# Patient Record
Sex: FEMALE | Race: WHITE | NOT HISPANIC OR LATINO | Employment: OTHER | URBAN - METROPOLITAN AREA
[De-identification: names, ages, dates, MRNs, and addresses within clinical notes are randomized per-mention and may not be internally consistent; named-entity substitution may affect disease eponyms.]

---

## 2017-05-01 ENCOUNTER — HISTORICAL (OUTPATIENT)
Dept: LAB | Facility: HOSPITAL | Age: 42
End: 2017-05-01

## 2017-05-01 LAB
ABS NEUT (OLG): 3.5 X10(3)/MCL (ref 1.5–6.9)
ALBUMIN SERPL-MCNC: 3.9 GM/DL (ref 3.4–5)
ALBUMIN/GLOB SERPL: 1.1 RATIO
ALP SERPL-CCNC: 68 UNIT/L (ref 30–113)
ALT SERPL-CCNC: 47 UNIT/L (ref 10–45)
AST SERPL-CCNC: 21 UNIT/L (ref 15–37)
BASOPHILS # BLD AUTO: 0 X10(3)/MCL (ref 0–0.1)
BASOPHILS NFR BLD AUTO: 0 % (ref 0–1)
BILIRUB SERPL-MCNC: 0.4 MG/DL (ref 0.1–0.9)
BILIRUBIN DIRECT+TOT PNL SERPL-MCNC: 0.1 MG/DL (ref 0–0.3)
BILIRUBIN DIRECT+TOT PNL SERPL-MCNC: 0.3 MG/DL
BUN SERPL-MCNC: 18 MG/DL (ref 10–20)
CALCIUM SERPL-MCNC: 9 MG/DL (ref 8–10.5)
CHLORIDE SERPL-SCNC: 103 MMOL/L (ref 100–108)
CHOLEST SERPL-MCNC: 195 MG/DL (ref 140–200)
CHOLEST/HDLC SERPL: 4 MG/DL (ref 35–59)
CO2 SERPL-SCNC: 30 MMOL/L (ref 21–35)
CREAT SERPL-MCNC: 0.72 MG/DL (ref 0.7–1.3)
EOSINOPHIL # BLD AUTO: 0.2 X10(3)/MCL (ref 0–0.6)
EOSINOPHIL NFR BLD AUTO: 3 % (ref 0–5)
ERYTHROCYTE [DISTWIDTH] IN BLOOD BY AUTOMATED COUNT: 13 % (ref 11.5–17)
GLOBULIN SER-MCNC: 3.5 GM/DL
GLUCOSE SERPL-MCNC: 101 MG/DL (ref 75–116)
HCT VFR BLD AUTO: 37.3 % (ref 36–48)
HDLC SERPL-MCNC: 48 MG/DL (ref 35–59)
HGB BLD-MCNC: 12.1 GM/DL (ref 12–16)
LDLC SERPL CALC-MCNC: 132 MG/DL (ref 100–129)
LYMPHOCYTES # BLD AUTO: 2.4 X10(3)/MCL (ref 0.5–4.1)
LYMPHOCYTES NFR BLD AUTO: 36.1 % (ref 15–40)
MCH RBC QN AUTO: 30 PG (ref 27–34)
MCHC RBC AUTO-ENTMCNC: 32 GM/DL (ref 31–36)
MCV RBC AUTO: 93 FL (ref 80–99)
MONOCYTES # BLD AUTO: 0.4 X10(3)/MCL (ref 0–1.1)
MONOCYTES NFR BLD AUTO: 6 % (ref 4–12)
NEUTROPHILS # BLD AUTO: 3.5 X10(3)/MCL (ref 1.5–6.9)
NEUTROPHILS NFR BLD AUTO: 54 % (ref 43–75)
PLATELET # BLD AUTO: 218 X10(3)/MCL (ref 140–400)
PMV BLD AUTO: 10.7 FL (ref 6.8–10)
POTASSIUM SERPL-SCNC: 3.9 MMOL/L (ref 3.6–5.2)
PROT SERPL-MCNC: 7.4 GM/DL (ref 6.4–8.2)
RBC # BLD AUTO: 4.03 X10(6)/MCL (ref 4.2–5.4)
SODIUM SERPL-SCNC: 141 MMOL/L (ref 135–145)
TRIGL SERPL-MCNC: 129 MG/DL (ref 35–150)
TSH SERPL-ACNC: 3.65 MIU/ML (ref 0.36–3.74)
VLDLC SERPL CALC-MCNC: 26 MG/DL
WBC # SPEC AUTO: 6.5 X10(3)/MCL (ref 4.5–11.5)

## 2017-05-11 ENCOUNTER — HISTORICAL (OUTPATIENT)
Dept: RADIOLOGY | Facility: HOSPITAL | Age: 42
End: 2017-05-11

## 2017-06-01 ENCOUNTER — HISTORICAL (OUTPATIENT)
Dept: RADIOLOGY | Facility: HOSPITAL | Age: 42
End: 2017-06-01

## 2018-06-05 ENCOUNTER — HISTORICAL (OUTPATIENT)
Dept: LAB | Facility: HOSPITAL | Age: 43
End: 2018-06-05

## 2019-01-09 ENCOUNTER — HISTORICAL (OUTPATIENT)
Dept: LAB | Facility: HOSPITAL | Age: 44
End: 2019-01-09

## 2021-07-27 ENCOUNTER — HISTORICAL (OUTPATIENT)
Dept: ADMINISTRATIVE | Facility: HOSPITAL | Age: 46
End: 2021-07-27

## 2021-07-30 ENCOUNTER — HISTORICAL (OUTPATIENT)
Dept: RADIOLOGY | Facility: HOSPITAL | Age: 46
End: 2021-07-30

## 2021-11-17 ENCOUNTER — HISTORICAL (OUTPATIENT)
Dept: LAB | Facility: HOSPITAL | Age: 46
End: 2021-11-17

## 2021-11-19 LAB
BUN SERPL-MCNC: 13 MG/DL (ref 7–18.7)
CALCIUM SERPL-MCNC: 9.6 MG/DL (ref 8.7–10.5)
CHLORIDE SERPL-SCNC: 100 MMOL/L (ref 98–107)
CO2 SERPL-SCNC: 35 MMOL/L (ref 22–29)
CREAT SERPL-MCNC: 0.76 MG/DL (ref 0.55–1.02)
CREAT/UREA NIT SERPL: 17
GLUCOSE SERPL-MCNC: 100 MG/DL (ref 74–100)
POTASSIUM SERPL-SCNC: 4.1 MMOL/L (ref 3.5–5.1)
SODIUM SERPL-SCNC: 142 MMOL/L (ref 136–145)

## 2021-12-06 ENCOUNTER — HISTORICAL (OUTPATIENT)
Dept: RADIOLOGY | Facility: HOSPITAL | Age: 46
End: 2021-12-06

## 2022-01-11 ENCOUNTER — HISTORICAL (OUTPATIENT)
Dept: LAB | Facility: HOSPITAL | Age: 47
End: 2022-01-11

## 2022-03-30 ENCOUNTER — HISTORICAL (OUTPATIENT)
Dept: ADMINISTRATIVE | Facility: HOSPITAL | Age: 47
End: 2022-03-30

## 2022-03-30 ENCOUNTER — HISTORICAL (OUTPATIENT)
Dept: RADIOLOGY | Facility: HOSPITAL | Age: 47
End: 2022-03-30

## 2022-03-30 LAB
ALBUMIN SERPL-MCNC: 3.6 G/DL (ref 3.5–5)
ALBUMIN/GLOB SERPL: 1.1 {RATIO} (ref 1.1–2)
ALP SERPL-CCNC: 67 U/L (ref 40–150)
ALT SERPL-CCNC: 30 U/L (ref 0–55)
AST SERPL-CCNC: 22 U/L (ref 5–34)
BILIRUB SERPL-MCNC: 0.4 MG/DL
BILIRUBIN DIRECT+TOT PNL SERPL-MCNC: 0.2 (ref 0–0.5)
BILIRUBIN DIRECT+TOT PNL SERPL-MCNC: 0.2 (ref 0–0.8)
BUN SERPL-MCNC: 15 MG/DL (ref 7–18.7)
CALCIUM SERPL-MCNC: 9.7 MG/DL (ref 8.7–10.5)
CHLORIDE SERPL-SCNC: 102 MMOL/L (ref 98–107)
CHOLEST SERPL-MCNC: 184 MG/DL
CHOLEST/HDLC SERPL: 3 {RATIO} (ref 0–5)
CO2 SERPL-SCNC: 33 MMOL/L (ref 22–29)
CREAT SERPL-MCNC: 0.66 MG/DL (ref 0.55–1.02)
GLOBULIN SER-MCNC: 3.4 G/DL (ref 2.4–3.5)
GLUCOSE SERPL-MCNC: 112 MG/DL (ref 74–100)
HDLC SERPL-MCNC: 55 MG/DL (ref 35–60)
HEMOLYSIS INTERF INDEX SERPL-ACNC: 6
ICTERIC INTERF INDEX SERPL-ACNC: 0
LDLC SERPL CALC-MCNC: 103 MG/DL (ref 50–140)
LIPEMIC INTERF INDEX SERPL-ACNC: 3
POTASSIUM SERPL-SCNC: 4.1 MMOL/L (ref 3.5–5.1)
PROT SERPL-MCNC: 7 G/DL (ref 6.4–8.3)
SODIUM SERPL-SCNC: 139 MMOL/L (ref 136–145)
TRIGL SERPL-MCNC: 131 MG/DL (ref 37–140)
TSH SERPL-ACNC: 4.81 M[IU]/L (ref 0.35–4.94)
VLDLC SERPL CALC-MCNC: 26 MG/DL

## 2022-04-05 ENCOUNTER — HISTORICAL (OUTPATIENT)
Dept: RADIOLOGY | Facility: HOSPITAL | Age: 47
End: 2022-04-05

## 2022-04-05 ENCOUNTER — HISTORICAL (OUTPATIENT)
Dept: ADMINISTRATIVE | Facility: HOSPITAL | Age: 47
End: 2022-04-05

## 2022-04-06 ENCOUNTER — HISTORICAL (OUTPATIENT)
Dept: ADMINISTRATIVE | Facility: HOSPITAL | Age: 47
End: 2022-04-06

## 2022-04-13 ENCOUNTER — HISTORICAL (OUTPATIENT)
Dept: ADMINISTRATIVE | Facility: HOSPITAL | Age: 47
End: 2022-04-13

## 2022-04-13 ENCOUNTER — HISTORICAL (OUTPATIENT)
Dept: RADIOLOGY | Facility: HOSPITAL | Age: 47
End: 2022-04-13
Payer: MEDICAID

## 2022-04-14 ENCOUNTER — HISTORICAL (OUTPATIENT)
Dept: ADMINISTRATIVE | Facility: HOSPITAL | Age: 47
End: 2022-04-14

## 2022-04-27 ENCOUNTER — HISTORICAL (OUTPATIENT)
Dept: LAB | Facility: HOSPITAL | Age: 47
End: 2022-04-27
Payer: MEDICAID

## 2022-04-27 LAB — TSH SERPL-ACNC: 3.01 M[IU]/L (ref 0.35–4.94)

## 2022-05-19 ENCOUNTER — HOSPITAL ENCOUNTER (EMERGENCY)
Facility: HOSPITAL | Age: 47
Discharge: HOME | End: 2022-05-19
Attending: STUDENT IN AN ORGANIZED HEALTH CARE EDUCATION/TRAINING PROGRAM
Payer: MEDICAID

## 2022-05-19 VITALS
BODY MASS INDEX: 50.02 KG/M2 | WEIGHT: 293 LBS | RESPIRATION RATE: 20 BRPM | DIASTOLIC BLOOD PRESSURE: 84 MMHG | OXYGEN SATURATION: 94 % | HEIGHT: 64 IN | TEMPERATURE: 99 F | SYSTOLIC BLOOD PRESSURE: 152 MMHG | HEART RATE: 96 BPM

## 2022-05-19 DIAGNOSIS — K04.7 DENTAL ABSCESS: Primary | ICD-10-CM

## 2022-05-19 PROCEDURE — 96372 THER/PROPH/DIAG INJ SC/IM: CPT | Performed by: NURSE PRACTITIONER

## 2022-05-19 PROCEDURE — 63600175 PHARM REV CODE 636 W HCPCS: Performed by: NURSE PRACTITIONER

## 2022-05-19 PROCEDURE — 99284 EMERGENCY DEPT VISIT MOD MDM: CPT

## 2022-05-19 RX ORDER — KETOROLAC TROMETHAMINE 30 MG/ML
30 INJECTION, SOLUTION INTRAMUSCULAR; INTRAVENOUS ONCE
Status: COMPLETED | OUTPATIENT
Start: 2022-05-19 | End: 2022-05-19

## 2022-05-19 RX ORDER — PREDNISONE 20 MG/1
40 TABLET ORAL DAILY
Qty: 10 TABLET | Refills: 0 | Status: SHIPPED | OUTPATIENT
Start: 2022-05-19 | End: 2022-05-24

## 2022-05-19 RX ADMIN — KETOROLAC TROMETHAMINE 30 MG: 30 INJECTION, SOLUTION INTRAMUSCULAR; INTRAVENOUS at 09:05

## 2022-05-20 NOTE — ED PROVIDER NOTES
"     Source of History:  Patient     Chief complaint:  Dental Problem (Pt was seen by DR Alvarado for dental pain . Was given clinda and norco but its not helping. )      HPI:  Linda Lorenz is a 46 y.o. female presenting with right lower dental pain.  Patient states she saw her PCP yesterday who prescribed her antibiotics as well as pain medicine but is not helping.  Patient denies any fevers chills.  Patient states this started antibiotics swelling seen to the external skin surface over the jaw has increased.  Patient has no fevers or chills.  Patient has no respiratory distress.  She has no stridor.    This is the extent to the patients complaints today here in the emergency department.    ROS: As per HPI and below:  General: No fever.  No chills.  Eyes: No visual changes.  ENT: No sore throat. No ear pain.  Right lower dental pain.  Head: No headache.    Chest: No shortness of breath. No cough.  Cardiovascular: No chest pain.  Abdomen: No abdominal pain.  No nausea or vomiting.  Genito-Urinary: No abnormal urination.  Neurologic: No focal weakness.  No numbness.  MSK: No myalgias. No arthralgias.   Integument: No rashes or lesions.  Psych: No confusion      Review of patient's allergies indicates:   Allergen Reactions    Penicillins        PMH:  As per HPI and below:  History reviewed. No pertinent past medical history.  History reviewed. No pertinent surgical history.         Physical Exam:    /73 (BP Location: Left arm, Patient Position: Sitting)   Pulse 98   Temp 98.8 °F (37.1 °C) (Oral)   Resp 20   Ht 5' 4" (1.626 m)   Wt (!) 181.4 kg (400 lb)   SpO2 96%   BMI 68.66 kg/m²   Nursing note and vital signs reviewed.  Appearance: Afebrile. Not toxic appearing. No acute distress.  Head: Atraumatic  Eyes: No conjunctival injection. No scleral icterus  ENT: Normal phonation.  Tooth number 28 is painful with palpation with tongue depressor and a small 1/2 cm abscess is seen on the gums to the " right of tooth 29.  Fluctuance noted.  Poor dentition throughout.  Chest/ Respiratory: No respiratory distress. No accessory muscle use.  Cardiovascular: Regular rate   Abdomen:  Not distended.    Musculoskeletal: Good range of motion all joints.  No deformities.  Neck supple.  No meningismus.  Skin: No rashes seen.  Good turgor.  No ecchymoses.  Neurologic: GCS 15. Ambulates with a steady gait.   Mental Status:  Alert and oriented x 3.  Appropriate, conversant    Labs that have been ordered have been independently reviewed and interpreted by myself.        Initial Impression/ Differential Dx:  Dental abscess    MDM:    46 y.o. female with right lower dental pain presents emergency room for evaluation.  Patient was seen by PCP in order given appropriate medications for dental abscess and is apparently scheduled to see a dentist but is on a waiting list.  Will give anti-inflammatory injection of Toradol at this time and sent home with steroids.  I discussed with her draining dental abscess seen on exam however the patient refused.  She states that she would rather follow up with her dentist.  I did discuss attempting salt water gargles as well as hot compress which the patient understood.                   Diagnostic Impression:    1. Dental abscess         ED Disposition Condition    Discharge Stable          ED Prescriptions     Medication Sig Dispense Start Date End Date Auth. Provider    predniSONE (DELTASONE) 20 MG tablet Take 2 tablets (40 mg total) by mouth once daily. for 5 days 10 tablet 5/19/2022 5/24/2022 DERRELL Ragland        Follow-up Information     Follow up With Specialties Details Why Contact Info    Jenaro Alvarado MD Family Medicine Call in 1 week As needed [id number] [id number] [id number]  [phone]             DERRELL Ragland  05/19/22 2077

## 2022-06-22 ENCOUNTER — LAB VISIT (OUTPATIENT)
Dept: LAB | Facility: HOSPITAL | Age: 47
End: 2022-06-22
Attending: FAMILY MEDICINE
Payer: MEDICAID

## 2022-06-22 DIAGNOSIS — R60.0 LOCALIZED EDEMA: Primary | ICD-10-CM

## 2022-06-22 LAB
ANION GAP SERPL CALC-SCNC: 5 MEQ/L
BUN SERPL-MCNC: 16 MG/DL (ref 7–18.7)
CALCIUM SERPL-MCNC: 9.2 MG/DL (ref 8.4–10.2)
CHLORIDE SERPL-SCNC: 102 MMOL/L (ref 98–107)
CO2 SERPL-SCNC: 32 MMOL/L (ref 22–29)
CREAT SERPL-MCNC: 0.58 MG/DL (ref 0.55–1.02)
CREAT/UREA NIT SERPL: 28
GLUCOSE SERPL-MCNC: 109 MG/DL (ref 74–100)
POTASSIUM SERPL-SCNC: 4.2 MMOL/L (ref 3.5–5.1)
SODIUM SERPL-SCNC: 139 MMOL/L (ref 136–145)

## 2022-06-22 PROCEDURE — 36415 COLL VENOUS BLD VENIPUNCTURE: CPT

## 2022-06-22 PROCEDURE — 80048 BASIC METABOLIC PNL TOTAL CA: CPT

## 2022-08-18 ENCOUNTER — HOSPITAL ENCOUNTER (OUTPATIENT)
Dept: RADIOLOGY | Facility: HOSPITAL | Age: 47
Discharge: HOME | End: 2022-08-18
Attending: FAMILY MEDICINE
Payer: MEDICAID

## 2022-08-18 DIAGNOSIS — M54.6 PAIN IN THORACIC SPINE: ICD-10-CM

## 2022-08-18 PROCEDURE — 72070 X-RAY EXAM THORAC SPINE 2VWS: CPT | Mod: TC

## 2022-09-26 ENCOUNTER — HOSPITAL ENCOUNTER (INPATIENT)
Facility: HOSPITAL | Age: 47
LOS: 3 days | Discharge: HOME HEALTH | DRG: 188 | End: 2022-09-29
Attending: FAMILY MEDICINE | Admitting: INTERNAL MEDICINE
Payer: MEDICAID

## 2022-09-26 DIAGNOSIS — G47.33 OSA (OBSTRUCTIVE SLEEP APNEA): ICD-10-CM

## 2022-09-26 DIAGNOSIS — E87.6 HYPOKALEMIA: ICD-10-CM

## 2022-09-26 DIAGNOSIS — R09.02 HYPOXIA: Primary | ICD-10-CM

## 2022-09-26 DIAGNOSIS — R07.9 CHEST PAIN: ICD-10-CM

## 2022-09-26 DIAGNOSIS — R07.9 CHEST PAIN, UNSPECIFIED TYPE: ICD-10-CM

## 2022-09-26 LAB
ALBUMIN SERPL-MCNC: 3.7 GM/DL (ref 3.5–5)
ALBUMIN/GLOB SERPL: 1 RATIO (ref 1.1–2)
ALP SERPL-CCNC: 75 UNIT/L (ref 40–150)
ALT SERPL-CCNC: 44 UNIT/L (ref 0–55)
AST SERPL-CCNC: 29 UNIT/L (ref 5–34)
BASOPHILS # BLD AUTO: 0.03 X10(3)/MCL (ref 0–0.2)
BASOPHILS NFR BLD AUTO: 0.4 %
BILIRUBIN DIRECT+TOT PNL SERPL-MCNC: 0.4 MG/DL
BNP BLD-MCNC: <10 PG/ML
BUN SERPL-MCNC: 25.3 MG/DL (ref 7–18.7)
CALCIUM SERPL-MCNC: 10 MG/DL (ref 8.4–10.2)
CHLORIDE SERPL-SCNC: 95 MMOL/L (ref 98–107)
CO2 SERPL-SCNC: 37 MMOL/L (ref 22–29)
CREAT SERPL-MCNC: 0.92 MG/DL (ref 0.55–1.02)
EOSINOPHIL # BLD AUTO: 0.11 X10(3)/MCL (ref 0–0.9)
EOSINOPHIL NFR BLD AUTO: 1.6 %
ERYTHROCYTE [DISTWIDTH] IN BLOOD BY AUTOMATED COUNT: 15 % (ref 11.5–17)
EST. AVERAGE GLUCOSE BLD GHB EST-MCNC: 125.5 MG/DL
GFR SERPLBLD CREATININE-BSD FMLA CKD-EPI: >60 MLS/MIN/1.73/M2
GLOBULIN SER-MCNC: 3.7 GM/DL (ref 2.4–3.5)
GLUCOSE SERPL-MCNC: 105 MG/DL (ref 74–100)
HBA1C MFR BLD: 6 %
HCT VFR BLD AUTO: 39.1 % (ref 37–47)
HGB BLD-MCNC: 11.6 GM/DL (ref 12–16)
IMM GRANULOCYTES # BLD AUTO: 0.03 X10(3)/MCL (ref 0–0.04)
IMM GRANULOCYTES NFR BLD AUTO: 0.4 %
LYMPHOCYTES # BLD AUTO: 1.64 X10(3)/MCL (ref 0.6–4.6)
LYMPHOCYTES NFR BLD AUTO: 23.7 %
MAGNESIUM SERPL-MCNC: 1.7 MG/DL (ref 1.6–2.6)
MCH RBC QN AUTO: 28.4 PG (ref 27–31)
MCHC RBC AUTO-ENTMCNC: 29.7 MG/DL (ref 33–36)
MCV RBC AUTO: 95.8 FL (ref 80–94)
MONOCYTES # BLD AUTO: 0.42 X10(3)/MCL (ref 0.1–1.3)
MONOCYTES NFR BLD AUTO: 6.1 %
NEUTROPHILS # BLD AUTO: 4.7 X10(3)/MCL (ref 2.1–9.2)
NEUTROPHILS NFR BLD AUTO: 67.8 %
NRBC BLD AUTO-RTO: 0 %
PLATELET # BLD AUTO: 247 X10(3)/MCL (ref 130–400)
PMV BLD AUTO: 10.5 FL (ref 7.4–10.4)
POTASSIUM SERPL-SCNC: 3.8 MMOL/L (ref 3.5–5.1)
PROT SERPL-MCNC: 7.4 GM/DL (ref 6.4–8.3)
RBC # BLD AUTO: 4.08 X10(6)/MCL (ref 4.2–5.4)
SARS-COV-2 RDRP RESP QL NAA+PROBE: NEGATIVE
SODIUM SERPL-SCNC: 142 MMOL/L (ref 136–145)
T4 FREE SERPL-MCNC: 1.19 NG/DL (ref 0.7–1.48)
TROPONIN I SERPL-MCNC: <0.01 NG/ML (ref 0–0.04)
TSH SERPL-ACNC: 3.79 UIU/ML (ref 0.35–4.94)
WBC # SPEC AUTO: 6.9 X10(3)/MCL (ref 4.5–11.5)

## 2022-09-26 PROCEDURE — 36415 COLL VENOUS BLD VENIPUNCTURE: CPT | Performed by: FAMILY MEDICINE

## 2022-09-26 PROCEDURE — 83036 HEMOGLOBIN GLYCOSYLATED A1C: CPT | Performed by: NURSE PRACTITIONER

## 2022-09-26 PROCEDURE — 99291 CRITICAL CARE FIRST HOUR: CPT | Mod: 25

## 2022-09-26 PROCEDURE — 84439 ASSAY OF FREE THYROXINE: CPT | Performed by: NURSE PRACTITIONER

## 2022-09-26 PROCEDURE — 36415 COLL VENOUS BLD VENIPUNCTURE: CPT | Performed by: NURSE PRACTITIONER

## 2022-09-26 PROCEDURE — 85025 COMPLETE CBC W/AUTO DIFF WBC: CPT | Performed by: FAMILY MEDICINE

## 2022-09-26 PROCEDURE — 11000001 HC ACUTE MED/SURG PRIVATE ROOM

## 2022-09-26 PROCEDURE — 84484 ASSAY OF TROPONIN QUANT: CPT

## 2022-09-26 PROCEDURE — 80053 COMPREHEN METABOLIC PANEL: CPT | Performed by: FAMILY MEDICINE

## 2022-09-26 PROCEDURE — 94761 N-INVAS EAR/PLS OXIMETRY MLT: CPT

## 2022-09-26 PROCEDURE — 99900035 HC TECH TIME PER 15 MIN (STAT)

## 2022-09-26 PROCEDURE — 87635 SARS-COV-2 COVID-19 AMP PRB: CPT | Performed by: FAMILY MEDICINE

## 2022-09-26 PROCEDURE — 25500020 PHARM REV CODE 255

## 2022-09-26 PROCEDURE — 21400001 HC TELEMETRY ROOM

## 2022-09-26 PROCEDURE — 83880 ASSAY OF NATRIURETIC PEPTIDE: CPT | Performed by: FAMILY MEDICINE

## 2022-09-26 PROCEDURE — 27000221 HC OXYGEN, UP TO 24 HOURS

## 2022-09-26 PROCEDURE — 27000190 HC CPAP FULL FACE MASK W/VALVE

## 2022-09-26 PROCEDURE — 63600175 PHARM REV CODE 636 W HCPCS: Performed by: NURSE PRACTITIONER

## 2022-09-26 PROCEDURE — 25000003 PHARM REV CODE 250: Performed by: NURSE PRACTITIONER

## 2022-09-26 PROCEDURE — 84443 ASSAY THYROID STIM HORMONE: CPT | Performed by: NURSE PRACTITIONER

## 2022-09-26 PROCEDURE — 83735 ASSAY OF MAGNESIUM: CPT | Performed by: FAMILY MEDICINE

## 2022-09-26 PROCEDURE — 84484 ASSAY OF TROPONIN QUANT: CPT | Performed by: FAMILY MEDICINE

## 2022-09-26 PROCEDURE — 83735 ASSAY OF MAGNESIUM: CPT | Performed by: NURSE PRACTITIONER

## 2022-09-26 PROCEDURE — 93005 ELECTROCARDIOGRAM TRACING: CPT

## 2022-09-26 RX ORDER — SODIUM CHLORIDE 0.9 % (FLUSH) 0.9 %
10 SYRINGE (ML) INJECTION
Status: DISCONTINUED | OUTPATIENT
Start: 2022-09-26 | End: 2022-09-29 | Stop reason: HOSPADM

## 2022-09-26 RX ORDER — FUROSEMIDE 10 MG/ML
40 INJECTION INTRAMUSCULAR; INTRAVENOUS DAILY
Status: DISCONTINUED | OUTPATIENT
Start: 2022-09-27 | End: 2022-09-27

## 2022-09-26 RX ORDER — TALC
6 POWDER (GRAM) TOPICAL NIGHTLY PRN
Status: DISCONTINUED | OUTPATIENT
Start: 2022-09-26 | End: 2022-09-29 | Stop reason: HOSPADM

## 2022-09-26 RX ORDER — PANTOPRAZOLE SODIUM 40 MG/10ML
40 INJECTION, POWDER, LYOPHILIZED, FOR SOLUTION INTRAVENOUS EVERY 24 HOURS
Status: DISCONTINUED | OUTPATIENT
Start: 2022-09-27 | End: 2022-09-29 | Stop reason: HOSPADM

## 2022-09-26 RX ORDER — QUETIAPINE FUMARATE 100 MG/1
300 TABLET, FILM COATED ORAL NIGHTLY
Status: DISCONTINUED | OUTPATIENT
Start: 2022-09-26 | End: 2022-09-29 | Stop reason: HOSPADM

## 2022-09-26 RX ORDER — SODIUM CHLORIDE, SODIUM LACTATE, POTASSIUM CHLORIDE, CALCIUM CHLORIDE 600; 310; 30; 20 MG/100ML; MG/100ML; MG/100ML; MG/100ML
INJECTION, SOLUTION INTRAVENOUS CONTINUOUS
Status: DISCONTINUED | OUTPATIENT
Start: 2022-09-26 | End: 2022-09-28

## 2022-09-26 RX ORDER — ENOXAPARIN SODIUM 100 MG/ML
180 INJECTION SUBCUTANEOUS EVERY 24 HOURS
Status: DISCONTINUED | OUTPATIENT
Start: 2022-09-26 | End: 2022-09-26

## 2022-09-26 RX ORDER — ENOXAPARIN SODIUM 100 MG/ML
40 INJECTION SUBCUTANEOUS 2 TIMES DAILY
Status: DISCONTINUED | OUTPATIENT
Start: 2022-09-27 | End: 2022-09-29 | Stop reason: HOSPADM

## 2022-09-26 RX ORDER — IPRATROPIUM BROMIDE AND ALBUTEROL SULFATE 2.5; .5 MG/3ML; MG/3ML
3 SOLUTION RESPIRATORY (INHALATION) EVERY 4 HOURS PRN
Status: DISCONTINUED | OUTPATIENT
Start: 2022-09-26 | End: 2022-09-29 | Stop reason: HOSPADM

## 2022-09-26 RX ORDER — BUPROPION HYDROCHLORIDE 150 MG/1
150 TABLET ORAL DAILY
Status: DISCONTINUED | OUTPATIENT
Start: 2022-09-27 | End: 2022-09-29 | Stop reason: HOSPADM

## 2022-09-26 RX ORDER — FOLIC ACID 1 MG/1
1 TABLET ORAL DAILY
Status: DISCONTINUED | OUTPATIENT
Start: 2022-09-27 | End: 2022-09-29 | Stop reason: HOSPADM

## 2022-09-26 RX ORDER — ENOXAPARIN SODIUM 100 MG/ML
40 INJECTION SUBCUTANEOUS EVERY 24 HOURS
Status: DISCONTINUED | OUTPATIENT
Start: 2022-09-26 | End: 2022-09-26

## 2022-09-26 RX ORDER — FUROSEMIDE 10 MG/ML
40 INJECTION INTRAMUSCULAR; INTRAVENOUS ONCE
Status: COMPLETED | OUTPATIENT
Start: 2022-09-26 | End: 2022-09-26

## 2022-09-26 RX ORDER — LOSARTAN POTASSIUM 25 MG/1
100 TABLET ORAL DAILY
Status: DISCONTINUED | OUTPATIENT
Start: 2022-09-27 | End: 2022-09-29 | Stop reason: HOSPADM

## 2022-09-26 RX ADMIN — SODIUM CHLORIDE, POTASSIUM CHLORIDE, SODIUM LACTATE AND CALCIUM CHLORIDE: 600; 310; 30; 20 INJECTION, SOLUTION INTRAVENOUS at 06:09

## 2022-09-26 RX ADMIN — QUETIAPINE FUMARATE 300 MG: 100 TABLET ORAL at 10:09

## 2022-09-26 RX ADMIN — FUROSEMIDE 40 MG: 10 INJECTION INTRAMUSCULAR; INTRAVENOUS at 06:09

## 2022-09-26 RX ADMIN — IOPAMIDOL 100 ML: 755 INJECTION, SOLUTION INTRAVENOUS at 07:09

## 2022-09-26 NOTE — H&P
[facility] Family Medicine History and Physical     Resident Team: [facility] Family Medicine List  Attending Physician: Dr. Samayoa  Resident: Alesia Hernández MD     Date of Admit: 22  Chief Complaint:    Weakness,SOB and LE edema      Subjective:     History of Present Illness:  Linda Lorenz is a 46 y.o. female with Hx of shortness of breath hypertension, CVA (2021), EMMA, bipolar, anxiety who presented to [facility] ED on 2022 with complaints of worsening shortness of breath x1 week and lower extremity edema x2 days.  In addition she reports some presyncopal episode, weakness and lightheadedness.  She denies any syncope, recent URI, sick contacts, recent travel, nausea, vomiting, fever, palpitations or syncopal episodes.  She does endorse some generalized weakness, pleuritic chest pain,  spinning of the room (is chronic and dizziness.  She states that she has a sleep study that has been completed but she supposed to actually get the CPAP machine in about a month.  In the ED she was satting 93% on room air and desatted in the 60s in her sleep.  She states that she used to be on the CPAP but due to insurance reason she had to return the machine.  On physical exam patient demonstrates bilateral lower extremity edema, no JVD and otherwise unremarkable physical exam.  Internal Medicine was consulted on the case for hypoxia, shortness of breath secondary to bilateral pleural effusions in rule out possible PE    In the ED: BNP <10, troponin negative, sodium 142, potassium 3.8, BUN 25.3/creatinine 0.92 ,glucose 105, WBC 6.9, H/H 11.6/39.1, platelet 247    Chest x-ray: showed trace bilateral pleural effusions    Review of Systems:  As stated above      Past Medical History:   HTN, CVA (2021), anxiety, bipolar    Past Surgical History:  Past Surgical History:   Procedure Laterality Date     SECTION      CHOLECYSTECTOMY      TUBAL LIGATION         Allergies:  Review of patient's allergies indicates:    Allergen Reactions    Penicillins    (anaphylaxis, respiratory distress)    Home Medications:  Prior to Admission medications    Not on File       Family History:  None    Social History:   Denies tobacco use, alcohol use or illicit drug use       Objective:   Vitals  Vitals  BP: (!) 128/107  Temp: 98.3 °F (36.8 °C)  Pulse: 91  Resp: 17  SpO2: 97 %  Weight: (!) 181.4 kg (400 lb)    Physical Examination:  Constitutional: NAD  Head: Normocephalic, atraumatic  Eyes: PEERLA, EOMI, Anicteric conjunctiva  ENT: No lymphadenopathy, No thyromegaly  Lungs: CTAB, No crackles, No wheezes  Cardiovascular: Normal heart sounds, No MRG, No JVD  Abdomen: Soft, Nontender, No palpable hepatosplenomegaly, No abdominal masses, No ascites  Extremities: No cyanosis, No clubbing, +2 edema cielo  Neuro: Alert and oriented to person, place, and time, Reflexes and strength are normal  Skin: Warm, dry, no rashes      Laboratory:  Lab Results   Component Value Date    WBC 6.9 09/26/2022    HGB 11.6 (L) 09/26/2022    HCT 39.1 09/26/2022    MCV 95.8 (H) 09/26/2022     09/26/2022         BMP  Lab Results   Component Value Date     09/26/2022    K 3.8 09/26/2022    CO2 37 (H) 09/26/2022    BUN 25.3 (H) 09/26/2022    CREATININE 0.92 09/26/2022    CALCIUM 10.0 09/26/2022    EGFRNONAA >60 06/22/2022       ABG  No results for input(s): PH, PO2, PCO2, HCO3, BE in the last 168 hours.        Radiology:  X-Ray Chest PA And Lateral    Result Date: 9/26/2022  Trace bilateral pleural effusions. Electronically signed by: Alejandra Gauthier Date:    09/26/2022 Time:    13:04       Assessment & Plan:     Hypoxia  Oxygen desaturations  Rule out PE (wells criteria equal 3 moderate risk)  -Upon admit having oxygen desaturations, pleuritic chest pain  -order CTA PE rule out PE  -chest x-ray:  Showed bilateral pleural effusions  -order Lasix 40 mg x 1  -Lasix 40 mg in a.m. daily (BUN 25.3/creatinine 0.9 )  -CPAP at night (has history of CPAP  use)    Dizziness  Presyncopal episode  -physical exam with bilateral lower extremity edema +2 to the knees  -Echo with bubble study in a.m. to evaluate a cardiac function  -EKG normal sinus rhythm  -neuro checks q.2   -order TSH, free T4, A1c    HTN  -restarted home losartan 100 mg daily  -CMP, CBC, Mg, Phos in am . Keep K>4, Phos>3, Mg>2     Hx bipolar  History anxiety  -restarted home Quetiapine 300 mg q.h.s., bupropion 150 mg daily    History anemia  -restart home folic acid 1 mg daily    Code Status:  Full code  Consults:  None  Lines: PIV  Drains: none  Analgesia/Sedation:  None  Antibiotics:   Diet: NPO  Fluids:  LR at 100  Drips:  None  Oxygenation: Oxymask 2 L/min  DVT Prophylaxis: SCDs  GI Prophylaxis: Protonix 40 mg daily IV      Disposition:  Admit to telemetry floor.  Will maty.  Continue to monitor electrolytes      Alesia Hernández MD  [employer] [facility], PGY2

## 2022-09-26 NOTE — ED PROVIDER NOTES
Name: Linda Lorenz   Age: 46 y.o.  Sex: female    Chief complaint:   Chief Complaint   Patient presents with    Shortness of Breath     Right rib pain due to broken ribs after coughing for one year. Epigastric pain. Gen weakness, SOB. EKG notified.      Patient arrived with: Private  History obtained from: Patient    Subjective:   46-year-old female with a past medical history of CVA (residual vertigo in double vision), hypertension that presents to emergency department for shortness of breath.  Patient says she has chronic shortness of breath which feels like a got worse over the past week.  Has also noticed bilateral lower extremity edema that is been going on for couple days.  Denies chest pain.  Complains of a cough that is chronic in nature and unchanged.  Has felt weak and lightheaded, no syncopal episodes.  Patient said she was diagnosed with 6 rib fractures on 09/15 after she had some bad coughing fits.  Denies fever, chills, sweats, abdominal pain, nausea, vomiting, diarrhea, dysuria, hematuria.      Patient says she used to previously see use a CPAP machine and then had discontinued.  Since stopping the CPAP machine proximally 1-2 years ago she feels like her symptoms have gotten progressively worse.    Past Medical History:   Diagnosis Date    CVA (cerebral vascular accident)     Hypertension      Past Surgical History:   Procedure Laterality Date     SECTION      CHOLECYSTECTOMY      TUBAL LIGATION       Social History     Socioeconomic History    Marital status:      Review of patient's allergies indicates:   Allergen Reactions    Penicillins         Review of Systems   Constitutional:  Negative for diaphoresis and fever.   HENT:  Negative for congestion and sore throat.    Eyes:  Negative for pain and discharge.   Respiratory:  Positive for cough and shortness of breath.    Cardiovascular:  Negative for chest pain and palpitations.   Gastrointestinal:  Negative for diarrhea  and vomiting.   Genitourinary:  Negative for dysuria and hematuria.   Musculoskeletal:  Negative for back pain and myalgias.   Skin:  Negative for itching and rash.   Neurological:  Positive for weakness. Negative for headaches.        Objective:     Vitals:    09/26/22 1151   BP: 130/75   Pulse: 93   Resp: 12   Temp:         Physical Exam  Constitutional:       Appearance: She is obese. She is not toxic-appearing.   HENT:      Head: Normocephalic and atraumatic.   Cardiovascular:      Rate and Rhythm: Regular rhythm.      Pulses: Normal pulses.   Pulmonary:      Effort: Pulmonary effort is normal.      Breath sounds: Normal breath sounds.   Abdominal:      General: Abdomen is flat. Bowel sounds are normal.      Palpations: Abdomen is soft.      Tenderness: There is no right CVA tenderness or left CVA tenderness.   Musculoskeletal:         General: No deformity. Normal range of motion.      Cervical back: Normal range of motion and neck supple.      Right lower leg: Edema (1+) present.      Left lower leg: Edema (1+) present.   Skin:     General: Skin is warm and dry.   Neurological:      General: No focal deficit present.      Mental Status: She is alert and oriented to person, place, and time.   Psychiatric:         Mood and Affect: Mood normal.         Behavior: Behavior normal.        Records:  Nursing records and triage records reviewed  Prior records reviewed    Medical decision making:   Presents to the emergency department for worsening shortness of breath.  Patient is stable and nontoxic appearing.  Afebrile, non tachycardic, normotensive, was saturating in the low 90s on room air.  Patient then fell asleep and her oxygen saturation dropped to the 60s, had to be put on nasal cannula.  Patient has clear lung sounds, has 1+ lower extremity edema.  Will get cardiac workup for further evaluation.  Have a high suspicion that her shortness of breath is worse because of her obesity and she most likely has  obstructive sleep apnea.  Would benefit from the CPAP at home.    ED Course as of 09/26/22 1351   Mon Sep 26, 2022   1317 EKG is nonischemic.  Troponin negative.  BNP within normal limits.  No leukocytosis, anemia, thrombocytopenia.  No severe electrolyte abnormality.  No DANIEL hyperglycemia.  Liver enzymes are within normal limits.    Chest x-ray within normal limits. [RK]   1338 COVID negative.      Patient will be admitted to the hospital for further management secondary to hypoxia.  Discussed with internal medicine team. [RK]      ED Course User Index  [RK] Reuben Sewell MD          EKG:  ECG Results              EKG 12-lead (Preliminary result)  Result time 09/26/22 11:47:48      ED Interpretation by Reuben Sewell MD (09/26/22 11:47:48, [facility] Dept, Emergency Medicine)    Normal sinus rhythm at a rate of 88, no signs of ST elevation or depression, normal axis, normal intervals with a QTC of 440                      In process by Interface, Lab In [facility] (09/26/22 10:52:55)                   Narrative:    Test Reason : R07.9,    Vent. Rate : 088 BPM     Atrial Rate : 088 BPM     P-R Int : 166 ms          QRS Dur : 104 ms      QT Int : 364 ms       P-R-T Axes : 029 043 013 degrees     QTc Int : 440 ms    Normal sinus rhythm  Normal ECG  No previous ECGs available    Referred By: [email or web address]   SELF           Confirmed By:                                      Critical Care    Date/Time: 9/26/2022 1:50 PM  Performed by: Reuben Sewell MD  Authorized by: Reuben Sewell MD   Total critical care time (exclusive of procedural time) : 35 minutes  Comments: Upon my evaluation, this patient had a high probability of imminent or life-threatening deterioration due to hypoxia, which required my direct attention, intervention, and personal management.    I have personally provided 35 minutes of critical care time exclusive of time spent on separately billed procedures. Time  includes review of chart, history and physical exam of the patient, review of laboratory data, review of radiology results, discussion with consultants, and monitoring for potential decompensation. Interventions were performed as documented above.              Diagnosis:  Final diagnoses:  [R07.9] Chest pain  [R09.02] Hypoxia (Primary)          Reuben Sewell M.D.  Emergency Medicine Physician     (Please note that this chart was completed via voice to text dictation. There may be typographical errors or substitutions that are unintentional, or uncorrected. Every attempt was made to proofread the chart prior to completion. If there are any questions, please contact the physician for final clarification).         Reuben Sewell MD  09/26/22 9523

## 2022-09-27 LAB
ALBUMIN SERPL-MCNC: 3.3 GM/DL (ref 3.5–5)
ALBUMIN/GLOB SERPL: 1.1 RATIO (ref 1.1–2)
ALP SERPL-CCNC: 58 UNIT/L (ref 40–150)
ALT SERPL-CCNC: 41 UNIT/L (ref 0–55)
AST SERPL-CCNC: 31 UNIT/L (ref 5–34)
AV INDEX (PROSTH): 0.48
AV MEAN GRADIENT: 5 MMHG
AV PEAK GRADIENT: 11 MMHG
AV VALVE AREA: 1.81 CM2
AV VELOCITY RATIO: 0.45
BASOPHILS # BLD AUTO: 0.02 X10(3)/MCL (ref 0–0.2)
BASOPHILS NFR BLD AUTO: 0.3 %
BILIRUBIN DIRECT+TOT PNL SERPL-MCNC: 0.5 MG/DL
BSA FOR ECHO PROCEDURE: 2.86 M2
BUN SERPL-MCNC: 21.9 MG/DL (ref 7–18.7)
CALCIUM SERPL-MCNC: 9.3 MG/DL (ref 8.4–10.2)
CHLORIDE SERPL-SCNC: 96 MMOL/L (ref 98–107)
CO2 SERPL-SCNC: 36 MMOL/L (ref 22–29)
CREAT SERPL-MCNC: 0.77 MG/DL (ref 0.55–1.02)
CV ECHO LV RWT: 0.48 CM
DOP CALC AO PEAK VEL: 1.65 M/S
DOP CALC AO VTI: 27.5 CM
DOP CALC LVOT AREA: 3.8 CM2
DOP CALC LVOT DIAMETER: 2.19 CM
DOP CALC LVOT PEAK VEL: 0.74 M/S
DOP CALC LVOT STROKE VOLUME: 49.7 CM3
DOP CALC MV VTI: 20.8 CM
DOP CALCLVOT PEAK VEL VTI: 13.2 CM
E WAVE DECELERATION TIME: 158 MSEC
E/A RATIO: 0.79
E/E' RATIO: 7.33 M/S
ECHO LV POSTERIOR WALL: 1.18 CM (ref 0.6–1.1)
EJECTION FRACTION: 65 %
EOSINOPHIL # BLD AUTO: 0.09 X10(3)/MCL (ref 0–0.9)
EOSINOPHIL NFR BLD AUTO: 1.4 %
ERYTHROCYTE [DISTWIDTH] IN BLOOD BY AUTOMATED COUNT: 15.2 % (ref 11.5–17)
FRACTIONAL SHORTENING: 38 % (ref 28–44)
GFR SERPLBLD CREATININE-BSD FMLA CKD-EPI: >60 MLS/MIN/1.73/M2
GLOBULIN SER-MCNC: 3.1 GM/DL (ref 2.4–3.5)
GLUCOSE SERPL-MCNC: 116 MG/DL (ref 74–100)
HCT VFR BLD AUTO: 34.2 % (ref 37–47)
HGB BLD-MCNC: 10 GM/DL (ref 12–16)
IMM GRANULOCYTES # BLD AUTO: 0.01 X10(3)/MCL (ref 0–0.04)
IMM GRANULOCYTES NFR BLD AUTO: 0.2 %
INTERVENTRICULAR SEPTUM: 1.5 CM (ref 0.6–1.1)
LEFT ATRIUM SIZE: 4.14 CM
LEFT INTERNAL DIMENSION IN SYSTOLE: 3.08 CM (ref 2.1–4)
LEFT VENTRICLE DIASTOLIC VOLUME INDEX: 43.61 ML/M2
LEFT VENTRICLE DIASTOLIC VOLUME: 114.7 ML
LEFT VENTRICLE MASS INDEX: 102 G/M2
LEFT VENTRICLE SYSTOLIC VOLUME INDEX: 14.2 ML/M2
LEFT VENTRICLE SYSTOLIC VOLUME: 37.31 ML
LEFT VENTRICULAR INTERNAL DIMENSION IN DIASTOLE: 4.93 CM (ref 3.5–6)
LEFT VENTRICULAR MASS: 267.58 G
LV LATERAL E/E' RATIO: 7 M/S
LV SEPTAL E/E' RATIO: 7.7 M/S
LVOT MG: 0.88 MMHG
LVOT MV: 0.41 CM/S
LYMPHOCYTES # BLD AUTO: 1.92 X10(3)/MCL (ref 0.6–4.6)
LYMPHOCYTES NFR BLD AUTO: 30.6 %
MAGNESIUM SERPL-MCNC: 1.4 MG/DL (ref 1.6–2.6)
MCH RBC QN AUTO: 28.1 PG (ref 27–31)
MCHC RBC AUTO-ENTMCNC: 29.2 MG/DL (ref 33–36)
MCV RBC AUTO: 96.1 FL (ref 80–94)
MONOCYTES # BLD AUTO: 0.44 X10(3)/MCL (ref 0.1–1.3)
MONOCYTES NFR BLD AUTO: 7 %
MV MEAN GRADIENT: 4 MMHG
MV PEAK A VEL: 0.98 M/S
MV PEAK E VEL: 0.77 M/S
MV PEAK GRADIENT: 5 MMHG
MV STENOSIS PRESSURE HALF TIME: 45.82 MS
MV VALVE AREA BY CONTINUITY EQUATION: 2.39 CM2
MV VALVE AREA P 1/2 METHOD: 4.8 CM2
NEUTROPHILS # BLD AUTO: 3.8 X10(3)/MCL (ref 2.1–9.2)
NEUTROPHILS NFR BLD AUTO: 60.5 %
NRBC BLD AUTO-RTO: 0 %
PHOSPHATE SERPL-MCNC: 3.8 MG/DL (ref 2.3–4.7)
PLATELET # BLD AUTO: 236 X10(3)/MCL (ref 130–400)
PMV BLD AUTO: 11.2 FL (ref 7.4–10.4)
POTASSIUM SERPL-SCNC: 3.5 MMOL/L (ref 3.5–5.1)
PROT SERPL-MCNC: 6.4 GM/DL (ref 6.4–8.3)
RBC # BLD AUTO: 3.56 X10(6)/MCL (ref 4.2–5.4)
RIGHT VENTRICULAR END-DIASTOLIC DIMENSION: 2.82 CM
SODIUM SERPL-SCNC: 142 MMOL/L (ref 136–145)
TDI LATERAL: 0.11 M/S
TDI SEPTAL: 0.1 M/S
TDI: 0.11 M/S
WBC # SPEC AUTO: 6.3 X10(3)/MCL (ref 4.5–11.5)

## 2022-09-27 PROCEDURE — 36415 COLL VENOUS BLD VENIPUNCTURE: CPT | Performed by: NURSE PRACTITIONER

## 2022-09-27 PROCEDURE — C1751 CATH, INF, PER/CENT/MIDLINE: HCPCS

## 2022-09-27 PROCEDURE — 36410 VNPNXR 3YR/> PHY/QHP DX/THER: CPT

## 2022-09-27 PROCEDURE — 25000003 PHARM REV CODE 250: Performed by: INTERNAL MEDICINE

## 2022-09-27 PROCEDURE — 25500020 PHARM REV CODE 255: Performed by: INTERNAL MEDICINE

## 2022-09-27 PROCEDURE — 25000003 PHARM REV CODE 250: Performed by: STUDENT IN AN ORGANIZED HEALTH CARE EDUCATION/TRAINING PROGRAM

## 2022-09-27 PROCEDURE — 63600175 PHARM REV CODE 636 W HCPCS: Performed by: STUDENT IN AN ORGANIZED HEALTH CARE EDUCATION/TRAINING PROGRAM

## 2022-09-27 PROCEDURE — 63600175 PHARM REV CODE 636 W HCPCS: Performed by: INTERNAL MEDICINE

## 2022-09-27 PROCEDURE — 27000221 HC OXYGEN, UP TO 24 HOURS

## 2022-09-27 PROCEDURE — A4216 STERILE WATER/SALINE, 10 ML: HCPCS | Performed by: INTERNAL MEDICINE

## 2022-09-27 PROCEDURE — 25000003 PHARM REV CODE 250

## 2022-09-27 PROCEDURE — 80053 COMPREHEN METABOLIC PANEL: CPT | Performed by: NURSE PRACTITIONER

## 2022-09-27 PROCEDURE — 21400001 HC TELEMETRY ROOM

## 2022-09-27 PROCEDURE — 63600175 PHARM REV CODE 636 W HCPCS: Performed by: NURSE PRACTITIONER

## 2022-09-27 PROCEDURE — 84100 ASSAY OF PHOSPHORUS: CPT | Performed by: NURSE PRACTITIONER

## 2022-09-27 PROCEDURE — C9113 INJ PANTOPRAZOLE SODIUM, VIA: HCPCS | Performed by: NURSE PRACTITIONER

## 2022-09-27 PROCEDURE — 94761 N-INVAS EAR/PLS OXIMETRY MLT: CPT

## 2022-09-27 PROCEDURE — 99900035 HC TECH TIME PER 15 MIN (STAT)

## 2022-09-27 PROCEDURE — 25000003 PHARM REV CODE 250: Performed by: NURSE PRACTITIONER

## 2022-09-27 PROCEDURE — 85025 COMPLETE CBC W/AUTO DIFF WBC: CPT | Performed by: NURSE PRACTITIONER

## 2022-09-27 RX ORDER — SODIUM CHLORIDE 0.9 % (FLUSH) 0.9 %
10 SYRINGE (ML) INJECTION EVERY 6 HOURS
Status: DISCONTINUED | OUTPATIENT
Start: 2022-09-27 | End: 2022-09-29 | Stop reason: HOSPADM

## 2022-09-27 RX ORDER — HYDROCODONE BITARTRATE AND ACETAMINOPHEN 7.5; 325 MG/1; MG/1
1 TABLET ORAL EVERY 6 HOURS PRN
Status: DISCONTINUED | OUTPATIENT
Start: 2022-09-27 | End: 2022-09-29 | Stop reason: HOSPADM

## 2022-09-27 RX ORDER — SODIUM CHLORIDE 0.9 % (FLUSH) 0.9 %
10 SYRINGE (ML) INJECTION
Status: DISCONTINUED | OUTPATIENT
Start: 2022-09-27 | End: 2022-09-29 | Stop reason: HOSPADM

## 2022-09-27 RX ORDER — LIDOCAINE 50 MG/G
1 PATCH TOPICAL
Status: DISCONTINUED | OUTPATIENT
Start: 2022-09-27 | End: 2022-09-29 | Stop reason: HOSPADM

## 2022-09-27 RX ORDER — MORPHINE SULFATE 2 MG/ML
2 INJECTION, SOLUTION INTRAMUSCULAR; INTRAVENOUS ONCE
Status: COMPLETED | OUTPATIENT
Start: 2022-09-27 | End: 2022-09-27

## 2022-09-27 RX ORDER — MAGNESIUM SULFATE 1 G/100ML
1 INJECTION INTRAVENOUS ONCE
Status: COMPLETED | OUTPATIENT
Start: 2022-09-28 | End: 2022-09-28

## 2022-09-27 RX ORDER — BENZONATATE 100 MG/1
100 CAPSULE ORAL 3 TIMES DAILY PRN
Status: DISCONTINUED | OUTPATIENT
Start: 2022-09-27 | End: 2022-09-29 | Stop reason: HOSPADM

## 2022-09-27 RX ORDER — FUROSEMIDE 10 MG/ML
40 INJECTION INTRAMUSCULAR; INTRAVENOUS 2 TIMES DAILY
Status: DISCONTINUED | OUTPATIENT
Start: 2022-09-27 | End: 2022-09-29 | Stop reason: HOSPADM

## 2022-09-27 RX ORDER — BACLOFEN 10 MG/1
20 TABLET ORAL ONCE
Status: COMPLETED | OUTPATIENT
Start: 2022-09-27 | End: 2022-09-27

## 2022-09-27 RX ORDER — BUSPIRONE HYDROCHLORIDE 10 MG/1
10 TABLET ORAL 2 TIMES DAILY
Status: DISCONTINUED | OUTPATIENT
Start: 2022-09-27 | End: 2022-09-29 | Stop reason: HOSPADM

## 2022-09-27 RX ADMIN — ENOXAPARIN SODIUM 40 MG: 40 INJECTION SUBCUTANEOUS at 09:09

## 2022-09-27 RX ADMIN — BENZONATATE 100 MG: 100 CAPSULE ORAL at 03:09

## 2022-09-27 RX ADMIN — ENOXAPARIN SODIUM 40 MG: 40 INJECTION SUBCUTANEOUS at 08:09

## 2022-09-27 RX ADMIN — Medication 10 ML: at 06:09

## 2022-09-27 RX ADMIN — BACLOFEN 20 MG: 10 TABLET ORAL at 08:09

## 2022-09-27 RX ADMIN — BENZONATATE 100 MG: 100 CAPSULE ORAL at 06:09

## 2022-09-27 RX ADMIN — HYDROCODONE BITARTRATE AND ACETAMINOPHEN 1 TABLET: 7.5; 325 TABLET ORAL at 03:09

## 2022-09-27 RX ADMIN — HUMAN ALBUMIN MICROSPHERES AND PERFLUTREN 0.44 MG: 10; .22 INJECTION, SOLUTION INTRAVENOUS at 09:09

## 2022-09-27 RX ADMIN — HYDROCODONE BITARTRATE AND ACETAMINOPHEN 1 TABLET: 7.5; 325 TABLET ORAL at 08:09

## 2022-09-27 RX ADMIN — PANTOPRAZOLE SODIUM 40 MG: 40 INJECTION, POWDER, FOR SOLUTION INTRAVENOUS at 09:09

## 2022-09-27 RX ADMIN — QUETIAPINE FUMARATE 300 MG: 100 TABLET ORAL at 08:09

## 2022-09-27 RX ADMIN — LIDOCAINE 1 PATCH: 50 PATCH TOPICAL at 01:09

## 2022-09-27 RX ADMIN — FUROSEMIDE 40 MG: 10 INJECTION INTRAMUSCULAR; INTRAVENOUS at 10:09

## 2022-09-27 RX ADMIN — FOLIC ACID 1 MG: 1 TABLET ORAL at 09:09

## 2022-09-27 RX ADMIN — BUSPIRONE HYDROCHLORIDE 10 MG: 10 TABLET ORAL at 09:09

## 2022-09-27 RX ADMIN — MORPHINE SULFATE 2 MG: 2 INJECTION, SOLUTION INTRAMUSCULAR; INTRAVENOUS at 01:09

## 2022-09-27 RX ADMIN — ENOXAPARIN SODIUM 40 MG: 40 INJECTION SUBCUTANEOUS at 04:09

## 2022-09-27 RX ADMIN — LOSARTAN POTASSIUM 100 MG: 25 TABLET, FILM COATED ORAL at 09:09

## 2022-09-27 RX ADMIN — HYDROCODONE BITARTRATE AND ACETAMINOPHEN 1 TABLET: 7.5; 325 TABLET ORAL at 06:09

## 2022-09-27 RX ADMIN — FUROSEMIDE 40 MG: 10 INJECTION, SOLUTION INTRAMUSCULAR; INTRAVENOUS at 09:09

## 2022-09-27 RX ADMIN — BUPROPION HYDROCHLORIDE 150 MG: 150 TABLET, FILM COATED, EXTENDED RELEASE ORAL at 09:09

## 2022-09-27 RX ADMIN — BUSPIRONE HYDROCHLORIDE 10 MG: 10 TABLET ORAL at 08:09

## 2022-09-27 RX ADMIN — SODIUM CHLORIDE, POTASSIUM CHLORIDE, SODIUM LACTATE AND CALCIUM CHLORIDE: 600; 310; 30; 20 INJECTION, SOLUTION INTRAVENOUS at 06:09

## 2022-09-27 NOTE — PLAN OF CARE
09/27/22 1125   Discharge Assessment   Assessment Type Discharge Planning Assessment   Confirmed/corrected address, phone number and insurance Yes   Confirmed Demographics Correct on Facesheet   Source of Information patient   Reason For Admission SOB   Lives With significant other   Do you expect to return to your current living situation? Yes   Do you have help at home or someone to help you manage your care at home? Yes   Who are your caregiver(s) and their phone number(s)? Rubens Carranza, gilma other, [phone]   Prior to hospitilization cognitive status: No Deficits;Alert/Oriented   Current cognitive status: Alert/Oriented;No Deficits   Walking or Climbing Stairs Difficulty ambulation difficulty, requires equipment   Dressing/Bathing Difficulty none   Home Accessibility wheelchair accessible   Home Layout Able to live on 1st floor   Equipment Currently Used at Home wheelchair;walker, rolling;glucometer  (BP cuff)   Readmission within 30 days? No   Patient currently being followed by outpatient case management? No   Do you currently have service(s) that help you manage your care at home? No   Do you take prescription medications? Yes   Do you have prescription coverage? Yes   Coverage [facility]   Do you have any problems affording any of your prescribed medications? No   Is the patient taking medications as prescribed? yes   Who is going to help you get home at discharge? sig other   How do you get to doctors appointments? car, drives self;family or friend will provide   Are you on dialysis? No   Do you take coumadin? No   Discharge Plan A Home with family   DME Needed Upon Discharge  walker, rolling;oxygen   Discharge Plan discussed with: Patient;Spouse/sig other   Name(s) and Number(s) Rubens [phone]   Discharge Barriers Identified None   Relationship/Environment   Name(s) of Who Lives With Patient Rubens   Pt stated that RW is from her mother and she would like one of her own. Will request rollator from team. Pt  may need potential O2.

## 2022-09-27 NOTE — PROGRESS NOTES
[facility] Family Medicine Progress Note      Subjective:   Brief HPI:  Linda Lorenz is a 46 y.o. female with Hx of shortness of breath hypertension, CVA (07/2021), EMMA, bipolar, anxiety who presented to [facility] ED on 9/26/2022 with complaints of worsening shortness of breath x1 week and lower extremity edema x2 days.  In addition she reports some presyncopal episode, weakness and lightheadedness.  She denies any syncope, recent URI, sick contacts, recent travel, nausea, vomiting, fever, palpitations or syncopal episodes.  She does endorse some generalized weakness, pleuritic chest pain,  spinning of the room (is chronic and dizziness.  She states that she has a sleep study that has been completed but she supposed to actually get the CPAP machine in about a month.  In the ED she was satting 93% on room air and desatted in the 60s in her sleep.  She states that she used to be on the CPAP but due to insurance reason she had to return the machine.  On physical exam patient demonstrates bilateral lower extremity edema, no JVD and otherwise unremarkable physical exam.  Internal Medicine was consulted on the case for hypoxia, shortness of breath secondary to bilateral pleural effusions in rule out possible PE     In the ED: BNP <10, troponin negative, sodium 142, potassium 3.8, BUN 25.3/creatinine 0.92 ,glucose 105, WBC 6.9, H/H 11.6/39.1, platelet 247  Chest x-ray: showed trace bilateral pleural effusions     Interval History:  NAOE.  Patient reports that her ribs were broken by coughing.  She was informed that that is not likely from a cough.  The nurse reported that she was complaining of increased pain to her ribs.  Will start Norco score today she also did not do well with the CPAP last night stating that it made her very anxious and claustrophobic.  For her anxiety will try to start buspirone.  She reports that she has been having chronic shortness of breath, lower extremity edema and coughing.  She reported that  she normally takes 80 mg of Lasix a day.  No desaturations reported overnight.    ROS:  As per HPI    Objective:   Vitals:  Temp: 99.1 °F (37.3 °C) (09/27 0418)  Pulse: 120 (09/27 0418)  Resp: 20 (09/27 0144)  BP: 109/63 (09/27 0418)  SpO2: 94 % (09/27 0418)    Physical Examination:  Constitutional: NAD  Head: Normocephalic, atraumatic  Eyes: PEERLA, EOMI, Anicteric conjunctiva  ENT: No lymphadenopathy, No thyromegaly  Lungs: CTAB, No crackles, No wheezes  Cardiovascular: Normal heart sounds, No MRG, No JVD  Abdomen: Soft, Nontender, No palpable hepatosplenomegaly, No abdominal masses, No ascites  Extremities: No cyanosis, No clubbing, +2 edema cielo  Neuro: Alert and oriented to person, place, and time, Reflexes and strength are normal  Skin: Warm, dry, no rashes    Laboratory:    Lab Results   Component Value Date    WBC 6.9 09/26/2022    HGB 11.6 (L) 09/26/2022    HCT 39.1 09/26/2022    MCV 95.8 (H) 09/26/2022     09/26/2022         BMP  Lab Results   Component Value Date     09/26/2022    K 3.8 09/26/2022    CO2 37 (H) 09/26/2022    BUN 25.3 (H) 09/26/2022    CREATININE 0.92 09/26/2022    CALCIUM 10.0 09/26/2022    EGFRNONAA >60 06/22/2022       ABG  No results for input(s): PH, PO2, PCO2, HCO3, BE in the last 168 hours.    Radiology:  X-Ray Chest PA And Lateral    Result Date: 9/26/2022  Trace bilateral pleural effusions. Electronically signed by: Alejandra Gauthier Date:    09/26/2022 Time:    13:04    CTA Chest Non-Coronary (PE Studies)    Result Date: 9/26/2022  Limited evaluation secondary to phase of contrast. There is no large or central thrombus identified. Acute to subacute fractures of the right 7th 8th, 9th, and 10th visualize ribs. Electronically signed by: Rakesh Chavez Date:    09/26/2022 Time:    20:40       Assessment & Plan:   Hypoxia  Oxygen desaturations  Rule out PE (wells criteria equal 3 moderate risk)  -Upon admit having oxygen desaturations, pleuritic chest pain  -order CTA PE  rule out PE  -chest x-ray:  Showed bilateral pleural effusions  -increased Lasix to Lasix 40 mg BID (BUN 25.3/creatinine 0.9 )  -CPAP at night (has history of CPAP use) was unable to tolerate  -consult to Case Management to see we can get a nasal mask for her  -order Tessalon Perles for the cough prn  -ordered Norco 7.5 mg/325 mg 1 tab every 6 hours p.r.n. pain for rib pain with coughing       Dizziness  Presyncopal episode  -Bilateral pleural effusions on chest x-ray  -physical exam with bilateral lower extremity edema +2 to the knees  -Output 1500 cc with lasix 40 mg, ordered for this am   -Echo with bubble study this a.m. to evaluate a cardiac function  -EKG normal sinus rhythm  -neuro checks q.2   -WNL TSH, free T4, A1c     HTN  -Continue home losartan 100 mg daily  -CMP, CBC, Mg, Phos in am . Keep K>4, Phos>3, Mg>2      Hx bipolar  History anxiety  -Continue  home Quetiapine 300 mg q.h.s., bupropion 150 mg bela  -ordered buspirone 10 mg b.i.d. for her anxiety      History anemia  -Continue home folic acid 1 mg daily    Code Status:  Full code  Consults:  None  Lines: PIV  Drains: none  Analgesia/Sedation:  None  Antibiotics:   Diet: NPO  Fluids:  LR at 100  Drips:  None  Oxygenation: Oxymask 2 L/min  DVT Prophylaxis: SCDs  GI Prophylaxis: Protonix 40 mg daily IV      Disposition:    Will darcie.  Continue to monitor electrolytes, diisabellee        Alesia Hernández MD  [employer] [facility] PGY2

## 2022-09-27 NOTE — PROCEDURES
"Linda Lorenz is a 46 y.o. female patient.    Temp: 97.6 °F (36.4 °C) (09/27/22 1115)  Pulse: 97 (09/27/22 1115)  Resp: 18 (09/27/22 1507)  BP: 128/72 (09/27/22 1115)  SpO2: 96 % (09/27/22 1128)  Weight: (!) 181.4 kg (400 lb) (09/27/22 0745)  Height: 5' 4" (162.6 cm) (09/27/22 0745)    PICC  Date/Time: 9/27/2022 4:19 PM  Performed by: Jamil Parra RN  Consent Done: Yes  Time out: Immediately prior to procedure a time out was called to verify the correct patient, procedure, equipment, support staff and site/side marked as required  Indications: med administration  Anesthesia: local infiltration  Local anesthetic: bupivacaine 0.5% without epinephrine  Anesthetic Total (mL): 5  Preparation: skin prepped with ChloraPrep  Skin prep agent dried: skin prep agent completely dried prior to procedure  Sterile barriers: all five maximum sterile barriers used - cap, mask, sterile gown, sterile gloves, and large sterile sheet  Hand hygiene: hand hygiene performed prior to central venous catheter insertion  Location details: left cephalic  Catheter type: single lumen  Catheter size: 4 Fr  Catheter Length: 15cm    Ultrasound guidance: yes  Vessel Caliber: large and patent, compressibility normal  Needle advanced into vessel with real time Ultrasound guidance.  Guidewire confirmed in vessel.  Sterile sheath used.  Number of attempts: 1        Jamil Parra  9/27/2022    "

## 2022-09-28 LAB
ALBUMIN SERPL-MCNC: 3.2 GM/DL (ref 3.5–5)
ALBUMIN/GLOB SERPL: 1 RATIO (ref 1.1–2)
ALP SERPL-CCNC: 56 UNIT/L (ref 40–150)
ALT SERPL-CCNC: 43 UNIT/L (ref 0–55)
ANION GAP SERPL CALC-SCNC: 10 MEQ/L
AST SERPL-CCNC: 35 UNIT/L (ref 5–34)
BASOPHILS # BLD AUTO: 0.02 X10(3)/MCL (ref 0–0.2)
BASOPHILS NFR BLD AUTO: 0.4 %
BILIRUBIN DIRECT+TOT PNL SERPL-MCNC: 0.4 MG/DL
BUN SERPL-MCNC: 13.7 MG/DL (ref 7–18.7)
BUN SERPL-MCNC: 15.8 MG/DL (ref 7–18.7)
CALCIUM SERPL-MCNC: 8.7 MG/DL (ref 8.4–10.2)
CALCIUM SERPL-MCNC: 9.1 MG/DL (ref 8.4–10.2)
CHLORIDE SERPL-SCNC: 93 MMOL/L (ref 98–107)
CHLORIDE SERPL-SCNC: 96 MMOL/L (ref 98–107)
CO2 SERPL-SCNC: 35 MMOL/L (ref 22–29)
CO2 SERPL-SCNC: 37 MMOL/L (ref 22–29)
CREAT SERPL-MCNC: 0.68 MG/DL (ref 0.55–1.02)
CREAT SERPL-MCNC: 0.7 MG/DL (ref 0.55–1.02)
CREAT/UREA NIT SERPL: 20
EOSINOPHIL # BLD AUTO: 0.13 X10(3)/MCL (ref 0–0.9)
EOSINOPHIL NFR BLD AUTO: 2.5 %
ERYTHROCYTE [DISTWIDTH] IN BLOOD BY AUTOMATED COUNT: 15.5 % (ref 11.5–17)
GFR SERPLBLD CREATININE-BSD FMLA CKD-EPI: >60 MLS/MIN/1.73/M2
GFR SERPLBLD CREATININE-BSD FMLA CKD-EPI: >60 MLS/MIN/1.73/M2
GLOBULIN SER-MCNC: 3.2 GM/DL (ref 2.4–3.5)
GLUCOSE SERPL-MCNC: 129 MG/DL (ref 74–100)
GLUCOSE SERPL-MCNC: 148 MG/DL (ref 74–100)
HCT VFR BLD AUTO: 34.1 % (ref 37–47)
HGB BLD-MCNC: 9.9 GM/DL (ref 12–16)
IMM GRANULOCYTES # BLD AUTO: 0.01 X10(3)/MCL (ref 0–0.04)
IMM GRANULOCYTES NFR BLD AUTO: 0.2 %
LYMPHOCYTES # BLD AUTO: 1.81 X10(3)/MCL (ref 0.6–4.6)
LYMPHOCYTES NFR BLD AUTO: 34.9 %
MAGNESIUM SERPL-MCNC: 1.7 MG/DL (ref 1.6–2.6)
MCH RBC QN AUTO: 28 PG (ref 27–31)
MCHC RBC AUTO-ENTMCNC: 29 MG/DL (ref 33–36)
MCV RBC AUTO: 96.6 FL (ref 80–94)
MONOCYTES # BLD AUTO: 0.44 X10(3)/MCL (ref 0.1–1.3)
MONOCYTES NFR BLD AUTO: 8.5 %
NEUTROPHILS # BLD AUTO: 2.8 X10(3)/MCL (ref 2.1–9.2)
NEUTROPHILS NFR BLD AUTO: 53.5 %
NRBC BLD AUTO-RTO: 0 %
PLATELET # BLD AUTO: 214 X10(3)/MCL (ref 130–400)
PMV BLD AUTO: 11.4 FL (ref 7.4–10.4)
POTASSIUM SERPL-SCNC: 3.1 MMOL/L (ref 3.5–5.1)
POTASSIUM SERPL-SCNC: 3.5 MMOL/L (ref 3.5–5.1)
PROT SERPL-MCNC: 6.4 GM/DL (ref 6.4–8.3)
RBC # BLD AUTO: 3.53 X10(6)/MCL (ref 4.2–5.4)
SODIUM SERPL-SCNC: 140 MMOL/L (ref 136–145)
SODIUM SERPL-SCNC: 141 MMOL/L (ref 136–145)
WBC # SPEC AUTO: 5.2 X10(3)/MCL (ref 4.5–11.5)

## 2022-09-28 PROCEDURE — 25000003 PHARM REV CODE 250: Performed by: INTERNAL MEDICINE

## 2022-09-28 PROCEDURE — C9113 INJ PANTOPRAZOLE SODIUM, VIA: HCPCS | Performed by: NURSE PRACTITIONER

## 2022-09-28 PROCEDURE — 25000003 PHARM REV CODE 250

## 2022-09-28 PROCEDURE — 63600175 PHARM REV CODE 636 W HCPCS: Performed by: STUDENT IN AN ORGANIZED HEALTH CARE EDUCATION/TRAINING PROGRAM

## 2022-09-28 PROCEDURE — 25000003 PHARM REV CODE 250: Performed by: NURSE PRACTITIONER

## 2022-09-28 PROCEDURE — 97162 PT EVAL MOD COMPLEX 30 MIN: CPT

## 2022-09-28 PROCEDURE — 21400001 HC TELEMETRY ROOM

## 2022-09-28 PROCEDURE — 80053 COMPREHEN METABOLIC PANEL: CPT | Performed by: NURSE PRACTITIONER

## 2022-09-28 PROCEDURE — 25000003 PHARM REV CODE 250: Performed by: STUDENT IN AN ORGANIZED HEALTH CARE EDUCATION/TRAINING PROGRAM

## 2022-09-28 PROCEDURE — 36415 COLL VENOUS BLD VENIPUNCTURE: CPT | Performed by: NURSE PRACTITIONER

## 2022-09-28 PROCEDURE — 97166 OT EVAL MOD COMPLEX 45 MIN: CPT

## 2022-09-28 PROCEDURE — 63600175 PHARM REV CODE 636 W HCPCS: Performed by: INTERNAL MEDICINE

## 2022-09-28 PROCEDURE — 94761 N-INVAS EAR/PLS OXIMETRY MLT: CPT

## 2022-09-28 PROCEDURE — 83735 ASSAY OF MAGNESIUM: CPT | Performed by: STUDENT IN AN ORGANIZED HEALTH CARE EDUCATION/TRAINING PROGRAM

## 2022-09-28 PROCEDURE — 99900035 HC TECH TIME PER 15 MIN (STAT)

## 2022-09-28 PROCEDURE — 85025 COMPLETE CBC W/AUTO DIFF WBC: CPT | Performed by: NURSE PRACTITIONER

## 2022-09-28 PROCEDURE — 63600175 PHARM REV CODE 636 W HCPCS: Performed by: NURSE PRACTITIONER

## 2022-09-28 PROCEDURE — A4216 STERILE WATER/SALINE, 10 ML: HCPCS | Performed by: INTERNAL MEDICINE

## 2022-09-28 RX ORDER — POTASSIUM CHLORIDE 20 MEQ/1
40 TABLET, EXTENDED RELEASE ORAL ONCE
Status: COMPLETED | OUTPATIENT
Start: 2022-09-28 | End: 2022-09-28

## 2022-09-28 RX ORDER — POTASSIUM CHLORIDE 7.45 MG/ML
10 INJECTION INTRAVENOUS
Status: COMPLETED | OUTPATIENT
Start: 2022-09-28 | End: 2022-09-28

## 2022-09-28 RX ORDER — POTASSIUM CHLORIDE 750 MG/1
30 CAPSULE, EXTENDED RELEASE ORAL ONCE
Status: COMPLETED | OUTPATIENT
Start: 2022-09-28 | End: 2022-09-28

## 2022-09-28 RX ADMIN — FOLIC ACID 1 MG: 1 TABLET ORAL at 09:09

## 2022-09-28 RX ADMIN — BUPROPION HYDROCHLORIDE 150 MG: 150 TABLET, FILM COATED, EXTENDED RELEASE ORAL at 09:09

## 2022-09-28 RX ADMIN — BUSPIRONE HYDROCHLORIDE 10 MG: 10 TABLET ORAL at 09:09

## 2022-09-28 RX ADMIN — BENZONATATE 100 MG: 100 CAPSULE ORAL at 05:09

## 2022-09-28 RX ADMIN — POTASSIUM CHLORIDE 10 MEQ: 7.45 INJECTION INTRAVENOUS at 03:09

## 2022-09-28 RX ADMIN — PANTOPRAZOLE SODIUM 40 MG: 40 INJECTION, POWDER, FOR SOLUTION INTRAVENOUS at 09:09

## 2022-09-28 RX ADMIN — BUSPIRONE HYDROCHLORIDE 10 MG: 10 TABLET ORAL at 08:09

## 2022-09-28 RX ADMIN — Medication 10 ML: at 05:09

## 2022-09-28 RX ADMIN — BENZONATATE 100 MG: 100 CAPSULE ORAL at 11:09

## 2022-09-28 RX ADMIN — ENOXAPARIN SODIUM 40 MG: 40 INJECTION SUBCUTANEOUS at 08:09

## 2022-09-28 RX ADMIN — HYDROCODONE BITARTRATE AND ACETAMINOPHEN 1 TABLET: 7.5; 325 TABLET ORAL at 02:09

## 2022-09-28 RX ADMIN — POTASSIUM CHLORIDE 10 MEQ: 7.45 INJECTION INTRAVENOUS at 05:09

## 2022-09-28 RX ADMIN — POTASSIUM CHLORIDE 10 MEQ: 7.45 INJECTION INTRAVENOUS at 01:09

## 2022-09-28 RX ADMIN — HYDROCODONE BITARTRATE AND ACETAMINOPHEN 1 TABLET: 7.5; 325 TABLET ORAL at 05:09

## 2022-09-28 RX ADMIN — POTASSIUM CHLORIDE 40 MEQ: 1500 TABLET, EXTENDED RELEASE ORAL at 08:09

## 2022-09-28 RX ADMIN — POTASSIUM CHLORIDE 10 MEQ: 7.45 INJECTION INTRAVENOUS at 06:09

## 2022-09-28 RX ADMIN — POTASSIUM CHLORIDE 10 MEQ: 7.45 INJECTION INTRAVENOUS at 02:09

## 2022-09-28 RX ADMIN — FUROSEMIDE 40 MG: 10 INJECTION INTRAMUSCULAR; INTRAVENOUS at 08:09

## 2022-09-28 RX ADMIN — MAGNESIUM SULFATE IN DEXTROSE 1 G: 10 INJECTION, SOLUTION INTRAVENOUS at 01:09

## 2022-09-28 RX ADMIN — FUROSEMIDE 40 MG: 10 INJECTION INTRAMUSCULAR; INTRAVENOUS at 09:09

## 2022-09-28 RX ADMIN — POTASSIUM CHLORIDE 30 MEQ: 10 CAPSULE, COATED, EXTENDED RELEASE ORAL at 01:09

## 2022-09-28 RX ADMIN — BENZONATATE 100 MG: 100 CAPSULE ORAL at 02:09

## 2022-09-28 RX ADMIN — ENOXAPARIN SODIUM 40 MG: 40 INJECTION SUBCUTANEOUS at 09:09

## 2022-09-28 RX ADMIN — LIDOCAINE 1 PATCH: 50 PATCH TOPICAL at 10:09

## 2022-09-28 RX ADMIN — POTASSIUM CHLORIDE 10 MEQ: 7.45 INJECTION INTRAVENOUS at 04:09

## 2022-09-28 RX ADMIN — QUETIAPINE FUMARATE 300 MG: 100 TABLET ORAL at 08:09

## 2022-09-28 RX ADMIN — HYDROCODONE BITARTRATE AND ACETAMINOPHEN 1 TABLET: 7.5; 325 TABLET ORAL at 11:09

## 2022-09-28 RX ADMIN — Medication 10 ML: at 02:09

## 2022-09-28 RX ADMIN — LOSARTAN POTASSIUM 100 MG: 25 TABLET, FILM COATED ORAL at 09:09

## 2022-09-28 NOTE — PT/OT/SLP EVAL
Physical Therapy Evaluation    Patient Name:  Linda Lorenz   MRN:  [id number]    Recommendations:     Discharge Recommendations:  home health PT (Pt. will require 24 hour supervision/assistance 2* to being a high fall risk.)   Discharge Equipment Recommendations: walker, rolling   Barriers to discharge: Endurance    Assessment:     Linda Lorenz is a 46 y.o. female admitted with a medical diagnosis of   1. Hypoxia    2. Chest pain    3. Chest pain, unspecified type    4. EMMA (obstructive sleep apnea)       She presents with the following impairments/functional limitations:  weakness, impaired endurance, impaired self care skills, decreased upper extremity function, impaired functional mobility, decreased lower extremity function, gait instability, impaired balance, pain.    Rehab Prognosis: Fair; patient would benefit from acute skilled PT services to address these deficits and reach maximum level of function.    Recent Surgery: * No surgery found *      Plan:     During this hospitalization, patient to be seen  (3-5x per week) to address the identified rehab impairments via gait training, therapeutic activities, therapeutic exercises and progress toward the following goals:    Plan of Care Expires:  10/28/22    Subjective     Chief Complaint: R ribs pain  Patient/Family Comments/goals: Go home.  Pain/Comfort:  Pain Rating 1: 0/10  Location - Side 1: Right  Location 1: rib(s)  Pain Addressed 1: Reposition  Pain Rating Post-Intervention 1: 8/10     Pre session vitals Post session vitals   BP mmHG 134/81    HR bpm 111 136   Ox sats % 100% with 2 L O2 98% with 2 L O2     Ambulatory Stats:  REST:   With 2 L O2: 100%  No O2: 99%  DURING GAIT:  Decreased to 87% after walking 70 feet   Applied 2L O2, increased to 98%    Patients cultural, spiritual, Sabianist conflicts given the current situation: no    Living Environment:  Pt. Lives with  in a one story home with no steps to enter.  Pt. Has a  tub/shower combo.  Prior to admission, patients level of function was assistance with ADLs and ambulation with a very old RW.  Equipment used at home: bath bench, wheelchair (old RW, that belonged to pt.'s mother).  DME owned (not currently used): none.  Upon discharge, patient will have assistance from .    Objective:     Communicated with nurse (Liseth) prior to session.  Patient found supine with oxygen, telemetry, PICC line  upon PT entry to room.    General Precautions: Standard, fall   Orthopedic Precautions:N/A   Braces: N/A  Respiratory Status: Nasal cannula, flow 2 L/min    Exams:  Cognitive Exam:  Patient is oriented to Person, Place, Time, and Situation  Sensation:    -       Intact  Skin Integrity/Edema:      -       Edema: Moderate B LEs  RUE ROM: limited shoulder flexion 2* to rib pain   RUE Strength: see OT eval  LUE ROM: see OT eval  LUE Strength: see OT eval  RLE ROM: WFL  RLE Strength: 3+/5  LLE ROM: WFL  LLE Strength: 3+/5    Functional Mobility:  Bed Mobility:     Rolling Right: supervision  Supine to Sit: supervision  Transfers:     Sit to Stand:  stand by assistance with rolling walker  Bed to Chair: stand by assistance with  no AD  using  Stand Pivot  Gait: x70 feet with RW and CGA for safety; increase SOB, slow      Balance:   Static Sit: FAIR+: Able to take MINIMAL challenges from all directions  Dynamic Sit: FAIR+: Maintains balance through MINIMAL excursions of active trunk motion  Static Stand: FAIR: Maintains without assist but unable to take challenges  Dynamic stand: FAIR: Needs CONTACT GUARD during gait      Patient left up in chair with all lines intact, call button in reach, nurse notified, and  present.    GOALS:   Multidisciplinary Problems       Physical Therapy Goals          Problem: Physical Therapy    Goal Priority Disciplines Outcome Goal Variances Interventions   Physical Therapy Goal     PT, PT/OT      Description: Goals to be met by: Discharge    Pt. Will  increase functional mobility by performin. Bed mobility with MOD I.  2. Sit<>stand with SPV  3. Pt. Will ambulate x 100 feet with RW and SBA.                       History:     Past Medical History:   Diagnosis Date    CVA (cerebral vascular accident)     Hypertension        Past Surgical History:   Procedure Laterality Date     SECTION      CHOLECYSTECTOMY      TUBAL LIGATION         Time Tracking:     PT Received On: 22  PT Start Time: 900     PT Stop Time: 931  PT Total Time (min): 31 min     Billable Minutes: Evaluation 31 minutes      2022

## 2022-09-28 NOTE — PROGRESS NOTES
[facility] Family Medicine Progress Note      Subjective:   Brief HPI:  Linda Lorenz is a 46 y.o. female with Hx of shortness of breath hypertension, CVA (07/2021), EMMA, bipolar, anxiety who presented to [facility] ED on 9/26/2022 with complaints of worsening shortness of breath x1 week and lower extremity edema x2 days.  In addition she reports some presyncopal episode, weakness and lightheadedness.  She denies any syncope, recent URI, sick contacts, recent travel, nausea, vomiting, fever, palpitations or syncopal episodes.  She does endorse some generalized weakness, pleuritic chest pain,  spinning of the room (is chronic and dizziness.  She states that she has a sleep study that has been completed but she supposed to actually get the CPAP machine in about a month.  In the ED she was satting 93% on room air and desatted in the 60s in her sleep.  She states that she used to be on the CPAP but due to insurance reason she had to return the machine.  On physical exam patient demonstrates bilateral lower extremity edema, no JVD and otherwise unremarkable physical exam.  Internal Medicine was consulted on the case for hypoxia, shortness of breath secondary to bilateral pleural effusions in rule out possible PE     In the ED: BNP <10, troponin negative, sodium 142, potassium 3.8, BUN 25.3/creatinine 0.92 ,glucose 105, WBC 6.9, H/H 11.6/39.1, platelet 247  Chest x-ray: showed trace bilateral pleural effusions     Interval History:  NAOE.  Waiting for case management to get home health.  Patient to keep her appointment for her sleep study the end of October.  Patient to be discharge with home health services.  Patient to be discharged on Ozympic for assistance with weight loss.    ROS:  As per HPI    Objective:   Vitals:  Temp: 97.5 °F (36.4 °C) (09/28 1545)  Pulse: 96 (09/28 1545)  Resp: 20 (09/28 1632)  BP: 126/78 (09/28 1545)  SpO2: 99 % (09/28 1632)    Physical Examination:  Constitutional: NAD  Head: Normocephalic,  atraumatic  Eyes: PEERLA, EOMI, Anicteric conjunctiva  ENT: No lymphadenopathy, No thyromegaly  Lungs: CTAB, No crackles, No wheezes  Cardiovascular: Normal heart sounds, No MRG, No JVD  Abdomen: Soft, Nontender, No palpable hepatosplenomegaly, No abdominal masses, No ascites  Extremities: No cyanosis, No clubbing, +2 edema cielo  Neuro: Alert and oriented to person, place, and time, Reflexes and strength are normal  Skin: Warm, dry, no rashes    Laboratory:    Lab Results   Component Value Date    WBC 5.2 09/28/2022    HGB 9.9 (L) 09/28/2022    HCT 34.1 (L) 09/28/2022    MCV 96.6 (H) 09/28/2022     09/28/2022         BMP  Lab Results   Component Value Date     09/28/2022    K 3.5 09/28/2022    CO2 37 (H) 09/28/2022    BUN 13.7 09/28/2022    CREATININE 0.70 09/28/2022    CALCIUM 9.1 09/28/2022    EGFRNONAA >60 06/22/2022       ABG  No results for input(s): PH, PO2, PCO2, HCO3, BE in the last 168 hours.    Radiology:  X-Ray Chest PA And Lateral    Result Date: 9/26/2022  Trace bilateral pleural effusions. Electronically signed by: Alejandra Gauthier Date:    09/26/2022 Time:    13:04    CTA Chest Non-Coronary (PE Studies)    Result Date: 9/26/2022  Limited evaluation secondary to phase of contrast. There is no large or central thrombus identified. Acute to subacute fractures of the right 7th 8th, 9th, and 10th visualize ribs. Electronically signed by: Rakesh Chavez Date:    09/26/2022 Time:    20:40       Assessment & Plan:   Hypoxia  Oxygen desaturations  Rule out PE (wells criteria equal 3 moderate risk)  -Upon admit having oxygen desaturations, pleuritic chest pain  -order CTA PE rule out PE  -chest x-ray:  Showed bilateral pleural effusions  -increased Lasix to Lasix 40 mg BID (BUN 25.3/creatinine 0.9 )  -CPAP at night (has history of CPAP use) was unable to tolerate  -consult to Case Management to see we can get a nasal mask for her  -order Tessalon Perles for the cough prn  -ordered Norco 7.5 mg/325  mg 1 tab every 6 hours p.r.n. pain for rib pain with coughing  -ABG on room air with PaO2 at 47.  Consult to case management for home O2  -She has been accepted for home health services.  -Discharge on Ozympic for weight loss.  -Patient needs to complete her sleep study prior to be able to obtain her CPAP machine.  - approved  -will discharge today       Dizziness  Presyncopal episode  -Bilateral pleural effusions on chest x-ray  -physical exam with bilateral lower extremity edema +2 to the knees  -Output 1500 cc with lasix 40 mg, ordered for this am   -Echo with bubble study this a.m. to evaluate a cardiac function  -EKG normal sinus rhythm  -neuro checks q.2   -WNL TSH, free T4, A1c     HTN  -Continue home losartan 100 mg daily  -CMP, CBC, Mg, Phos in am . Keep K>4, Phos>3, Mg>2      Hx bipolar  History anxiety  -Continue  home Quetiapine 300 mg q.h.s., bupropion 150 mg beal  -ordered buspirone 10 mg b.i.d. for her anxiety      History anemia  -Continue home folic acid 1 mg daily    Code Status:  Full code  Consults:  None  Lines: PIV  Drains: none  Analgesia/Sedation:  None  Antibiotics:   Diet: NPO  Fluids:  LR at 100  Drips:  None  Oxygenation: Oxymask 2 L/min  DVT Prophylaxis: SCDs  GI Prophylaxis: Protonix 40 mg daily IV      Disposition:    Discharged today home with home health services and home O2.       Alesia Hernández MD  [employer] [facility] PGY2

## 2022-09-28 NOTE — CONSULTS
Consult for CPAP with nasal mask. Spoke with Dr Hernández and she said that pt has sleep study scheduled at the end of this month and that is when they will make the determination on her CPAP. I notified her that without those results, pt would have to pay for CPAP OOP. She stated understanding and said that pt will F/U after sleep study is done.

## 2022-09-28 NOTE — PLAN OF CARE
Problem: Occupational Therapy  Goal: Occupational Therapy Goal  Description: Goals to be met by: d/c     Patient will increase functional independence with ADLs by performing:    LE Dressing with Supervision for shorts only  Grooming while standing at sink with Supervision.  Toilet transfer to bedside commode with Supervision.  Pt education on HEP for R UE with [place] performing ROM ex as tolerated.       Outcome: Ongoing, Progressing

## 2022-09-28 NOTE — PROGRESS NOTES
[facility]ospitals Family Medicine Progress Note      Subjective:   Brief HPI:  Linda Lorenz is a 46 y.o. female with Hx of shortness of breath hypertension, CVA (07/2021), EMMA, bipolar, anxiety who presented to [facility] ED on 9/26/2022 with complaints of worsening shortness of breath x1 week and lower extremity edema x2 days.  In addition she reports some presyncopal episode, weakness and lightheadedness.  She denies any syncope, recent URI, sick contacts, recent travel, nausea, vomiting, fever, palpitations or syncopal episodes.  She does endorse some generalized weakness, pleuritic chest pain,  spinning of the room (is chronic and dizziness.  She states that she has a sleep study that has been completed but she supposed to actually get the CPAP machine in about a month.  In the ED she was satting 93% on room air and desatted in the 60s in her sleep.  She states that she used to be on the CPAP but due to insurance reason she had to return the machine.  On physical exam patient demonstrates bilateral lower extremity edema, no JVD and otherwise unremarkable physical exam.  Internal Medicine was consulted on the case for hypoxia, shortness of breath secondary to bilateral pleural effusions in rule out possible PE     In the ED: BNP <10, troponin negative, sodium 142, potassium 3.8, BUN 25.3/creatinine 0.92 ,glucose 105, WBC 6.9, H/H 11.6/39.1, platelet 247  Chest x-ray: showed trace bilateral pleural effusions     Interval History:  NAOE.  I was able to speak to the patient in private regarding her claim of coughing a break-in 6 ribs.  She denies any domestic violence and feels safe in her home.  Ambulatory sats were 99/100%.  Case management was consulted for home health.  Patient will require outpatient does Ozympic to assist with weight loss efforts.  Case management has set the [facility] and was accepted.    Patient will need to obtain her sleep study prior to be able to get a CPAP machine.  We are  unable to supply her with a CPAP machine until this test is performed.    ROS:  As per HPI    Objective:   Vitals:  Temp: 97.5 °F (36.4 °C) (09/28 1545)  Pulse: 96 (09/28 1545)  Resp: 20 (09/28 1632)  BP: 126/78 (09/28 1545)  SpO2: 99 % (09/28 1632)    Physical Examination:  Constitutional: NAD  Head: Normocephalic, atraumatic  Eyes: PEERLA, EOMI, Anicteric conjunctiva  ENT: No lymphadenopathy, No thyromegaly  Lungs: CTAB, No crackles, No wheezes  Cardiovascular: Normal heart sounds, No MRG, No JVD  Abdomen: Soft, Nontender, No palpable hepatosplenomegaly, No abdominal masses, No ascites  Extremities: No cyanosis, No clubbing, +2 edema cielo  Neuro: Alert and oriented to person, place, and time, Reflexes and strength are normal  Skin: Warm, dry, no rashes    Laboratory:    Lab Results   Component Value Date    WBC 5.2 09/28/2022    HGB 9.9 (L) 09/28/2022    HCT 34.1 (L) 09/28/2022    MCV 96.6 (H) 09/28/2022     09/28/2022         BMP  Lab Results   Component Value Date     09/28/2022    K 3.1 (L) 09/28/2022    CO2 35 (H) 09/28/2022    BUN 15.8 09/28/2022    CREATININE 0.68 09/28/2022    CALCIUM 8.7 09/28/2022    EGFRNONAA >60 06/22/2022       ABG  No results for input(s): PH, PO2, PCO2, HCO3, BE in the last 168 hours.    Radiology:  X-Ray Chest PA And Lateral    Result Date: 9/26/2022  Trace bilateral pleural effusions. Electronically signed by: Alejandra Gauthier Date:    09/26/2022 Time:    13:04    CTA Chest Non-Coronary (PE Studies)    Result Date: 9/26/2022  Limited evaluation secondary to phase of contrast. There is no large or central thrombus identified. Acute to subacute fractures of the right 7th 8th, 9th, and 10th visualize ribs. Electronically signed by: Rakesh Chavez Date:    09/26/2022 Time:    20:40       Assessment & Plan:   Hypoxia  Oxygen desaturations  Rule out PE (wells criteria equal 3 moderate risk)  -Upon admit having oxygen desaturations, pleuritic chest pain  -order CTA PE rule out  PE  -chest x-ray:  Showed bilateral pleural effusions  -increased Lasix to Lasix 40 mg BID (BUN 25.3/creatinine 0.9 )  -CPAP at night (has history of CPAP use) was unable to tolerate  -consult to Case Management to see we can get a nasal mask for her  -order Tessalon Perles for the cough prn  -ordered Norco 7.5 mg/325 mg 1 tab every 6 hours p.r.n. pain for rib pain with coughing  -ABG on room air with PaO2 at 47.  Consult to case management for home O2  -She has been accepted for home health services.  -Discharge on Ozympic for weight loss.  -Patient needs to complete her sleep study prior to be able to obtain her CPAP machine.  -Cnsult ordered case management for home O2.       Dizziness  Presyncopal episode  -Bilateral pleural effusions on chest x-ray  -physical exam with bilateral lower extremity edema +2 to the knees  -Output 1500 cc with lasix 40 mg, ordered for this am   -Echo with bubble study this a.m. to evaluate a cardiac function  -EKG normal sinus rhythm  -neuro checks q.2   -WNL TSH, free T4, A1c     HTN  -Continue home losartan 100 mg daily  -CMP, CBC, Mg, Phos in am . Keep K>4, Phos>3, Mg>2      Hx bipolar  History anxiety  -Continue  home Quetiapine 300 mg q.h.s., bupropion 150 mg bela  -ordered buspirone 10 mg b.i.d. for her anxiety      History anemia  -Continue home folic acid 1 mg daily    Code Status:  Full code  Consults:  None  Lines: PIV  Drains: none  Analgesia/Sedation:  None  Antibiotics:   Diet: NPO  Fluids:  LR at 100  Drips:  None  Oxygenation: Oxymask 2 L/min  DVT Prophylaxis: SCDs  GI Prophylaxis: Protonix 40 mg daily IV      Disposition:    Will maty.  Continue to monitor electrolytes, maty Hernández MD  [employer] [facility] PGY2

## 2022-09-28 NOTE — CONSULTS
HH consult. Spoke with pt and she was in agreement with no preference, just wanted agency covered by insurance. Sent referral to [facility]. Will follow.    [facility] has accepted pt.

## 2022-09-28 NOTE — PT/OT/SLP EVAL
"Occupational Therapy   Evaluation    Name: Linda Lorenz  MRN: [id number]  Admitting Diagnosis:    Hypoxia with O2 desats  Dizziness with presyncopal episode  HTN    Hx bipolar  Hx anxiety  Hx anemia    Recent Surgery: * No surgery found *      Recommendations:     Discharge Recommendations: home with home health, home health OT, other (see comments) (home with responsible caregiver)  Discharge Equipment Recommendations:  walker, rolling  Barriers to discharge:  Other (Comment) (skill level of assist at this time)    Assessment:     Linda Lorenz is a 46 y.o. female with a medical diagnosis of see above.  She presents with functional decline. Performance deficits affecting function: weakness, impaired endurance, impaired self care skills, impaired functional mobility, impaired balance, decreased upper extremity function, pain, impaired coordination, impaired cardiopulmonary response to activity.      Rehab Prognosis: Fair; patient would benefit from acute skilled OT services to address these deficits and reach maximum level of function.       Plan:     Patient to be seen  (Mon-Fri; 2-5 times per week) to address the above listed problems via self-care/home management, therapeutic activities, therapeutic exercises  Plan of Care Expires:    Plan of Care Reviewed with: patient, spouse    Subjective     Chief Complaint: rib pain and fatigue  Patient/Family Comments/goals: go home    Occupational Profile:  Living Environment: Pt lives with spouse in single story house  with no steps; Pt has tub/shower combo with bath tub bench  Previous level of function: Pt ambulated household level with old "bent" RW and supervision; Pt required assist with basic self care tasks;   Roles and Routines: Pt assists "occasionally" with cooking and general household chores  Equipment Used at Home:  wheelchair, glucometer, bath bench  Assistance upon Discharge: Pt will have assist from spouse    Pain/Comfort:  Pain Rating " 1: 8/10  Location - Side 1: Right  Location 1: rib(s) (with movement only ; 0/10 at rest)  Pain Addressed 1: Cessation of Activity, Nurse notified, Reposition    Patients cultural, spiritual, Temple conflicts given the current situation: no    Objective:     Communicated with: Nurse Childers prior to session.  Patient found supine with telemetry, PICC line upon OT entry to room.    General Precautions: Standard, fall   Orthopedic Precautions:N/A   Braces: N/A  Respiratory Status: Nasal cannula, flow 2 L/min    VITALS  Presession supine  Supine without O2 Ambulate without O2   BP  134/81  HR  111  O2 100%   99%   After 70 '  87% then donned O2 @ 2L        And O2 increased to 98%       Occupational Performance:    Bed Mobility:    Patient completed Supine to Sit with supervision    Functional Mobility/Transfers:  Patient completed Sit <> Stand Transfer with stand by assistance  with  no assistive device   Patient completed Bed <> Chair Transfer using Stand Pivot technique with stand by assistance with no assistive device  Functional Mobility: ambulated with PT/OT assisted  with CGA and bariatric RW 70 feet    Activities of Daily Living:  Feeding:  modified independence d/t limited R shoulder AROM  Grooming: stand by assistance sitting in bedside chair  Lower Body Dressing: total assistance socks with poor attempt and tolerance d/t R rib pain and obesity ; shorts -simulated with total assist threading shorts over feet; and per nursing staff min A up/down over hips as noted during toileting routine    Endurance:  Poor to fair for basic self care tasks    Cognitive/Visual Perceptual:  Alert and oriented x 4; followed basic commands;     Physical Exam:  Balance: -       sitting EOB WFL static and limited excursions  tolerated due to pain  right rib cage ; standing static SBA and dynamic CGA  during basic self care tasks    UE exam:  Sensation - intact  Dominant UE- Right  R UE A/PROM- max impaired AROM shoulder d/t R rib  cage pain/PROM grossly WFL ; WFL distally;   L UE A/PROM- WFL  R UE strength - NT R d/t pain ; WFL distally  L UE strength - WFL   FMC - intact finger to thumb opposition  GMC - intact L UE ; impaired R UE       Treatment & Education:  Pt educated on POC ; initiated education on R UE ROM ex as tolerated;     Patient left up in chair with all lines intact, call button in reach, and Nurse Liseth notified    GOALS:   Multidisciplinary Problems       Occupational Therapy Goals          Problem: Occupational Therapy    Goal Priority Disciplines Outcome Interventions   Occupational Therapy Goal     OT, PT/OT Ongoing, Progressing    Description: Goals to be met by: d/c     Patient will increase functional independence with ADLs by performing:    LE Dressing with Supervision for shorts only  Grooming while standing at sink with Supervision.  Toilet transfer to bedside commode with Supervision.  Pt education on HEP for R UE with [place] performing ROM ex as tolerated.                            History:     Past Medical History:   Diagnosis Date    CVA (cerebral vascular accident)     Hypertension          Past Surgical History:   Procedure Laterality Date     SECTION      CHOLECYSTECTOMY      TUBAL LIGATION         Time Tracking:     OT Date of Treatment: 22  OT Start Time: 859  OT Stop Time: 936  OT Total Time (min): 37 min    Billable Minutes:Evaluation 37 min co eval with PT    2022

## 2022-09-29 VITALS
RESPIRATION RATE: 16 BRPM | HEIGHT: 64 IN | HEART RATE: 105 BPM | TEMPERATURE: 98 F | OXYGEN SATURATION: 99 % | DIASTOLIC BLOOD PRESSURE: 72 MMHG | SYSTOLIC BLOOD PRESSURE: 111 MMHG | WEIGHT: 293 LBS | BODY MASS INDEX: 50.02 KG/M2

## 2022-09-29 PROBLEM — R09.02 HYPOXIA: Status: ACTIVE | Noted: 2022-09-29

## 2022-09-29 LAB
ALBUMIN SERPL-MCNC: 3.2 GM/DL (ref 3.5–5)
ALBUMIN/GLOB SERPL: 1 RATIO (ref 1.1–2)
ALP SERPL-CCNC: 58 UNIT/L (ref 40–150)
ALT SERPL-CCNC: 44 UNIT/L (ref 0–55)
AST SERPL-CCNC: 35 UNIT/L (ref 5–34)
BASOPHILS # BLD AUTO: 0.02 X10(3)/MCL (ref 0–0.2)
BASOPHILS NFR BLD AUTO: 0.4 %
BILIRUBIN DIRECT+TOT PNL SERPL-MCNC: 0.5 MG/DL
BUN SERPL-MCNC: 14.2 MG/DL (ref 7–18.7)
CALCIUM SERPL-MCNC: 8.9 MG/DL (ref 8.4–10.2)
CHLORIDE SERPL-SCNC: 97 MMOL/L (ref 98–107)
CO2 SERPL-SCNC: 34 MMOL/L (ref 22–29)
CREAT SERPL-MCNC: 0.67 MG/DL (ref 0.55–1.02)
EOSINOPHIL # BLD AUTO: 0.18 X10(3)/MCL (ref 0–0.9)
EOSINOPHIL NFR BLD AUTO: 3.4 %
ERYTHROCYTE [DISTWIDTH] IN BLOOD BY AUTOMATED COUNT: 15.3 % (ref 11.5–17)
GFR SERPLBLD CREATININE-BSD FMLA CKD-EPI: >60 MLS/MIN/1.73/M2
GLOBULIN SER-MCNC: 3.3 GM/DL (ref 2.4–3.5)
GLUCOSE SERPL-MCNC: 122 MG/DL (ref 74–100)
HCT VFR BLD AUTO: 34 % (ref 37–47)
HGB BLD-MCNC: 10.2 GM/DL (ref 12–16)
IMM GRANULOCYTES # BLD AUTO: 0.01 X10(3)/MCL (ref 0–0.04)
IMM GRANULOCYTES NFR BLD AUTO: 0.2 %
LYMPHOCYTES # BLD AUTO: 1.71 X10(3)/MCL (ref 0.6–4.6)
LYMPHOCYTES NFR BLD AUTO: 32.1 %
MCH RBC QN AUTO: 28.8 PG (ref 27–31)
MCHC RBC AUTO-ENTMCNC: 30 MG/DL (ref 33–36)
MCV RBC AUTO: 96 FL (ref 80–94)
MONOCYTES # BLD AUTO: 0.46 X10(3)/MCL (ref 0.1–1.3)
MONOCYTES NFR BLD AUTO: 8.6 %
NEUTROPHILS # BLD AUTO: 3 X10(3)/MCL (ref 2.1–9.2)
NEUTROPHILS NFR BLD AUTO: 55.3 %
NRBC BLD AUTO-RTO: 0 %
PLATELET # BLD AUTO: 207 X10(3)/MCL (ref 130–400)
PMV BLD AUTO: 10.9 FL (ref 7.4–10.4)
POTASSIUM SERPL-SCNC: 3.6 MMOL/L (ref 3.5–5.1)
PROT SERPL-MCNC: 6.5 GM/DL (ref 6.4–8.3)
RBC # BLD AUTO: 3.54 X10(6)/MCL (ref 4.2–5.4)
SODIUM SERPL-SCNC: 141 MMOL/L (ref 136–145)
WBC # SPEC AUTO: 5.3 X10(3)/MCL (ref 4.5–11.5)

## 2022-09-29 PROCEDURE — 63600175 PHARM REV CODE 636 W HCPCS: Performed by: NURSE PRACTITIONER

## 2022-09-29 PROCEDURE — 36415 COLL VENOUS BLD VENIPUNCTURE: CPT | Performed by: NURSE PRACTITIONER

## 2022-09-29 PROCEDURE — 99900035 HC TECH TIME PER 15 MIN (STAT)

## 2022-09-29 PROCEDURE — 25000003 PHARM REV CODE 250: Performed by: NURSE PRACTITIONER

## 2022-09-29 PROCEDURE — C9113 INJ PANTOPRAZOLE SODIUM, VIA: HCPCS | Performed by: NURSE PRACTITIONER

## 2022-09-29 PROCEDURE — 94761 N-INVAS EAR/PLS OXIMETRY MLT: CPT

## 2022-09-29 PROCEDURE — 80053 COMPREHEN METABOLIC PANEL: CPT | Performed by: NURSE PRACTITIONER

## 2022-09-29 PROCEDURE — 63600175 PHARM REV CODE 636 W HCPCS: Performed by: INTERNAL MEDICINE

## 2022-09-29 PROCEDURE — 94760 N-INVAS EAR/PLS OXIMETRY 1: CPT

## 2022-09-29 PROCEDURE — A4216 STERILE WATER/SALINE, 10 ML: HCPCS | Performed by: INTERNAL MEDICINE

## 2022-09-29 PROCEDURE — 27000221 HC OXYGEN, UP TO 24 HOURS

## 2022-09-29 PROCEDURE — 97535 SELF CARE MNGMENT TRAINING: CPT

## 2022-09-29 PROCEDURE — 85025 COMPLETE CBC W/AUTO DIFF WBC: CPT | Performed by: NURSE PRACTITIONER

## 2022-09-29 PROCEDURE — 25000003 PHARM REV CODE 250: Performed by: INTERNAL MEDICINE

## 2022-09-29 RX ORDER — BENZONATATE 100 MG/1
100 CAPSULE ORAL 3 TIMES DAILY PRN
Qty: 30 CAPSULE | Refills: 1 | Status: SHIPPED | OUTPATIENT
Start: 2022-09-29 | End: 2022-09-29 | Stop reason: SDUPTHER

## 2022-09-29 RX ORDER — LOSARTAN POTASSIUM 100 MG/1
100 TABLET ORAL DAILY
Qty: 90 TABLET | Refills: 3 | Status: SHIPPED | OUTPATIENT
Start: 2022-09-29 | End: 2023-09-29

## 2022-09-29 RX ORDER — LOSARTAN POTASSIUM 100 MG/1
100 TABLET ORAL DAILY
Qty: 90 TABLET | Refills: 3 | Status: SHIPPED | OUTPATIENT
Start: 2022-09-29 | End: 2022-09-29 | Stop reason: SDUPTHER

## 2022-09-29 RX ORDER — SEMAGLUTIDE 1.34 MG/ML
0.25 INJECTION, SOLUTION SUBCUTANEOUS
Qty: 1 PEN | Refills: 2 | Status: SHIPPED | OUTPATIENT
Start: 2022-09-29 | End: 2022-09-29 | Stop reason: SDUPTHER

## 2022-09-29 RX ORDER — BENZONATATE 100 MG/1
100 CAPSULE ORAL 3 TIMES DAILY PRN
Qty: 30 CAPSULE | Refills: 1 | Status: SHIPPED | OUTPATIENT
Start: 2022-09-29 | End: 2022-10-09

## 2022-09-29 RX ORDER — SEMAGLUTIDE 1.34 MG/ML
0.25 INJECTION, SOLUTION SUBCUTANEOUS
Qty: 1 PEN | Refills: 2 | Status: ON HOLD | OUTPATIENT
Start: 2022-09-29 | End: 2022-11-01

## 2022-09-29 RX ADMIN — FUROSEMIDE 40 MG: 10 INJECTION INTRAMUSCULAR; INTRAVENOUS at 08:09

## 2022-09-29 RX ADMIN — BUPROPION HYDROCHLORIDE 150 MG: 150 TABLET, FILM COATED, EXTENDED RELEASE ORAL at 08:09

## 2022-09-29 RX ADMIN — FOLIC ACID 1 MG: 1 TABLET ORAL at 08:09

## 2022-09-29 RX ADMIN — Medication 10 ML: at 12:09

## 2022-09-29 RX ADMIN — LOSARTAN POTASSIUM 100 MG: 25 TABLET, FILM COATED ORAL at 08:09

## 2022-09-29 RX ADMIN — BUSPIRONE HYDROCHLORIDE 10 MG: 10 TABLET ORAL at 08:09

## 2022-09-29 RX ADMIN — Medication 10 ML: at 05:09

## 2022-09-29 RX ADMIN — PANTOPRAZOLE SODIUM 40 MG: 40 INJECTION, POWDER, FOR SOLUTION INTRAVENOUS at 08:09

## 2022-09-29 RX ADMIN — ENOXAPARIN SODIUM 40 MG: 40 INJECTION SUBCUTANEOUS at 08:09

## 2022-09-29 NOTE — PT/OT/SLP DISCHARGE
2022  Physical Therapy Discharge Summary    Name: Linda Lorenz  MRN: [id number]   Principal Problem: Hypoxia     Patient Discharged from acute Physical Therapy on 2022  .  Please refer to prior PT note for functional status.  Post discharge documentation.   Documenting PT did not evaluate / treat patient but evaluating PT not available to complete discharge summary.        Assessment:     Patient was discharged unexpectedly.  Information required to complete an accurate discharge summary is unknown.  Refer to therapy initial evaluation and last progress note for initial and most recent functional status and goal achievement.  Recommendations made may be found in medical record.    Objective:     GOALS:   Multidisciplinary Problems       Physical Therapy Goals          Problem: Physical Therapy    Goal Priority Disciplines Outcome Goal Variances Interventions   Physical Therapy Goal     PT, PT/OT      Description: Goals to be met by: Discharge    Pt. Will increase functional mobility by performin. Bed mobility with MOD I.  2. Sit<>stand with SPV  3. Pt. Will ambulate x 100 feet with RW and SBA.  GOALS WERE PARTIALLY MET                       Reasons for Discontinuation of Therapy Services  DC from the hospital.       Plan:     Patient Discharged to:  unknown .      2022

## 2022-09-29 NOTE — NURSING
Pt able to stand and transfer into wc without difficulty or SOB. O2 remains at 2L/NC while being propelled to her vehicle via wc with her  at her side. Once O2 removed and pt transferred out of  and safely into vehicle, no SOB noted. Midline removed prior to discharge.

## 2022-09-29 NOTE — PLAN OF CARE
Problem: Adult Inpatient Plan of Care  Goal: Plan of Care Review  9/28/2022 1933 by Rosemary Killian RN  Outcome: Ongoing, Progressing  9/28/2022 1932 by Rosmeary Killian RN  Outcome: Ongoing, Progressing  Goal: Patient-Specific Goal (Individualized)  9/28/2022 1933 by Rosemary Killian RN  Outcome: Ongoing, Progressing  9/28/2022 1932 by Rosemary Killian RN  Outcome: Ongoing, Progressing  Goal: Absence of Hospital-Acquired Illness or Injury  9/28/2022 1933 by Rosemary Killian RN  Outcome: Ongoing, Progressing  9/28/2022 1932 by Rosemary Killian RN  Outcome: Ongoing, Progressing  Goal: Optimal Comfort and Wellbeing  9/28/2022 1933 by Rosemary Killian RN  Outcome: Ongoing, Progressing  9/28/2022 1932 by Rosemary Killian RN  Outcome: Ongoing, Progressing  Goal: Readiness for Transition of Care  9/28/2022 1933 by Rosemary Killian RN  Outcome: Ongoing, Progressing  9/28/2022 1932 by Rosemary Killian RN  Outcome: Ongoing, Progressing     Problem: Bariatric Environmental Safety  Goal: Safety Maintained with Care  9/28/2022 1933 by Rosemary Killian RN  Outcome: Ongoing, Progressing  9/28/2022 1932 by Rosemary Killian RN  Outcome: Ongoing, Progressing     Problem: Skin Injury Risk Increased  Goal: Skin Health and Integrity  9/28/2022 1933 by Rosemary Killian RN  Outcome: Ongoing, Progressing  9/28/2022 1932 by Rosemary Killian RN  Outcome: Ongoing, Progressing     Problem: Fall Injury Risk  Goal: Absence of Fall and Fall-Related Injury  9/28/2022 1933 by Rosemary Killian RN  Outcome: Ongoing, Progressing  9/28/2022 1932 by Rosemary Killian RN  Outcome: Ongoing, Progressing     Problem: Infection  Goal: Absence of Infection Signs and Symptoms  9/28/2022 1933 by Rosemary Killian RN  Outcome: Ongoing, Progressing  9/28/2022 1932 by Rosemary Killian RN  Outcome: Ongoing, Progressing

## 2022-09-29 NOTE — PLAN OF CARE
Received call from nurse Cardona' about rollator. Looked in orders and saw that Dr Hernández put order in computer. Sent to [facility] and requested delivery ASAP. Will follow.    Received call from Jose with [facility] stating that pt's insurance does not cover bariatric rollators and OOP cost is $270. Notified nurse Dulce' and she will speak with pt and get back with me.    Pt stated she cannot afford OOP cost. Notified Jose with [facility].

## 2022-09-29 NOTE — PT/OT/SLP PROGRESS
Occupational Therapy   Treatment/D/C note    Name: Linda Lorenz  MRN: [id number]  Admitting Diagnosis:  Hypoxia         Recommendations:     Discharge Recommendations: home with home health, home health OT, other (see comments) (HOME WITH RESPONSIBLE CAREGIVER)  Discharge Equipment Recommendations:  walker, rolling  Barriers to discharge:  Other (Comment) (skill level of assist at this time)    Assessment:     Linda Lorenz is a 46 y.o. female with a medical diagnosis of Hypoxia.  She presents with functional decline . Performance deficits affecting function are weakness, impaired endurance, impaired self care skills, impaired functional mobility, gait instability, impaired balance, decreased upper extremity function, pain. Pt being d/c'd from acute care after OT session as nurse giving d/c instruction. Recommend cont OT services via  OT     Rehab Prognosis:  Good; patient would benefit from acute skilled OT services to address these deficits and reach maximum level of function.       Plan:     Patient to be seen  (pt d/c'd after OT session) to address the above listed problems via  (pt d/c'd after OT session; recommend  OT)  Plan of Care Expires:    Plan of Care Reviewed with: patient    Subjective     Pain/Comfort:  Pain Rating 1: 0/10  Location - Side 1: Right  Location 1: rib(s)  Pain Addressed 1: Reposition, Cessation of Activity  Pain Rating Post-Intervention 1: 5/10    Objective:     Communicated with: nurse Cardona  prior to session.  Patient found up in chair with telemetry, PICC line upon OT entry to room.    General Precautions: Standard, fall   Orthopedic Precautions:N/A   Braces: N/A  Respiratory Status: Nasal cannula, flow 2 L/min     Occupational Performance:     Functional Mobility/Transfers:  Patient completed Sit <> Stand Transfer with supervision  with  no assistive device   Functional Mobility: Pt with increased R flank pain/ribs with mobility and SOB; no RW present in  room and pt required  supervision sit to stand then CGA while holding on to bedside chair to take 3 steps to lavatory and back to chair    Activities of Daily Living:  Grooming: supervision standing for oral care/brush teeth and wash hands then d/t decreased endurance and increased pain, pt had to sit for making ponytail and washing face with washcloth  Bathing: stand by assistance UB and mod A LB for wipe off from bedside chair  Lower Body Dressing: supervision don shorts and total A don socks and shoes ; increased SOB and rest required after donning shorts  UB dressing: s/u bra and pullover      Treatment & Education:  Pt seen for education on GE ROM exercises R UE as tolerated and pt able to demonstrate; Pt seen for ADL and mobiltiy training - see above; nurse notified RW not covered by insurance and 270$; pt reported that she cannot afford at this time; pt has very old SW at home from her mother; Pt reported that SW is bent on one side but she was utilizing at home; recommend HH to eval safety of RW and possibly order wheels for it which would be much cheaper for pt; pt with good understanding and will consult with  therapist ; Pt will need to use w/c to get to bathroom until walker is evaluated by HH     Patient left up in chair with all lines intact, call button in reach, and Nurse Dulce  present    GOALS:   Multidisciplinary Problems       Occupational Therapy Goals          Problem: Occupational Therapy    Goal Priority Disciplines Outcome Interventions   Occupational Therapy Goal     OT, PT/OT  Partially met goals     Description: Goals to be met by: d/c     Patient will increase functional independence with ADLs by performing:    LE Dressing with Supervision for shorts only-GOAL MET  Grooming while standing at sink with Supervision-PARTIALLY MET AS POOR STAND TOLERANCE.  Toilet transfer to bedside commode with Supervision-GOAL NOT MET.  Pt education on HEP for R UE with Lynch performing ROM ex  as tolerated. - GOAL MET                           Time Tracking:     OT Date of Treatment: 09/29/22  OT Start Time: 1126  OT Stop Time: 1151  OT Total Time (min): 25 min    Billable Minutes:Self Care/Home Management 25 min    OT/TONY: OT          9/29/2022

## 2022-09-29 NOTE — PROGRESS NOTES
[facility] Family Medicine Progress Note      Subjective:   Brief HPI:  Linda Lorenz is a 46 y.o. female with Hx of shortness of breath hypertension, CVA (07/2021), EMMA, bipolar, anxiety who presented to [facility] ED on 9/26/2022 with complaints of worsening shortness of breath x1 week and lower extremity edema x2 days.  In addition she reports some presyncopal episode, weakness and lightheadedness.  She denies any syncope, recent URI, sick contacts, recent travel, nausea, vomiting, fever, palpitations or syncopal episodes.  She does endorse some generalized weakness, pleuritic chest pain,  spinning of the room (is chronic and dizziness.  She states that she has a sleep study that has been completed but she supposed to actually get the CPAP machine in about a month.  In the ED she was satting 93% on room air and desatted in the 60s in her sleep.  She states that she used to be on the CPAP but due to insurance reason she had to return the machine.  On physical exam patient demonstrates bilateral lower extremity edema, no JVD and otherwise unremarkable physical exam.  Internal Medicine was consulted on the case for hypoxia, shortness of breath secondary to bilateral pleural effusions in rule out possible PE     In the ED: BNP <10, troponin negative, sodium 142, potassium 3.8, BUN 25.3/creatinine 0.92 ,glucose 105, WBC 6.9, H/H 11.6/39.1, platelet 247  Chest x-ray: showed trace bilateral pleural effusions     Interval History:  NAOE.  I was able to speak to the patient in private regarding her claim of coughing a break-in 6 ribs.  She denies any domestic violence and feels safe in her home.  Ambulatory sats yesterday were 99/100%.  Case management was consulted for home oxygen.  Patient will require outpatient does Ozympic to assist with weight loss efforts.  Case management has set the referral for stat home health and was accepted.  We are currently waiting for home O2 to be set up for this patient and then she can  be discharged today.  Patient will need to obtain her sleep study prior to be able to get a CPAP machine.  We are unable to supply her with a CPAP machine until this test is performed.    Was given 90 mEq K yesterday. Given 4 doses of PRN benzonate for cough and Norco 7.5/325mg for back pain.     ROS:  As per HPI    Objective:   Vitals:  Temp: 98.1 °F (36.7 °C) (09/29 0412)  Pulse: 101 (09/29 0528)  Resp: 18 (09/29 0412)  BP: 105/67 (09/29 0412)  SpO2: 91 % (09/29 0412)    Physical Examination:  Constitutional: NAD  Head: Normocephalic, atraumatic  Eyes: PEERLA, EOMI, Anicteric conjunctiva  ENT: No lymphadenopathy, No thyromegaly  Lungs: CTAB, No crackles, No wheezes  Cardiovascular: Normal heart sounds, No MRG, No JVD  Abdomen: Soft, Nontender, No palpable hepatosplenomegaly, No abdominal masses, No ascites  Extremities: No cyanosis, No clubbing, +2 edema cielo  Neuro: Alert and oriented to person, place, and time, Reflexes and strength are normal  Skin: Warm, dry, no rashes    Laboratory:    Lab Results   Component Value Date    WBC 5.3 09/29/2022    HGB 10.2 (L) 09/29/2022    HCT 34.0 (L) 09/29/2022    MCV 96.0 (H) 09/29/2022     09/29/2022         BMP  Lab Results   Component Value Date     09/29/2022    K 3.6 09/29/2022    CO2 34 (H) 09/29/2022    BUN 14.2 09/29/2022    CREATININE 0.67 09/29/2022    CALCIUM 8.9 09/29/2022    EGFRNONAA >60 06/22/2022       ABG  No results for input(s): PH, PO2, PCO2, HCO3, BE in the last 168 hours.    Radiology:  X-Ray Chest PA And Lateral    Result Date: 9/26/2022  Trace bilateral pleural effusions. Electronically signed by: Alejandra Gauthier Date:    09/26/2022 Time:    13:04    CTA Chest Non-Coronary (PE Studies)    Result Date: 9/26/2022  Limited evaluation secondary to phase of contrast. There is no large or central thrombus identified. Acute to subacute fractures of the right 7th 8th, 9th, and 10th visualize ribs. Electronically signed by: Rakesh Chavez  Date:    09/26/2022 Time:    20:40       Assessment & Plan:   Hypoxia  Oxygen desaturations  Rule out PE (wells criteria equal 3 moderate risk)  -Upon admit having oxygen desaturations, pleuritic chest pain  -order CTA PE rule out PE  -chest x-ray:  Showed bilateral pleural effusions  -increased Lasix to Lasix 40 mg BID (BUN 25.3/creatinine 0.9 )  -CPAP at night (has history of CPAP use) was unable to tolerate  -consult to Case Management to see we can get a nasal mask for her  -order Tessalon Perles for the cough prn  -ordered Norco 7.5 mg/325 mg 1 tab every 6 hours p.r.n. pain for rib pain with coughing  -ABG on room air with PaO2 at 47.  Consult to case management for home O2  -She has been accepted for home health services.  -Discharge on Ozympic for weight loss.  -Patient needs to complete her sleep study prior to be able to obtain her CPAP machine.  -Cnsult ordered case management for home O2.       Dizziness  Presyncopal episode  -Bilateral pleural effusions on chest x-ray  -physical exam with bilateral lower extremity edema +2 to the knees  -Output 1500 cc with lasix 40 mg, ordered for this am   -Echo with bubble study this a.m. to evaluate a cardiac function  -EKG normal sinus rhythm  -neuro checks q.2   -WNL TSH, free T4, A1c     HTN  -Continue home losartan 100 mg daily  -CMP, CBC, Mg, Phos in am . Keep K>4, Phos>3, Mg>2      Hx bipolar  History anxiety  -Continue  home Quetiapine 300 mg q.h.s., bupropion 150 mg bela  -ordered buspirone 10 mg b.i.d. for her anxiety      History anemia  -Continue home folic acid 1 mg daily    Code Status:  Full code  Consults:  None  Lines: PIV  Drains: none  Analgesia/Sedation:  None  Antibiotics:   Diet: NPO  Fluids:  LR at 100  Drips:  None  Oxygenation: Oxymask 2 L/min  DVT Prophylaxis: SCDs  GI Prophylaxis: Protonix 40 mg daily IV      Disposition:    Will diurese.  Continue to monitor electrolytes, maty Bonilla MD  [employer] [facility] PGY2

## 2022-10-01 NOTE — DISCHARGE SUMMARY
[facility] Internal [facility] Discharge Summary    Admitting Physician: Raul Samayoa MD  Attending Physician: No att. providers found  Date of Admit: 9/26/2022  Date of Discharge: 10/1/2022    Condition: Good  Outcome: Condition has improved and patient is now ready for discharge.  DISPOSITION: Home or Self Care          Discharge Diagnoses     Patient Active Problem List   Diagnosis    Hypoxia       Principal Problem:  Hypoxia    Consultants and Procedures     Consultants:  IP CONSULT TO SOCIAL WORK/CASE MANAGEMENT      Brief Admission History      Linda Lorenz is a 46 y.o. female with Hx of shortness of breath hypertension, CVA (07/2021), EMMA, bipolar, anxiety who presented to [facility] ED on 9/26/2022 with complaints of worsening shortness of breath x1 week and lower extremity edema x2 days.  In addition she reports some presyncopal episode, weakness and lightheadedness.  She denies any syncope, recent URI, sick contacts, recent travel, nausea, vomiting, fever, palpitations or syncopal episodes.  She does endorse some generalized weakness, pleuritic chest pain,  spinning of the room (is chronic and dizziness.  She states that she has a sleep study that has been completed but she supposed to actually get the CPAP machine in about a month.  In the ED she was satting 93% on room air and desatted in the 60s in her sleep.  She states that she used to be on the CPAP but due to insurance reason she had to return the machine.  On physical exam patient demonstrates bilateral lower extremity edema, no JVD and otherwise unremarkable physical exam.  Internal Medicine was consulted on the case for hypoxia, shortness of breath secondary to bilateral pleural effusions in rule out possible PE     In the ED: BNP <10, troponin negative, sodium 142, potassium 3.8, BUN 25.3/creatinine 0.92 ,glucose 105, WBC 6.9, H/H 11.6/39.1, platelet 247  EKG: normal sinus rhythm   Chest x-ray: showed trace bilateral pleural effusions.  "  Maybe hypoventilation.     Hospital Course with Pertinent Findings     Diuresed inpatient w/ Lasix 40mg AM daily which increased to BID over hospital course, monitor electrolytes and repleted as needed. Day after admission, pt found to have broken ribs, she claims were caused by coughing too hard. CTA PE was ordered to rule out PE and echo w/ bubble study to evaluate cardiac function. Was receiving  cc/hr. Was placed Ambulatory O2 saturations were 99%/100%. Did not tolerate CPAP, was also not continued due to lack of insurance overage. Ordered Tessalon PRN for cough, norco 7.5/325 for pain relief, and added Buspirone 10mg BID. Recommended Ozympic to assist with weight loss efforts. Referral for home health was approved.     Discharge physical exam:  Vitals  BP: 111/72  Temp: 97.6 °F (36.4 °C)  Temp src: Oral  Pulse: 105  Resp: 16  SpO2: 99 %  Height: 5' 4" (162.6 cm)  Weight: (!) 181.4 kg (400 lb)    IR for     TIME SPENT ON DISCHARGE: 60 minutes.     Discharge Medications        Medication List        START taking these medications      benzonatate 100 MG capsule  Commonly known as: TESSALON  Take 1 capsule (100 mg total) by mouth 3 (three) times daily as needed for Cough.     losartan 100 MG tablet  Commonly known as: COZAAR  Take 1 tablet (100 mg total) by mouth once daily.     OZEMPIC 0.25 mg or 0.5 mg(2 mg/1.5 mL) pen injector  Generic drug: semaglutide  Inject 0.25 mg into the skin every 7 days.               Where to Get Your Medications        [facility] [address] [place] [facility] [address]  [address] [address]      Phone: [phone]   benzonatate 100 MG capsule  losartan 100 MG tablet  OZEMPIC 0.25 mg or 0.5 mg(2 mg/1.5 mL) pen injector         Discharge Information:     Was approved for home health, no need for supplemental O2 at SNF or LTAC at this time. Counseled on weight loss and the role Ozempic has. Patient will need " to obtain sleep study in an outpatient setting before being evaluated for CPAP machine. Be vigilant on incoming patients while     Clinic scheduler will call you to schedule post-menjivar clinic time, 10/5/22.         Gaby Bonilla MD  [facility] Family Medicine HO-1

## 2022-10-20 ENCOUNTER — DOCUMENT SCAN (OUTPATIENT)
Dept: HOME HEALTH SERVICES | Facility: HOSPITAL | Age: 47
End: 2022-10-20
Payer: MEDICAID

## 2022-10-20 ENCOUNTER — EXTERNAL HOME HEALTH (OUTPATIENT)
Dept: HOME HEALTH SERVICES | Facility: HOSPITAL | Age: 47
End: 2022-10-20
Payer: MEDICAID

## 2022-10-27 ENCOUNTER — HOSPITAL ENCOUNTER (EMERGENCY)
Facility: HOSPITAL | Age: 47
Discharge: HOME | End: 2022-10-27
Attending: EMERGENCY MEDICINE
Payer: MEDICAID

## 2022-10-27 VITALS
HEART RATE: 86 BPM | TEMPERATURE: 98 F | BODY MASS INDEX: 50.02 KG/M2 | RESPIRATION RATE: 22 BRPM | DIASTOLIC BLOOD PRESSURE: 86 MMHG | HEIGHT: 64 IN | OXYGEN SATURATION: 96 % | SYSTOLIC BLOOD PRESSURE: 137 MMHG | WEIGHT: 293 LBS

## 2022-10-27 DIAGNOSIS — M54.9 BACK PAIN: ICD-10-CM

## 2022-10-27 DIAGNOSIS — R05.9 COUGH, UNSPECIFIED TYPE: ICD-10-CM

## 2022-10-27 DIAGNOSIS — M54.9 BACK PAIN, UNSPECIFIED BACK LOCATION, UNSPECIFIED BACK PAIN LATERALITY, UNSPECIFIED CHRONICITY: Primary | ICD-10-CM

## 2022-10-27 PROCEDURE — 96372 THER/PROPH/DIAG INJ SC/IM: CPT | Performed by: NURSE PRACTITIONER

## 2022-10-27 PROCEDURE — 99284 EMERGENCY DEPT VISIT MOD MDM: CPT | Mod: 25

## 2022-10-27 PROCEDURE — 63600175 PHARM REV CODE 636 W HCPCS: Performed by: NURSE PRACTITIONER

## 2022-10-27 RX ORDER — KETOROLAC TROMETHAMINE 30 MG/ML
60 INJECTION, SOLUTION INTRAMUSCULAR; INTRAVENOUS ONCE
Status: COMPLETED | OUTPATIENT
Start: 2022-10-27 | End: 2022-10-27

## 2022-10-27 RX ORDER — AZITHROMYCIN 250 MG/1
250 TABLET, FILM COATED ORAL DAILY
Qty: 6 TABLET | Refills: 0 | Status: ON HOLD | OUTPATIENT
Start: 2022-10-27 | End: 2022-11-01 | Stop reason: HOSPADM

## 2022-10-27 RX ADMIN — KETOROLAC TROMETHAMINE 60 MG: 30 INJECTION, SOLUTION INTRAMUSCULAR at 05:10

## 2022-10-27 NOTE — ED PROVIDER NOTES
Encounter Date: 10/27/2022       History     Chief Complaint   Patient presents with    Back Pain     C/o pain to upper back after trying to get into bed     Patient is a 46-year-old  female with high BMI who presents to the emergency department with complaints of mid to right back pain.  She states this pain started.  While standing up trying to get in the bed.  She states September she had some broken ribs due to a cough and these were improving but not the pain started worse back in the last few days.  She denies any specific injury or trauma.  She does report a cough for a week which she is taking test line Perles for.  She denies any fevers chills.  She states cough is mainly dry nonproductive.  She is currently in a wheelchair which is mainly her assistive device at home.  Patient has no other complaints at this ED visit.    Review of patient's allergies indicates:   Allergen Reactions    Penicillins      Past Medical History:   Diagnosis Date    Anxiety disorder, unspecified     Benign paroxysmal vertigo, bilateral     Bipolar disorder, unspecified     CVA (cerebral vascular accident)     Depression     GERD (gastroesophageal reflux disease)     Hypertension     Neuropathy      Past Surgical History:   Procedure Laterality Date     SECTION      CHOLECYSTECTOMY      TUBAL LIGATION       No family history on file.  Social History     Tobacco Use    Smoking status: Former     Types: Cigarettes    Smokeless tobacco: Never   Substance Use Topics    Alcohol use: Not Currently    Drug use: Never     Review of Systems   Constitutional:  Negative for activity change, appetite change, chills and fever.   HENT:  Negative for congestion, dental problem, sore throat and voice change.    Eyes:  Negative for pain, discharge and itching.   Respiratory:  Negative for shortness of breath.    Cardiovascular:  Negative for chest pain.   Gastrointestinal:  Negative for abdominal distention, abdominal pain and  nausea.   Endocrine: Negative for cold intolerance and heat intolerance.   Genitourinary:  Negative for difficulty urinating, dysuria, vaginal discharge and vaginal pain.   Musculoskeletal:  Positive for back pain and gait problem. Negative for arthralgias and myalgias.        Gait problem is chronic   Skin:  Negative for rash.   Neurological:  Negative for dizziness, facial asymmetry and weakness.   Hematological:  Does not bruise/bleed easily.   Psychiatric/Behavioral:  Negative for agitation and behavioral problems.    All other systems reviewed and are negative.    Physical Exam     Initial Vitals [10/27/22 1650]   BP Pulse Resp Temp SpO2   (!) 140/72 86 (!) 22 97.7 °F (36.5 °C) 96 %      MAP       --         Physical Exam    Nursing note and vitals reviewed.  Constitutional: Vital signs are normal. She appears well-developed and well-nourished.  Non-toxic appearance. She does not have a sickly appearance.   HENT:   Head: Normocephalic and atraumatic.   Eyes: Conjunctivae, EOM and lids are normal. Pupils are equal, round, and reactive to light. Lids are everted and swept, no foreign bodies found.   Neck: Trachea normal and phonation normal. Neck supple. No thyroid mass and no thyromegaly present.   Normal range of motion.   Full passive range of motion without pain.     Cardiovascular:  Normal rate, regular rhythm, S1 normal, S2 normal, normal heart sounds, intact distal pulses and normal pulses.           Pulmonary/Chest: Breath sounds normal.   Abdominal: Abdomen is soft.   Musculoskeletal:      Cervical back: Full passive range of motion without pain, normal range of motion and neck supple.     Lymphadenopathy:     She has no cervical adenopathy.   Neurological: She is alert and oriented to person, place, and time. She has normal strength and normal reflexes. GCS score is 15. GCS eye subscore is 4. GCS verbal subscore is 5. GCS motor subscore is 6.   Skin: Skin is warm, dry and intact. Capillary refill takes  less than 2 seconds.   Psychiatric: She has a normal mood and affect. Her speech is normal and behavior is normal. Judgment normal. Cognition and memory are normal.       ED Course   Procedures  Labs Reviewed - No data to display       Imaging Results              X-Ray Thoracic Spine AP And Lateral (Preliminary result)  Result time 10/27/22 18:03:14      ED Interpretation by DERRELL Ragland (10/27/22 18:03:14, [facility] - Emergency Dept, Emergency Medicine)    Wet read:  No major abnormality seen on plain film views of the thoracic spine or the rib views.  Patient has diagnosis rib fractures in the last 2 months.  No major changes here.  Pending radiologist's review.                                     XR Ribs Min 3 Views w/PA Chest Right (In process)  Result time 10/27/22 17:19:37                     Medications   ketorolac injection 60 mg (60 mg Intramuscular Given 10/27/22 1701)     Medical Decision Making:   Initial Assessment:   I was informed by nursing staff ever having done assessment on this patient that she is now reporting that she had already been seen for this pain at another facility and apparently had x-rays that and was given a prescription but she is here because she feels like something else is going on.  I will do x-rays again because we are not going to be or to get those records in a timely fashion here and developed further plan of care once we is obtained the plain film x-rays.                        Clinical Impression:   Final diagnoses:  [M54.9] Back pain  [M54.9] Back pain, unspecified back location, unspecified back pain laterality, unspecified chronicity (Primary)        ED Disposition Condition    Discharge Stable          ED Prescriptions    None       Follow-up Information       Follow up With Specialties Details Why Contact Info    Jenaro Alvarado MD Family Medicine Call in 1 week As needed, For ER Follow Up. [address] [place] [address]  [phone]                Demond Grimm, [facility]  10/27/22 1801

## 2022-10-30 ENCOUNTER — HOSPITAL ENCOUNTER (INPATIENT)
Facility: HOSPITAL | Age: 47
LOS: 2 days | Discharge: HOME | DRG: 189 | End: 2022-11-01
Attending: STUDENT IN AN ORGANIZED HEALTH CARE EDUCATION/TRAINING PROGRAM | Admitting: INTERNAL MEDICINE
Payer: MEDICAID

## 2022-10-30 DIAGNOSIS — R42 DIZZINESS: ICD-10-CM

## 2022-10-30 DIAGNOSIS — E66.2 OBESITY HYPOVENTILATION SYNDROME: Primary | ICD-10-CM

## 2022-10-30 DIAGNOSIS — R55 SYNCOPE, UNSPECIFIED SYNCOPE TYPE: ICD-10-CM

## 2022-10-30 DIAGNOSIS — R55 SYNCOPE AND COLLAPSE: ICD-10-CM

## 2022-10-30 DIAGNOSIS — R09.02 HYPOXIA: ICD-10-CM
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
## 2022-10-30 DIAGNOSIS — S70.01XA CONTUSION OF RIGHT HIP, INITIAL ENCOUNTER: ICD-10-CM

## 2022-10-30 DIAGNOSIS — R55 SYNCOPE: ICD-10-CM

## 2022-10-30 DIAGNOSIS — J96.90 RESPIRATORY FAILURE, UNSPECIFIED CHRONICITY, UNSPECIFIED WHETHER WITH HYPOXIA OR HYPERCAPNIA: ICD-10-CM

## 2022-10-30 DIAGNOSIS — G89.29 OTHER CHRONIC PAIN: ICD-10-CM

## 2022-10-30 LAB
ALBUMIN SERPL-MCNC: 3.6 GM/DL (ref 3.5–5)
ALBUMIN/GLOB SERPL: 1.2 RATIO (ref 1.1–2)
ALP SERPL-CCNC: 82 UNIT/L (ref 40–150)
ALT SERPL-CCNC: 40 UNIT/L (ref 0–55)
AST SERPL-CCNC: 19 UNIT/L (ref 5–34)
BASOPHILS # BLD AUTO: 0.03 X10(3)/MCL (ref 0–0.2)
BASOPHILS NFR BLD AUTO: 0.5 %
BILIRUBIN DIRECT+TOT PNL SERPL-MCNC: 0.3 MG/DL
BUN SERPL-MCNC: 16.5 MG/DL (ref 7–18.7)
CALCIUM SERPL-MCNC: 8.8 MG/DL (ref 8.4–10.2)
CHLORIDE SERPL-SCNC: 99 MMOL/L (ref 98–107)
CO2 SERPL-SCNC: 34 MMOL/L (ref 22–29)
CORRECTED TEMPERATURE (PCO2): 81 MMHG (ref 19–50)
CORRECTED TEMPERATURE (PH): 7.33 (ref 7.35–7.45)
CORRECTED TEMPERATURE (PO2): 115 MMHG (ref 80–100)
CREAT SERPL-MCNC: 0.87 MG/DL (ref 0.55–1.02)
EOSINOPHIL # BLD AUTO: 0.2 X10(3)/MCL (ref 0–0.9)
EOSINOPHIL NFR BLD AUTO: 3.2 %
ERYTHROCYTE [DISTWIDTH] IN BLOOD BY AUTOMATED COUNT: 14.9 % (ref 11.5–17)
GFR SERPLBLD CREATININE-BSD FMLA CKD-EPI: >60 MLS/MIN/1.73/M2
GLOBULIN SER-MCNC: 3.1 GM/DL (ref 2.4–3.5)
GLUCOSE SERPL-MCNC: 135 MG/DL (ref 74–100)
HCO3 UR-SCNC: 42.7 MMOL/L (ref 22–26)
HCT VFR BLD AUTO: 37.3 % (ref 37–47)
HGB BLD-MCNC: 11.2 GM/DL (ref 12–16)
HGB BLD-MCNC: 11.6 G/DL (ref 12–16)
IMM GRANULOCYTES # BLD AUTO: 0.02 X10(3)/MCL (ref 0–0.04)
IMM GRANULOCYTES NFR BLD AUTO: 0.3 %
LYMPHOCYTES # BLD AUTO: 1.64 X10(3)/MCL (ref 0.6–4.6)
LYMPHOCYTES NFR BLD AUTO: 26 %
MCH RBC QN AUTO: 28.4 PG (ref 27–31)
MCHC RBC AUTO-ENTMCNC: 30 MG/DL (ref 33–36)
MCV RBC AUTO: 94.7 FL (ref 80–94)
MONOCYTES # BLD AUTO: 0.55 X10(3)/MCL (ref 0.1–1.3)
MONOCYTES NFR BLD AUTO: 8.7 %
NEUTROPHILS # BLD AUTO: 3.9 X10(3)/MCL (ref 2.1–9.2)
NEUTROPHILS NFR BLD AUTO: 61.3 %
NRBC BLD AUTO-RTO: 0 %
PCO2 BLDA: 81 MMHG (ref 19–50)
PH SMN: 7.33 [PH] (ref 7.35–7.45)
PLATELET # BLD AUTO: 253 X10(3)/MCL (ref 130–400)
PMV BLD AUTO: 10.5 FL (ref 7.4–10.4)
PO2 BLDA: 115 MMHG (ref 80–100)
POC BASE DEFICIT: 13.6 MMOL/L (ref -2–2)
POC COHB: 1.4 %
POC IONIZED CALCIUM: 1.14 MMOL/L (ref 1.12–1.23)
POC METHB: 0.2 % (ref 0.4–1.5)
POC O2HB: 96.7 % (ref 94–97)
POC SATURATED O2: 98.2 %
POC TEMPERATURE: 37 °C
POCT GLUCOSE: 107 MG/DL (ref 70–110)
POCT GLUCOSE: 147 MG/DL (ref 70–110)
POCT GLUCOSE: 98 MG/DL (ref 70–110)
POTASSIUM BLD-SCNC: 3.8 MMOL/L (ref 3.5–5)
POTASSIUM SERPL-SCNC: 4 MMOL/L (ref 3.5–5.1)
PROT SERPL-MCNC: 6.7 GM/DL (ref 6.4–8.3)
RBC # BLD AUTO: 3.94 X10(6)/MCL (ref 4.2–5.4)
SARS-COV-2 RDRP RESP QL NAA+PROBE: NEGATIVE
SODIUM BLD-SCNC: 138 MMOL/L (ref 137–145)
SODIUM SERPL-SCNC: 142 MMOL/L (ref 136–145)
SPECIMEN SOURCE: ABNORMAL
TROPONIN I SERPL-MCNC: <0.01 NG/ML (ref 0–0.04)
WBC # SPEC AUTO: 6.3 X10(3)/MCL (ref 4.5–11.5)

## 2022-10-30 PROCEDURE — 87635 SARS-COV-2 COVID-19 AMP PRB: CPT | Performed by: STUDENT IN AN ORGANIZED HEALTH CARE EDUCATION/TRAINING PROGRAM

## 2022-10-30 PROCEDURE — 63600175 PHARM REV CODE 636 W HCPCS: Performed by: STUDENT IN AN ORGANIZED HEALTH CARE EDUCATION/TRAINING PROGRAM

## 2022-10-30 PROCEDURE — 93010 EKG 12-LEAD: ICD-10-PCS | Mod: ,,, | Performed by: INTERNAL MEDICINE

## 2022-10-30 PROCEDURE — 84484 ASSAY OF TROPONIN QUANT: CPT | Performed by: STUDENT IN AN ORGANIZED HEALTH CARE EDUCATION/TRAINING PROGRAM

## 2022-10-30 PROCEDURE — 93010 ELECTROCARDIOGRAM REPORT: CPT | Mod: ,,, | Performed by: INTERNAL MEDICINE

## 2022-10-30 PROCEDURE — 36600 WITHDRAWAL OF ARTERIAL BLOOD: CPT

## 2022-10-30 PROCEDURE — 25000003 PHARM REV CODE 250: Performed by: NURSE PRACTITIONER

## 2022-10-30 PROCEDURE — 99285 EMERGENCY DEPT VISIT HI MDM: CPT | Mod: 25

## 2022-10-30 PROCEDURE — 27000190 HC CPAP FULL FACE MASK W/VALVE

## 2022-10-30 PROCEDURE — 82962 GLUCOSE BLOOD TEST: CPT

## 2022-10-30 PROCEDURE — 25000003 PHARM REV CODE 250: Performed by: STUDENT IN AN ORGANIZED HEALTH CARE EDUCATION/TRAINING PROGRAM

## 2022-10-30 PROCEDURE — 27000221 HC OXYGEN, UP TO 24 HOURS

## 2022-10-30 PROCEDURE — 25000003 PHARM REV CODE 250: Performed by: INTERNAL MEDICINE

## 2022-10-30 PROCEDURE — 96372 THER/PROPH/DIAG INJ SC/IM: CPT | Mod: 59 | Performed by: STUDENT IN AN ORGANIZED HEALTH CARE EDUCATION/TRAINING PROGRAM

## 2022-10-30 PROCEDURE — 11000001 HC ACUTE MED/SURG PRIVATE ROOM

## 2022-10-30 PROCEDURE — 25500020 PHARM REV CODE 255: Performed by: INTERNAL MEDICINE

## 2022-10-30 PROCEDURE — 94660 CPAP INITIATION&MGMT: CPT

## 2022-10-30 PROCEDURE — 85025 COMPLETE CBC W/AUTO DIFF WBC: CPT | Performed by: EMERGENCY MEDICINE

## 2022-10-30 PROCEDURE — 36415 COLL VENOUS BLD VENIPUNCTURE: CPT | Performed by: EMERGENCY MEDICINE

## 2022-10-30 PROCEDURE — 93005 ELECTROCARDIOGRAM TRACING: CPT

## 2022-10-30 PROCEDURE — 82803 BLOOD GASES ANY COMBINATION: CPT

## 2022-10-30 PROCEDURE — 80053 COMPREHEN METABOLIC PANEL: CPT | Performed by: EMERGENCY MEDICINE

## 2022-10-30 PROCEDURE — 99900035 HC TECH TIME PER 15 MIN (STAT)

## 2022-10-30 RX ORDER — FOLIC ACID 1 MG/1
TABLET ORAL
COMMUNITY
Start: 2022-10-27

## 2022-10-30 RX ORDER — ACETAMINOPHEN 325 MG/1
650 TABLET ORAL EVERY 8 HOURS PRN
Status: DISCONTINUED | OUTPATIENT
Start: 2022-10-30 | End: 2022-11-01 | Stop reason: HOSPADM

## 2022-10-30 RX ORDER — QUETIAPINE FUMARATE 300 MG/1
TABLET, FILM COATED ORAL
Status: ON HOLD | COMMUNITY
Start: 2022-10-27 | End: 2022-11-01 | Stop reason: HOSPADM

## 2022-10-30 RX ORDER — BUPROPION HYDROCHLORIDE 150 MG/1
150 TABLET, EXTENDED RELEASE ORAL 2 TIMES DAILY
COMMUNITY
Start: 2022-10-11

## 2022-10-30 RX ORDER — LIDOCAINE 50 MG/G
1 PATCH TOPICAL DAILY
Qty: 7 PATCH | Refills: 0 | Status: SHIPPED | OUTPATIENT
Start: 2022-10-30

## 2022-10-30 RX ORDER — PANTOPRAZOLE SODIUM 40 MG/1
TABLET, DELAYED RELEASE ORAL
COMMUNITY
Start: 2022-10-27

## 2022-10-30 RX ORDER — HYDROCODONE BITARTRATE AND ACETAMINOPHEN 5; 325 MG/1; MG/1
1 TABLET ORAL
Status: DISCONTINUED | OUTPATIENT
Start: 2022-10-30 | End: 2022-10-30

## 2022-10-30 RX ORDER — PROPRANOLOL HYDROCHLORIDE 10 MG/1
10 TABLET ORAL 2 TIMES DAILY
Status: DISCONTINUED | OUTPATIENT
Start: 2022-10-30 | End: 2022-11-01 | Stop reason: HOSPADM

## 2022-10-30 RX ORDER — BUPROPION HYDROCHLORIDE 150 MG/1
150 TABLET, EXTENDED RELEASE ORAL 2 TIMES DAILY
Status: DISCONTINUED | OUTPATIENT
Start: 2022-10-30 | End: 2022-11-01 | Stop reason: HOSPADM

## 2022-10-30 RX ORDER — CYCLOBENZAPRINE HCL 10 MG
10 TABLET ORAL 3 TIMES DAILY PRN
Qty: 30 TABLET | Refills: 0 | Status: SHIPPED | OUTPATIENT
Start: 2022-10-30 | End: 2022-10-30 | Stop reason: SDUPTHER

## 2022-10-30 RX ORDER — FUROSEMIDE 40 MG/1
80 TABLET ORAL DAILY
Status: DISCONTINUED | OUTPATIENT
Start: 2022-10-30 | End: 2022-11-01 | Stop reason: HOSPADM

## 2022-10-30 RX ORDER — PANTOPRAZOLE SODIUM 40 MG/1
40 TABLET, DELAYED RELEASE ORAL DAILY
Status: DISCONTINUED | OUTPATIENT
Start: 2022-10-30 | End: 2022-11-01 | Stop reason: HOSPADM

## 2022-10-30 RX ORDER — ACETAMINOPHEN 500 MG
1000 TABLET ORAL EVERY 6 HOURS PRN
Status: DISCONTINUED | OUTPATIENT
Start: 2022-10-30 | End: 2022-11-01 | Stop reason: HOSPADM

## 2022-10-30 RX ORDER — FUROSEMIDE 80 MG/1
TABLET ORAL
COMMUNITY
Start: 2022-10-27

## 2022-10-30 RX ORDER — HYDROCODONE BITARTRATE AND ACETAMINOPHEN 5; 325 MG/1; MG/1
1 TABLET ORAL
Status: COMPLETED | OUTPATIENT
Start: 2022-10-30 | End: 2022-10-30

## 2022-10-30 RX ORDER — GLUCAGON 1 MG
1 KIT INJECTION
Status: DISCONTINUED | OUTPATIENT
Start: 2022-10-30 | End: 2022-11-01 | Stop reason: HOSPADM

## 2022-10-30 RX ORDER — GABAPENTIN 300 MG/1
CAPSULE ORAL
Status: ON HOLD | COMMUNITY
Start: 2022-10-28 | End: 2022-11-01 | Stop reason: HOSPADM

## 2022-10-30 RX ORDER — IBUPROFEN 200 MG
16 TABLET ORAL
Status: DISCONTINUED | OUTPATIENT
Start: 2022-10-30 | End: 2022-11-01 | Stop reason: HOSPADM

## 2022-10-30 RX ORDER — HYDROCODONE BITARTRATE AND ACETAMINOPHEN 5; 325 MG/1; MG/1
TABLET ORAL DAILY PRN
Status: ON HOLD | COMMUNITY
Start: 2022-10-27 | End: 2022-11-01 | Stop reason: HOSPADM

## 2022-10-30 RX ORDER — IBUPROFEN 200 MG
24 TABLET ORAL
Status: DISCONTINUED | OUTPATIENT
Start: 2022-10-30 | End: 2022-11-01 | Stop reason: HOSPADM

## 2022-10-30 RX ORDER — BENZONATATE 200 MG/1
200 CAPSULE ORAL 3 TIMES DAILY PRN
Status: ON HOLD | COMMUNITY
Start: 2022-07-25 | End: 2022-11-01 | Stop reason: HOSPADM

## 2022-10-30 RX ORDER — BUSPIRONE HYDROCHLORIDE 10 MG/1
10 TABLET ORAL DAILY
COMMUNITY
Start: 2022-10-11

## 2022-10-30 RX ORDER — LIDOCAINE 50 MG/G
1 PATCH TOPICAL DAILY
Qty: 7 PATCH | Refills: 0 | Status: SHIPPED | OUTPATIENT
Start: 2022-10-30 | End: 2022-10-30 | Stop reason: SDUPTHER

## 2022-10-30 RX ORDER — PROPRANOLOL HYDROCHLORIDE 10 MG/1
15 TABLET ORAL 2 TIMES DAILY
Status: ON HOLD | COMMUNITY
Start: 2022-10-27 | End: 2022-11-01 | Stop reason: HOSPADM

## 2022-10-30 RX ORDER — ORPHENADRINE CITRATE 30 MG/ML
60 INJECTION INTRAMUSCULAR; INTRAVENOUS
Status: COMPLETED | OUTPATIENT
Start: 2022-10-30 | End: 2022-10-30

## 2022-10-30 RX ORDER — ACETAMINOPHEN 325 MG/1
650 TABLET ORAL EVERY 4 HOURS PRN
Status: DISCONTINUED | OUTPATIENT
Start: 2022-10-30 | End: 2022-10-30

## 2022-10-30 RX ORDER — FLUTICASONE FUROATE AND VILANTEROL TRIFENATATE 100; 25 UG/1; UG/1
1 POWDER RESPIRATORY (INHALATION) DAILY
COMMUNITY
Start: 2022-10-28

## 2022-10-30 RX ORDER — MECLIZINE HYDROCHLORIDE 25 MG/1
TABLET ORAL
Status: ON HOLD | COMMUNITY
Start: 2022-10-27 | End: 2022-11-01 | Stop reason: HOSPADM

## 2022-10-30 RX ORDER — CYCLOBENZAPRINE HCL 10 MG
10 TABLET ORAL 3 TIMES DAILY PRN
Qty: 30 TABLET | Refills: 0 | Status: SHIPPED | OUTPATIENT
Start: 2022-10-30 | End: 2022-11-01 | Stop reason: HOSPADM

## 2022-10-30 RX ORDER — BUSPIRONE HYDROCHLORIDE 5 MG/1
10 TABLET ORAL DAILY
Status: DISCONTINUED | OUTPATIENT
Start: 2022-10-30 | End: 2022-11-01 | Stop reason: HOSPADM

## 2022-10-30 RX ORDER — BACLOFEN 20 MG/1
TABLET ORAL
Status: ON HOLD | COMMUNITY
Start: 2022-10-27 | End: 2022-11-01 | Stop reason: HOSPADM

## 2022-10-30 RX ORDER — POTASSIUM CHLORIDE 750 MG/1
CAPSULE, EXTENDED RELEASE ORAL
COMMUNITY
Start: 2022-10-27

## 2022-10-30 RX ORDER — FLUTICASONE FUROATE AND VILANTEROL 100; 25 UG/1; UG/1
1 POWDER RESPIRATORY (INHALATION) DAILY
Status: DISCONTINUED | OUTPATIENT
Start: 2022-10-30 | End: 2022-11-01 | Stop reason: HOSPADM

## 2022-10-30 RX ORDER — BUSPIRONE HYDROCHLORIDE 15 MG/1
1 TABLET ORAL NIGHTLY
COMMUNITY
Start: 2022-10-27

## 2022-10-30 RX ORDER — PROPRANOLOL HYDROCHLORIDE 10 MG/1
10 TABLET ORAL NIGHTLY
Status: ON HOLD | COMMUNITY
Start: 2022-10-11 | End: 2022-11-01 | Stop reason: HOSPADM

## 2022-10-30 RX ORDER — HYDROXYZINE PAMOATE 25 MG/1
25 CAPSULE ORAL DAILY PRN
Status: ON HOLD | COMMUNITY
Start: 2022-10-11 | End: 2022-11-01 | Stop reason: HOSPADM

## 2022-10-30 RX ORDER — BUSPIRONE HYDROCHLORIDE 5 MG/1
15 TABLET ORAL NIGHTLY
Status: DISCONTINUED | OUTPATIENT
Start: 2022-10-30 | End: 2022-11-01 | Stop reason: HOSPADM

## 2022-10-30 RX ORDER — KETOROLAC TROMETHAMINE 30 MG/ML
60 INJECTION, SOLUTION INTRAMUSCULAR; INTRAVENOUS
Status: DISCONTINUED | OUTPATIENT
Start: 2022-10-30 | End: 2022-10-30

## 2022-10-30 RX ORDER — ONDANSETRON 2 MG/ML
4 INJECTION INTRAMUSCULAR; INTRAVENOUS EVERY 8 HOURS PRN
Status: DISCONTINUED | OUTPATIENT
Start: 2022-10-30 | End: 2022-11-01 | Stop reason: HOSPADM

## 2022-10-30 RX ORDER — LOSARTAN POTASSIUM 50 MG/1
100 TABLET ORAL DAILY
Status: DISCONTINUED | OUTPATIENT
Start: 2022-10-30 | End: 2022-11-01 | Stop reason: HOSPADM

## 2022-10-30 RX ADMIN — PROPRANOLOL HYDROCHLORIDE 10 MG: 10 TABLET ORAL at 08:10

## 2022-10-30 RX ADMIN — BUSPIRONE HYDROCHLORIDE 15 MG: 5 TABLET ORAL at 08:10

## 2022-10-30 RX ADMIN — FUROSEMIDE 80 MG: 40 TABLET ORAL at 02:10

## 2022-10-30 RX ADMIN — HYDROCODONE BITARTRATE AND ACETAMINOPHEN 1 TABLET: 5; 325 TABLET ORAL at 04:10

## 2022-10-30 RX ADMIN — IOPAMIDOL 100 ML: 755 INJECTION, SOLUTION INTRAVENOUS at 12:10

## 2022-10-30 RX ADMIN — BUPROPION HYDROCHLORIDE 150 MG: 150 TABLET, EXTENDED RELEASE ORAL at 08:10

## 2022-10-30 RX ADMIN — ACETAMINOPHEN 1000 MG: 500 TABLET ORAL at 10:10

## 2022-10-30 RX ADMIN — LOSARTAN POTASSIUM 100 MG: 50 TABLET, FILM COATED ORAL at 02:10

## 2022-10-30 RX ADMIN — ORPHENADRINE CITRATE 60 MG: 30 INJECTION INTRAMUSCULAR; INTRAVENOUS at 05:10

## 2022-10-30 RX ADMIN — ACETAMINOPHEN 650 MG: 325 TABLET ORAL at 11:10

## 2022-10-30 RX ADMIN — PANTOPRAZOLE SODIUM 40 MG: 40 TABLET, DELAYED RELEASE ORAL at 02:10

## 2022-10-30 RX ADMIN — BUSPIRONE HYDROCHLORIDE 10 MG: 5 TABLET ORAL at 02:10

## 2022-10-30 NOTE — ED PROVIDER NOTES
"Encounter Date: 10/30/2022    SCRIBE #1 NOTE: I, Rachael Jerez, am scribing for, and in the presence of,  Martin Monsalve IV, MD. I have scribed the following portions of the note - the EKG reading. Other sections scribed: HPI, ROS, PE.     History     Chief Complaint   Patient presents with    Loss of Consciousness    Fall     Has had several episodes of syncopy today (calling them mini blackouts).  She did sustain a fall in the bathroom, complaint of ribs, and right hip pain.   States SP02 has been running low at home in the kala 80's.  SP02 in triage 95%     Mrs. Lorenz is a 46-year-old female with history of EMMA, CVA, GERD, HTN, and neuropathy presenting to ED c/o multiple syncopal episodes today. Pt reports she had "quite a few" episodes, states symptoms "due to hx of stroke." States she was checking her oxygen and saw her heart rate drop. Pt also reports of mid back pain and rib pain due cough induced rib fracture a couple of months ago. Pt was seen again for back pain and had X-rays done without abnormalities. States pain is still present and has been worsening over the past 3-4x days. Pt reports when she turns in bed she can hear "cracking."    Recent admission at [facility], supposed to be discharge with home health and possible home O2? - patient reports this fell through. Supposed to have CPAP but didn't tolerate previous per chart review.     PCP:  Jenaro Alvarado MD     The history is provided by the patient. No  was used.   Neurologic Problem  The primary symptoms include syncope. Primary symptoms do not include fever, nausea or vomiting. The symptoms began today. The symptoms are unchanged.   There was no weakness or nausea. The episode occurred at rest. The syncopal episode did not occur with shortness of breath. There is history of cerebral vascular accident.   Additional symptoms do not include weakness. Medical issues also include cerebral vascular accident and hypertension. "   Review of patient's allergies indicates:   Allergen Reactions    Penicillins      Past Medical History:   Diagnosis Date    Anxiety disorder, unspecified     Benign paroxysmal vertigo, bilateral     Bipolar disorder, unspecified     CVA (cerebral vascular accident)     Depression     GERD (gastroesophageal reflux disease)     Hypertension     Neuropathy      Past Surgical History:   Procedure Laterality Date     SECTION      CHOLECYSTECTOMY      TUBAL LIGATION       No family history on file.  Social History     Tobacco Use    Smoking status: Former     Types: Cigarettes    Smokeless tobacco: Never   Substance Use Topics    Alcohol use: Not Currently    Drug use: Never     Review of Systems   Constitutional:  Negative for chills and fever.   HENT:  Negative for congestion, rhinorrhea and sore throat.    Eyes:  Negative for visual disturbance.   Respiratory:  Negative for cough and shortness of breath.    Cardiovascular:  Positive for syncope. Negative for chest pain and leg swelling.   Gastrointestinal:  Negative for abdominal pain, nausea and vomiting.   Genitourinary:  Negative for dysuria, hematuria, vaginal bleeding and vaginal discharge.   Musculoskeletal:  Positive for back pain. Negative for joint swelling.        Rib pain   Skin:  Negative for rash.   Neurological:  Negative for weakness.   Psychiatric/Behavioral:  Negative for confusion.      Physical Exam     Initial Vitals [10/30/22 0055]   BP Pulse Resp Temp SpO2   139/73 80 (!) 22 97.3 °F (36.3 °C) 95 %      MAP       --         Physical Exam    Nursing note and vitals reviewed.  Constitutional: She is not diaphoretic. No distress.   Morbidly obese female  Snoring loudly when asleep  Easily arousable, alert and oriented when awake    HENT:   Head: Normocephalic and atraumatic.   Eyes: EOM are normal. Pupils are equal, round, and reactive to light.   Neck: Neck supple.   Normal range of motion.  Cardiovascular:  Normal rate and regular rhythm.            No murmur heard.  Pulmonary/Chest: Breath sounds normal. No respiratory distress. She has no wheezes. She has no rales.   Mild Chest wall tenderness   Satting mid 80s on RA with good waveform on pulse oximetry   Abdominal: Abdomen is soft. She exhibits no distension. There is no abdominal tenderness.   Musculoskeletal:         General: Normal range of motion.      Cervical back: Normal range of motion and neck supple.      Comments: Thoracic spine tenderness to palpation      Neurological: She is alert and oriented to person, place, and time. She has normal strength. No cranial nerve deficit.   Skin: Skin is warm. No rash noted.   Psychiatric: She has a normal mood and affect.       ED Course   Procedures  Labs Reviewed   COMPREHENSIVE METABOLIC PANEL - Abnormal; Notable for the following components:       Result Value    Carbon Dioxide 34 (*)     Glucose Level 135 (*)     All other components within normal limits   CBC WITH DIFFERENTIAL - Abnormal; Notable for the following components:    RBC 3.94 (*)     Hgb 11.2 (*)     MCV 94.7 (*)     MCHC 30.0 (*)     MPV 10.5 (*)     All other components within normal limits   POCT GLUCOSE - Abnormal; Notable for the following components:    POCT Glucose 147 (*)     All other components within normal limits   TROPONIN I - Normal   SARS-COV-2 RNA AMPLIFICATION, QUAL - Normal    Narrative:     The IDNOW COVID-19 assay is a rapid molecular in vitro diagnostic test utilizing an isothermal nucleic acid amplification technology intended for the qualitative detection of nucleic acid from the SARS-CoV-2 viral RNA in direct nasal, nasopharyngeal or throat swabs from individuals who are suspected of COVID-19 by their healthcare provider.   CBC W/ AUTO DIFFERENTIAL    Narrative:     The following orders were created for panel order CBC auto differential.  Procedure                               Abnormality         Status                     ---------                                -----------         ------                     CBC with Differential[[id number]]        Abnormal            Final result                 Please view results for these tests on the individual orders.   POCT GLUCOSE   POCT GLUCOSE   POCT GLUCOSE MONITORING CONTINUOUS     EKG Readings: (Independently Interpreted)   Initial Reading: No STEMI. Rhythm: Normal Sinus Rhythm. Heart Rate: 79. Ectopy: No Ectopy. Conduction: Normal. ST Segments: Normal ST Segments. T Waves: Normal.   EKG done at 0059   ECG Results              EKG and show to ED MD (Final result)  Result time 10/30/22 13:03:25      Final result by Interface, Lab In [facility] (10/30/22 13:03:25)                   Narrative:    Test Reason : R55,    Vent. Rate : 079 BPM     Atrial Rate : 079 BPM     P-R Int : 162 ms          QRS Dur : 100 ms      QT Int : 392 ms       P-R-T Axes : 002 069 048 degrees     QTc Int : 449 ms    Normal sinus rhythm  Normal ECG  When compared with ECG of 26-SEP-2022 10:49,  T wave inversion no longer evident in Inferior leads  Confirmed by Yousuf Alvarez MD ([id number]) on 10/30/2022 1:03:19 PM    Referred By: PROVIDER NOTINSYSTEM           Confirmed By:Yousuf Alvarez MD                                  Imaging Results              CT Abdomen With Without Contrast (Final result)  Result time 10/30/22 12:47:05   Procedure changed from CT Abdomen Pelvis W Wo Contrast     Final result by Drake Titus MD (10/30/22 12:47:05)                   Impression:      Right renal parapelvic heterogeneous enhancing solid mass is concerning and may represent transitional cell carcinoma.      Electronically signed by: Drake Titus  Date:    10/30/2022  Time:    12:47               Narrative:    EXAMINATION:  CT ABDOMEN W WO CONTRAST    CLINICAL HISTORY:  R Renal Mass;    TECHNIQUE:  Multidetector axial images were obtained of the abdomen before and following the administration of IV contrast. Oral contrast was not administered.    Dose length product of 3313  mGycm. Automated exposure control was utilized to minimize radiation dose.    FINDINGS:  There is right renal central parapelvic solid heterogeneous enhancing mass with demarcated margins which measures 3.2 x 3.8 x 4.0 cm on image 40 series 9 and image 61 series 12.  This causes slight adjacent stretching and splaying of the calyces.  There are no associated calcifications.  Right kidney is mildly dilated with otherwise unremarkable enhancement.  No perinephric strandings.  Left kidney is unremarkable.  There are no retroperitoneal periaortic enlarged lymph nodes.  Renal arteries appear to be patent.  There is no apparent dilatation of the renal veins.    There are multiple fractures of the right ribs some of which show callus formation.  There is right posterolateral pleural thickening which is most distinct on image 9 series 9 and there is also associated intercostal space thickening and adjacent subcutaneous soft tissue inflammation.  Attention to follow-up exam.  No significant fluid accumulation within the pleural spaces.    Liver is unremarkable in size and attenuation without focal space occupying lesion.  Gallbladder is surgically absent.  Pancreas is unremarkable.  No findings of spleen.  Adrenals are of normal size and configuration.    Stomach is mostly decompressed.  No abnormal dilatation of of the loops of small bowel or the colon.                                       CT Thoracic Spine Without Contrast (Final result)  Result time 10/30/22 07:32:16      Final result by Drake Titus MD (10/30/22 07:32:16)                   Impression:    Impression:    1. No acute thoracic spine fracture dislocation or subluxation is identified.    2. There are multiple mildly displaced right 6th through 11th rib fractures with callus formation at some of the levels.    3. Degenerative changes as above.    4. There is fullness and a possible 4 cm isoattenuation mass in the right renal sinus. Consider additional  evaluation with post contrast imaging.    The results were discussed with the emergency room physician (Dr Monsalve) following the dictation at 2022-10-30 05:13:20 CDT    No significant discrepancy with overnight report.      Electronically signed by: Drake Titus  Date:    10/30/2022  Time:    07:32               Narrative:      Technique:CT of the thoracic spine without contrast with direct axial as well as sagittal and coronal reconstruction images.    Comparison:Comparison is with prior study performed 2022-10-27 16:55:42.    Dosage Information:Automated Exposure Control was utilized.    Clinical History:Back pain.    Findings:    Mineralization of the bony structures:Within normal limits for age.    Bone marrow:    Vertebral Fusion:None.    Curvature:Normal thoracic kyphosis.    Spondylolisthesis:None.    Fractures:No acute thoracic spine fracture dislocation or subluxation is identified.    Degenerative changes:    Intervertebral disc spaces:Mildly decreased disc height is seen at T8-T9.    Osteophytes:Multilevel small anterior marginal osteophytes are seen.    Endplates:Subtle endplate sclerosis is noted at T8-T9 and T11-T12.    Facet degenerative changes:None.    Spinal canal:Unremarkable with no bony spinal canal stenosis identified.    Miscellaneous:There are multiple mildly displaced right 6th through 11th rib fractures with callus formation at some of the levels.                        Preliminary result by Interface, Rad Results In (10/30/22 04:09:38)                   Narrative:    START OF REPORT:  Technique: CT of the thoracic spine without contrast with direct axial as well as sagittal and coronal reconstruction images.    Comparison: Comparison is with prior study performed2022-10-27 16:55:42.    Dosage Information: Automated Exposure Control was utilized.    Clinical History: Back pain.    Findings:  Mineralization of the bony structures: Within normal limits for age.  Bone marrow:  Vertebral Fusion:  None.  Curvature: Normal thoracic kyphosis.  Spondylolisthesis: None.  Fractures: No acute thoracic spine fracture dislocation or subluxation is identified.  Degenerative changes:  Intervertebral disc spaces: Mildly decreased disc height is seen at T8-T9.  Osteophytes: Multilevel small anterior marginal osteophytes are seen.  Endplates: Subtle endplate sclerosis is noted at T8-T9 and T11-T12.  Facet degenerative changes: None.  Spinal canal: Unremarkable with no bony spinal canal stenosis identified.    Miscellaneous: There are multiple mildly displaced right 6th through 11th rib fractures with callus formation at some of the levels.      Impression:  1. No acute thoracic spine fracture dislocation or subluxation is identified.  2. There are multiple mildly displaced right 6th through 11th rib fractures with callus formation at some of the levels.  3. Degenerative changes as above.                          Preliminary result by Interface, Rad Results In (10/30/22 04:09:38)                   Narrative:    START OF REPORT:  Technique: CT of the thoracic spine without contrast with direct axial as well as sagittal and coronal reconstruction images.    Comparison: Comparison is with prior study performed2022-10-27 16:55:42.    Dosage Information: Automated Exposure Control was utilized.    Clinical History: Back pain.    Findings:  Mineralization of the bony structures: Within normal limits for age.  Bone marrow:  Vertebral Fusion: None.  Curvature: Normal thoracic kyphosis.  Spondylolisthesis: None.  Fractures: No acute thoracic spine fracture dislocation or subluxation is identified.  Degenerative changes:  Intervertebral disc spaces: Mildly decreased disc height is seen at T8-T9.  Osteophytes: Multilevel small anterior marginal osteophytes are seen.  Endplates: Subtle endplate sclerosis is noted at T8-T9 and T11-T12.  Facet degenerative changes: None.  Spinal canal: Unremarkable with no bony spinal canal stenosis  identified.    Miscellaneous: There are multiple mildly displaced right 6th through 11th rib fractures with callus formation at some of the levels.      Impression:  1. No acute thoracic spine fracture dislocation or subluxation is identified.  2. There are multiple mildly displaced right 6th through 11th rib fractures with callus formation at some of the levels.  3. Degenerative changes as above.                          Preliminary result by Drake Titus MD (10/30/22 04:09:38)                   Narrative:    START OF REPORT:  Technique: CT of the thoracic spine without contrast with direct axial as well as sagittal and coronal reconstruction images.    Comparison: Comparison is with prior study performed2022-10-27 16:55:42.    Dosage Information: Automated Exposure Control was utilized.    Clinical History: Back pain.    Findings:  Mineralization of the bony structures: Within normal limits for age.  Bone marrow:  Vertebral Fusion: None.  Curvature: Normal thoracic kyphosis.  Spondylolisthesis: None.  Fractures: No acute thoracic spine fracture dislocation or subluxation is identified.  Degenerative changes:  Intervertebral disc spaces: Mildly decreased disc height is seen at T8-T9.  Osteophytes: Multilevel small anterior marginal osteophytes are seen.  Endplates: Subtle endplate sclerosis is noted at T8-T9 and T11-T12.  Facet degenerative changes: None.  Spinal canal: Unremarkable with no bony spinal canal stenosis identified.    Miscellaneous: There are multiple mildly displaced right 6th through 11th rib fractures with callus formation at some of the levels.      Impression:  1. No acute thoracic spine fracture dislocation or subluxation is identified.  2. There are multiple mildly displaced right 6th through 11th rib fractures with callus formation at some of the levels.  3. Degenerative changes as above.                          Preliminary result by Tray Soto MD (10/30/22 04:09:38)                    Narrative:    START OF REPORT:  Technique: CT of the thoracic spine without contrast with direct axial as well as sagittal and coronal reconstruction images.    Comparison: Comparison is with prior study performed2022-10-27 16:55:42.    Dosage Information: Automated Exposure Control was utilized.    Clinical History: Back pain.    Findings:  Mineralization of the bony structures: Within normal limits for age.  Bone marrow:  Vertebral Fusion: None.  Curvature: Normal thoracic kyphosis.  Spondylolisthesis: None.  Fractures: No acute thoracic spine fracture dislocation or subluxation is identified.  Degenerative changes:  Intervertebral disc spaces: Mildly decreased disc height is seen at T8-T9.  Osteophytes: Multilevel small anterior marginal osteophytes are seen.  Endplates: Subtle endplate sclerosis is noted at T8-T9 and T11-T12.  Facet degenerative changes: None.  Spinal canal: Unremarkable with no bony spinal canal stenosis identified.    Miscellaneous: There are multiple mildly displaced right 6th through 11th rib fractures with callus formation at some of the levels.      Impression:  1. No acute thoracic spine fracture dislocation or subluxation is identified.  2. There are multiple mildly displaced right 6th through 11th rib fractures with callus formation at some of the levels.  3. Degenerative changes as above.                                         X-Ray Hip 2 or 3 views Right (with Pelvis when performed) (Final result)  Result time 10/30/22 09:40:34      Final result by Srikanth Steinberg MD (10/30/22 09:40:34)                   Impression:      Degenerative changes.      Electronically signed by: Srikanth Steinberg  Date:    10/30/2022  Time:    09:40               Narrative:    EXAMINATION:  XR HIP WITH PELVIS WHEN PERFORMED, 2 OR 3  VIEWS RIGHT    CLINICAL HISTORY:  Unspecified fall, initial encounter    COMPARISON:  None.    FINDINGS:  No acute displaced fractures or dislocations.    There is some  narrowing of the inferior medial aspect of both hip joints with some narrowing of the sacroiliac joints and lumbosacral spine articular spaces otherwise preserved with smooth articular surfaces    No blastic or lytic lesions.    Soft tissues within normal limits.                                       X-Ray Chest AP Portable (Final result)  Result time 10/30/22 09:10:05      Final result by Srikanth Steinberg MD (10/30/22 09:10:05)                   Impression:      No acute chest disease is identified.      Electronically signed by: Srikanth Steinberg  Date:    10/30/2022  Time:    09:10               Narrative:    EXAMINATION:  XR CHEST AP PORTABLE    CLINICAL HISTORY:  , Chest pain, unspecified.    COMPARISON:  October 27, 2022    FINDINGS:  No alveolar consolidation, effusion, or pneumothorax is seen.   The thoracic aorta is normal  cardiac silhouette, central pulmonary vessels and mediastinum are normal in size and are grossly unremarkable.   visualized osseous structures are grossly unremarkable.                                       CT Head Without Contrast (Final result)  Result time 10/30/22 07:14:44      Final result by Drake Titus MD (10/30/22 07:14:44)                   Impression:      No acute intracranial findings identified.    No significant discrepancy with overnight report.      Electronically signed by: Drake Titus  Date:    10/30/2022  Time:    07:14               Narrative:    EXAMINATION:  CT HEAD WITHOUT CONTRAST    CLINICAL HISTORY:  Syncope, recurrent;    TECHNIQUE:  Sequential axial images were performed of the brain without contrast.    Dose product length of 1075 mGycm. Automated exposure control was utilized to minimize radiation dose.    COMPARISON:  None available.    FINDINGS:  There is no intracranial mass effect, midline shift, hydrocephalus or hemorrhage. There is no sulcal effacement or low attenuation changes to suggest recent large vessel territory infarction.  There is no  acute extra axial fluid collection.  There is left maxillary sinus small polyp or retention cyst.  Otherwise, visualized paranasal sinuses are clear without mucosal thickening, polypoidal abnormality or air-fluid levels. Mastoid air cells aeration is optimal.                        Preliminary result by Drake Titus MD (10/30/22 03:08:21)                   Narrative:    START OF REPORT:  Technique: CT of the head was performed without intravenous contrast with axial as well as coronal and sagittal images.    Comparison: None.    Dosage Information: Automated exposure control was utilized.    Clinical history: Syncope.    Findings:  Hemorrhage: No acute intracranial hemorrhage is seen.  CSF spaces: The ventricles sulci and basal cisterns are within normal limits for age.  Brain parenchyma: Unremarkable with preservation of the grey white junction throughout.  Cerebellum: Unremarkable.  Sella and skull base: The sella appears to be within normal limits for age.  Herniation: None.  Intracranial calcifications: Incidental note is made of bilateral choroid plexus calcification. Incidental note is made of some pineal region calcification.  Calvarium: No acute linear or depressed skull fracture is seen.    Maxillofacial Structures:  Paranasal sinuses: A small retention cyst or polyp is noted in the left maxillary sinus. The rest of the paranasal sinuses appear clear.  Orbits: The orbits appear unremarkable.  Zygomatic arches: The zygomatic arches are intact and unremarkable.  Temporal bones and mastoids: The temporal bones and mastoids appear unremarkable.  TMJ: The mandibular condyles appear normally placed with respect to the mandibular fossa.      Impression:  1. No acute intracranial process identified. Details as above.                          Preliminary result by Tray Soot MD (10/30/22 03:08:21)                   Narrative:    START OF REPORT:  Technique: CT of the head was performed without intravenous  contrast with axial as well as coronal and sagittal images.    Comparison: None.    Dosage Information: Automated exposure control was utilized.    Clinical history: Syncope.    Findings:  Hemorrhage: No acute intracranial hemorrhage is seen.  CSF spaces: The ventricles sulci and basal cisterns are within normal limits for age.  Brain parenchyma: Unremarkable with preservation of the grey white junction throughout.  Cerebellum: Unremarkable.  Sella and skull base: The sella appears to be within normal limits for age.  Herniation: None.  Intracranial calcifications: Incidental note is made of bilateral choroid plexus calcification. Incidental note is made of some pineal region calcification.  Calvarium: No acute linear or depressed skull fracture is seen.    Maxillofacial Structures:  Paranasal sinuses: A small retention cyst or polyp is noted in the left maxillary sinus. The rest of the paranasal sinuses appear clear.  Orbits: The orbits appear unremarkable.  Zygomatic arches: The zygomatic arches are intact and unremarkable.  Temporal bones and mastoids: The temporal bones and mastoids appear unremarkable.  TMJ: The mandibular condyles appear normally placed with respect to the mandibular fossa.      Impression:  1. No acute intracranial process identified. Details as above.                                      X-Rays:   Independently Interpreted Readings:   Chest X-Ray: No acute abnormalities.   Head CT: No hemorrhage.   Medications   ondansetron injection 4 mg (has no administration in time range)   acetaminophen tablet 650 mg (650 mg Oral Given 10/30/22 1110)   glucose chewable tablet 16 g (has no administration in time range)   glucose chewable tablet 24 g (has no administration in time range)   glucagon (human recombinant) injection 1 mg (has no administration in time range)   acetaminophen tablet 1,000 mg (has no administration in time range)   fluticasone furoate-vilanteroL 100-25 mcg/dose diskus inhaler 1  puff (has no administration in time range)   buPROPion TBSR 12 hr tablet 150 mg (has no administration in time range)   busPIRone tablet 10 mg (10 mg Oral Given 10/30/22 1432)   busPIRone tablet 15 mg (has no administration in time range)   furosemide tablet 80 mg (80 mg Oral Given 10/30/22 1432)   losartan tablet 100 mg (100 mg Oral Given 10/30/22 1432)   pantoprazole EC tablet 40 mg (40 mg Oral Given 10/30/22 1432)   propranoloL tablet 10 mg (has no administration in time range)   HYDROcodone-acetaminophen 5-325 mg per tablet 1 tablet (1 tablet Oral Given 10/30/22 0423)   orphenadrine injection 60 mg (60 mg Intramuscular Given 10/30/22 0537)   iopamidoL (ISOVUE-370) injection 100 mL (100 mLs Intravenous Given 10/30/22 1230)     Medical Decision Making:   History:   Old Medical Records: I decided to obtain old medical records.  Initial Assessment:   45 yo with morbid obesity, EMMA, obesity hypoventilation, presenting for multiple symptoms - recent falls, dizziness, possible syncopal events, low O2 (80s) on home pulse ox. Not on home oxygen, vent or CPAP - despite recent admission to [facility] with reported home health being set up. Patient with negative trauma, initial syncopal workup but consistently satting 80s on RA while awake. Needs home O2 or vent - had nursing call home health and patient unable to get this outpatient shortterm. Will admit to hospitalist.   Differential Diagnosis:   Hypoxemic/hypercapneic resp failure, pneumonia, covid/flu,  acs, arrhythmia, deconditioning, fracture  Independently Interpreted Test(s):   I have ordered and independently interpreted X-rays - see prior notes.  I have ordered and independently interpreted EKG Reading(s) - see prior notes  Clinical Tests:   Lab Tests: Ordered and Reviewed  Radiological Study: Ordered and Reviewed  Medical Tests: Ordered and Reviewed  ED Management:  Muscle relaxer   Norco  Supplemental O2  Other:   I have discussed this case with another health care  provider.        Steveibe Attestation:   Scribe #1: I performed the above scribed service and the documentation accurately describes the services I performed. I attest to the accuracy of the note.      ED Course as of 10/30/22 2001   Sun Oct 30, 2022   0715 Nursing now reporting to me that patient is hypoxic to upper 80s, low 90s on RA. On my exam, satting upper 80s on RA while awake, good waveform. Per chart review it seems patient was discharged recently from [facility] with plans for home health and home oxygen however patient reports to me that the this was not set up.  Nursing will investigate to see if correct home health orders have been placed.  [AC]   0852 Nurse has called home health, unable to get patient home on O2 today. Patient satting 80s on RA, now on 4L NC. Will admit to hospitalist.  [AC]   0852 Paged Hospitalist  [DP]      ED Course User Index  [AC] Martin Monsalve IV, MD  [DP] Yasmin Jerez                   Clinical Impression:   Final diagnoses:  [R55] Syncope  [W19.XXXA] Fall (Primary)  [G89.29] Other chronic pain  [S70.01XA] Contusion of right hip, initial encounter  [R42] Dizziness  [R55] Syncope, unspecified syncope type  [J96.90] Respiratory failure, unspecified chronicity, unspecified whether with hypoxia or hypercapnia        ED Disposition Condition    Admit Stable        I, Martin Monsalve MD personally performed the history, PE, MDM, and procedures as documented above and agree with the scribe's documentation.           Martin Monsalve IV, MD  10/30/22 2001

## 2022-10-30 NOTE — H&P
"[facility] Hospital Medicine History & Physical Examination       Patient Name: Linda Lorenz  MRN: [id number]  Patient Class: Emergency   Admission Date: 10/30/2022   Admitting Service: Hospital Medicine   Length of Stay: 0  Attending Physician: Dr. Stevens  Primary Care Provider: Jenaro Alvarado MD  Face-to-Face encounter date: 10/30/2022  Code Status: Full  Chief Complaint: Loss of Consciousness and Fall (Has had several episodes of syncopy today (calling them mini blackouts).  She did sustain a fall in the bathroom, complaint of ribs, and right hip pain.   States SP02 has been running low at home in the kala 80's.  SP02 in triage 95%)    Source of Information: Patient. Medical Records    HISTORY OF PRESENT ILLNESS:   Linda Lorenz is a 46 y.o. female with a PMHx of EMMA, CVA, HTN, anxiety, bipolar disorder, GERD, vertigo who presented to [facility] on 10/30/2022 with c/o multiple syncopal episodes x1 day. She reportedly fell in the bathroom and has c/o right rib pain and right hip pain following the fall. She also endorses decreased O2 levels at night in the 80's. Of note, she was recently admitted at [facility] from 9/26-10/1/22 with Hypoxia and reports she was suppose to be discharged on home O2; however per the discharge summary "was approved for home health, no need for supplemental O2 at SNF or LTAC at this time." She is awaiting CPAP machine as well. ECHO on 9/26/22 with LVEF 65%, negative bubble study.     Initial ED VS include /73, HR 80, RR 22, SpO2 95% on room air, temperature 97.3° F. Labs notable for hemoglobin 11.2, CO2 34, glucose 135.  She became hypoxic with O2 saturation of 87% requiring supplemental O2 at 4L. CT head negative for acute intracranial findings.  CT T-spine negative for acute thoracic spine fracture dislocation or subluxation, multiple mildly displaced right 6th through 11th rib fractures with callus formation at some levels, " degenerative changes, fullness and possible 4 cm isoattenuating mass in the right renal sinus. CXR negative.     REVIEW OF SYSTEMS:   Except as documented, all other systems reviewed and negative     PAST MEDICAL HISTORY:   EMMA, CVA, HTN, anxiety, bipolar disorder, GERD, vertigo    PAST SURGICAL HISTORY:     Cholecystectomy  Tubal Ligation    FAMILY HISTORY:   Reviewed and negative    SOCIAL HISTORY:   Denied alcohol, tobacco or illicit drug use    ALLERGIES:   Penicillins    HOME MEDICATIONS:     Prior to Admission medications    Medication Sig Start Date End Date Taking? Authorizing Provider   azithromycin (Z-SHERLEY) 250 MG tablet Take 1 tablet (250 mg total) by mouth once daily. Take first 2 tablets together, then 1 every day until finished. 10/27/22   DERRELL Ragland   cyclobenzaprine (FLEXERIL) 10 MG tablet Take 1 tablet (10 mg total) by mouth 3 (three) times daily as needed for Muscle spasms. 10/30/22 11/4/22  Martin Monsalve IV, MD   LIDOcaine (LIDODERM) 5 % Place 1 patch onto the skin once daily. Remove & Discard patch within 12 hours or as directed by MD 10/30/22   Martin Monsalve IV, MD   losartan (COZAAR) 100 MG tablet Take 1 tablet (100 mg total) by mouth once daily. 22  Juan Ramon Vasquez MD   semaglutide (OZEMPIC) 0.25 mg or 0.5 mg(2 mg/1.5 mL) pen injector Inject 0.25 mg into the skin every 7 days. 9/29/22 10/29/22  Juan Ramon Vasquez MD   cyclobenzaprine (FLEXERIL) 10 MG tablet Take 1 tablet (10 mg total) by mouth 3 (three) times daily as needed for Muscle spasms. 10/30/22 10/30/22  Martin Monsalve IV, MD   LIDOcaine (LIDODERM) 5 % Place 1 patch onto the skin once daily. Remove & Discard patch within 12 hours or as directed by MD 10/30/22 10/30/22  Martin Monsalve IV, MD       __________________________________________________________________________  INPATIENT LIST OF MEDICATIONS     Current Facility-Administered Medications:     HYDROcodone-acetaminophen 5-325 mg per  tablet 1 tablet, 1 tablet, Oral, ED 1 Time, Martin Monsalve IV, MD    Current Outpatient Medications:     azithromycin (Z-SHERLEY) 250 MG tablet, Take 1 tablet (250 mg total) by mouth once daily. Take first 2 tablets together, then 1 every day until finished., Disp: 6 tablet, Rfl: 0    cyclobenzaprine (FLEXERIL) 10 MG tablet, Take 1 tablet (10 mg total) by mouth 3 (three) times daily as needed for Muscle spasms., Disp: 30 tablet, Rfl: 0    LIDOcaine (LIDODERM) 5 %, Place 1 patch onto the skin once daily. Remove & Discard patch within 12 hours or as directed by MD, Disp: 7 patch, Rfl: 0    losartan (COZAAR) 100 MG tablet, Take 1 tablet (100 mg total) by mouth once daily., Disp: 90 tablet, Rfl: 3    Scheduled Meds:   HYDROcodone-acetaminophen  1 tablet Oral ED 1 Time     Continuous Infusions:  PRN Meds:.    PHYSICAL EXAM:     VITAL SIGNS: 24 HRS MIN & MAX LAST   Temp  Min: 97.3 °F (36.3 °C)  Max: 97.3 °F (36.3 °C) 97.3 °F (36.3 °C)   BP  Min: 98/72  Max: 140/82 115/72   Pulse  Min: 76  Max: 91  91   Resp  Min: 16  Max: 22 16   SpO2  Min: 87 %  Max: 99 % 99 %       General appearance: Well-developed female in no apparent distress.  HENT: Atraumatic head. Moist mucous membranes of oral cavity.  Eyes: Normal extraocular movements.   Neck: Supple.   Lungs: Clear to auscultation bilaterally.   Heart: Regular rate and rhythm. S1 and S2 present. BLE pitting pedal edema.  Abdomen: Soft, non-distended, non-tender. Morbidly obese  Musculoskeletal: Midline thoracic/lumbar tenderness  Skin: No Rash.   Neuro: Motor and sensory exams grossly intact.   Psych/mental status: Appropriate mood and affect. Responds appropriately to questions.     LABS AND IMAGING:     Recent Labs   Lab 10/30/22  0125   WBC 6.3   RBC 3.94*   HGB 11.2*   HCT 37.3   MCV 94.7*   MCH 28.4   MCHC 30.0*   RDW 14.9      MPV 10.5*       Recent Labs   Lab 10/30/22  0125      K 4.0   CO2 34*   BUN 16.5   CREATININE 0.87   CALCIUM 8.8   ALBUMIN 3.6   ALKPHOS  82   ALT 40   AST 19   BILITOT 0.3       Microbiology Results (last 7 days)       ** No results found for the last 168 hours. **             CT Thoracic Spine Without Contrast  Narrative: Technique:CT of the thoracic spine without contrast with direct axial as well as sagittal and coronal reconstruction images.    Comparison:Comparison is with prior study performed 2022-10-27 16:55:42.    Dosage Information:Automated Exposure Control was utilized.    Clinical History:Back pain.    Findings:    Mineralization of the bony structures:Within normal limits for age.    Bone marrow:    Vertebral Fusion:None.    Curvature:Normal thoracic kyphosis.    Spondylolisthesis:None.    Fractures:No acute thoracic spine fracture dislocation or subluxation is identified.    Degenerative changes:    Intervertebral disc spaces:Mildly decreased disc height is seen at T8-T9.    Osteophytes:Multilevel small anterior marginal osteophytes are seen.    Endplates:Subtle endplate sclerosis is noted at T8-T9 and T11-T12.    Facet degenerative changes:None.    Spinal canal:Unremarkable with no bony spinal canal stenosis identified.    Miscellaneous:There are multiple mildly displaced right 6th through 11th rib fractures with callus formation at some of the levels.  Impression: Impression:    1. No acute thoracic spine fracture dislocation or subluxation is identified.    2. There are multiple mildly displaced right 6th through 11th rib fractures with callus formation at some of the levels.    3. Degenerative changes as above.    4. There is fullness and a possible 4 cm isoattenuation mass in the right renal sinus. Consider additional evaluation with post contrast imaging.    The results were discussed with the emergency room physician (Dr Monsalve) following the dictation at 2022-10-30 05:13:20 CDT    No significant discrepancy with overnight report.    Electronically signed by: Drake Titus  Date:    10/30/2022  Time:    07:32  CT Head Without  Contrast  Narrative: EXAMINATION:  CT HEAD WITHOUT CONTRAST    CLINICAL HISTORY:  Syncope, recurrent;    TECHNIQUE:  Sequential axial images were performed of the brain without contrast.    Dose product length of 1075 mGycm. Automated exposure control was utilized to minimize radiation dose.    COMPARISON:  None available.    FINDINGS:  There is no intracranial mass effect, midline shift, hydrocephalus or hemorrhage. There is no sulcal effacement or low attenuation changes to suggest recent large vessel territory infarction.  There is no acute extra axial fluid collection.  There is left maxillary sinus small polyp or retention cyst.  Otherwise, visualized paranasal sinuses are clear without mucosal thickening, polypoidal abnormality or air-fluid levels. Mastoid air cells aeration is optimal.  Impression: No acute intracranial findings identified.    No significant discrepancy with overnight report.    Electronically signed by: Drake Titus  Date:    10/30/2022  Time:    07:14        ASSESSMENT & PLAN:   Acute Hypoxemic Respiratory Failure  Syncope and Collapse  Mildly Displaced Right 6-11th Rib Fractures, Healing  EMMA, not on CPAP  Morbid Obesity  4 cm Isoattenuation in the Right Renal Sinus per CT  Hx of CVA, HTN, anxiety, bipolar disorder, GERD, vertigo    Plan:  ABG pending  Supplemental O2 as needed  B Carotid US ordered  Recent Echo with normal EF and LV function  PRN analgesics  Resume appropriate home medications  Labs in AM    VTE Prophylaxis: SCDs    Discharge Planning and Disposition: TBJACE OSEGUERA, Cheryle Xie, NP have reviewed and discussed the case with Dr. Stevens.  Please see the attending MD's addendum for further assessment and plan.    Cheryle Xie, AGACNP-BC  10/30/2022    _______________________________________________________________________________  MD Addendum:  Dr. OUMAR , assumed care of this patient today at --am/pm  For the patient encounter, I performed the substantive portion of the  visit, I reviewed the NP/PA documentation, treatment plan, and medical decision making.  I had face to face time with this patient     A. History:    B. Physical exam:    C. Medical decision making:      All diagnosis and differential diagnosis have been reviewed; assessment and plan has been documented; I have personally reviewed the labs and test results that are presently available; I have reviewed the patients medication list; I have reviewed the consulting providers response and recommendations. I have reviewed or attempted to review medical records based upon their availability.    All of the patient and family questions have been addressed and answered. Patient's is agreeable to the above stated plan. I will continue to monitor closely and make adjustments to medical management as needed.      10/30/2022

## 2022-10-30 NOTE — Clinical Note
Diagnosis: Fall [134750]   Admitting Provider:: BETHANY ELY [[id number]]   Future Attending Provider: BETHANY ELY [[id number]]   Reason for IP Medical Treatment  (Clinical interventions that can only be accomplished in the IP setting? ) :: home O2   Estimated Length of Stay:: 2 midnights   I certify that Inpatient services for greater than or equal to 2 midnights are medically necessary:: Yes   Plans for Post-Acute care--if anticipated (pick the single best option):: A. No post acute care anticipated at this time   Special Needs:: No Special Needs [1]

## 2022-10-30 NOTE — DISCHARGE INSTRUCTIONS
Discussed with the patient and all questioned fully answered. She will call me if any problems arise.

## 2022-10-31 PROCEDURE — 27000221 HC OXYGEN, UP TO 24 HOURS

## 2022-10-31 PROCEDURE — 11000001 HC ACUTE MED/SURG PRIVATE ROOM

## 2022-10-31 PROCEDURE — 25000003 PHARM REV CODE 250: Performed by: INTERNAL MEDICINE

## 2022-10-31 RX ADMIN — PROPRANOLOL HYDROCHLORIDE 10 MG: 10 TABLET ORAL at 08:10

## 2022-10-31 RX ADMIN — BUPROPION HYDROCHLORIDE 150 MG: 150 TABLET, EXTENDED RELEASE ORAL at 08:10

## 2022-10-31 RX ADMIN — FUROSEMIDE 80 MG: 40 TABLET ORAL at 09:10

## 2022-10-31 RX ADMIN — BUSPIRONE HYDROCHLORIDE 10 MG: 5 TABLET ORAL at 09:10

## 2022-10-31 RX ADMIN — LOSARTAN POTASSIUM 100 MG: 50 TABLET, FILM COATED ORAL at 09:10

## 2022-10-31 RX ADMIN — ACETAMINOPHEN 1000 MG: 500 TABLET ORAL at 10:10

## 2022-10-31 RX ADMIN — PANTOPRAZOLE SODIUM 40 MG: 40 TABLET, DELAYED RELEASE ORAL at 09:10

## 2022-10-31 RX ADMIN — BUPROPION HYDROCHLORIDE 150 MG: 150 TABLET, EXTENDED RELEASE ORAL at 09:10

## 2022-10-31 RX ADMIN — PROPRANOLOL HYDROCHLORIDE 10 MG: 10 TABLET ORAL at 09:10

## 2022-10-31 RX ADMIN — BUSPIRONE HYDROCHLORIDE 15 MG: 5 TABLET ORAL at 08:10

## 2022-10-31 RX ADMIN — ACETAMINOPHEN 1000 MG: 500 TABLET ORAL at 04:10

## 2022-10-31 RX ADMIN — ACETAMINOPHEN 1000 MG: 500 TABLET ORAL at 09:10

## 2022-10-31 NOTE — PLAN OF CARE
Dr Castillo asked for consult to be made to [facility]. Reason is right renal parapelvic heterogenous solid mass concerning for transitional cell carcinoma. Referral made via [facility]. Pt notified.

## 2022-10-31 NOTE — PLAN OF CARE
10/31/22 1535   Discharge Assessment   Assessment Type Discharge Planning Assessment   Source of Information patient   Reason For Admission syncope   Lives With spouse;child(nia), adult   Do you expect to return to your current living situation? Yes   Do you have help at home or someone to help you manage your care at home? Yes   Who are your caregiver(s) and their phone number(s)? Rubens , [phone]   Prior to hospitilization cognitive status: Alert/Oriented;No Deficits   Current cognitive status: Alert/Oriented;No Deficits   Walking or Climbing Stairs Difficulty ambulation difficulty, requires equipment   Mobility Management has a walker and a wheelchair   Dressing/Bathing Difficulty none   Equipment Currently Used at Home walker, standard;bath bench;wheelchair   Do you currently have service(s) that help you manage your care at home? Yes   Name and Contact number of agency [facility] provides PT/OT services   Is the pt/caregiver preference to resume services with current agency Yes   Who is going to help you get home at discharge? Rubens   How do you get to doctors appointments? family or friend will provide   Are you on dialysis? No   Do you take coumadin? No   Discharge Plan A Home Health;Home with family   Discharge Plan B Home Health;Home with family   Discharge Plan discussed with: Patient;Spouse/sig other   Name(s) and Number(s) Rubens , [phone]   Pt states she lives with  and 23 year old daughter. Per pt,  helps with ADL's. Pt does not drive. Her  drives her. Pt states [facility] provides PT/OT services. She has DME. [facility] consult has been made.

## 2022-10-31 NOTE — NURSING
Nurses Note -- 4 Eyes      10/30/2022   7:03 PM      Skin assessed during: Admit      [x] No Pressure Injuries Present    [x]Prevention Measures Documented  Generalized moisture & redness, friction irritation, no open wounds     [] Yes- Altered Skin Integrity Present or Discovered   [] LDA Added if Not in Epic (Describe Wound)   [] New Altered Skin Integrity was Present on Admit and Documented in LDA   [] Wound Image Taken    Wound Care Consulted? No    Attending Nurse:  Carmelita Church RN    Second RN/Staff Member:  Sharon Amador RN

## 2022-10-31 NOTE — CONSULTS
Linda Lorenz 1975  [id number]  10/31/2022    CONSULTING PHYSICIAN: Rodney    Reason for consult: Renal mass    HPI: Ms. Lorenz is a 45 yo female PMH listed below presented to the ED with multiple syncopal episodes. Reportedly fell in the bathroom and complaining of right hip and rib pain. Thoracic CT  shows no acute thoracic spine fractures, multiple mildly displaced right 6 th-11 th ribs. Abdominal/pelvic CT shows right renal parapelvic heterogeneous solid mass concerning for transitional call carcinoma. Was recently discharged due to respiratory failure with need for Cpap and home oxygen.      Past Medical History:   Diagnosis Date    Anxiety disorder, unspecified     Benign paroxysmal vertigo, bilateral     Bipolar disorder, unspecified     CVA (cerebral vascular accident)     Depression     GERD (gastroesophageal reflux disease)     Hypertension     Neuropathy      Past Surgical History:   Procedure Laterality Date     SECTION      CHOLECYSTECTOMY      TUBAL LIGATION       No family history on file.    Social History     Tobacco Use    Smoking status: Former     Types: Cigarettes    Smokeless tobacco: Never   Substance Use Topics    Alcohol use: Not Currently    Drug use: Never     Current Facility-Administered Medications   Medication Dose Route Frequency Provider Last Rate Last Admin    acetaminophen tablet 1,000 mg  1,000 mg Oral Q6H PRN Emre Stevens MD   1,000 mg at 10/31/22 0919    acetaminophen tablet 650 mg  650 mg Oral Q8H PRN Cheryle Xie AGACNP-BC   650 mg at 10/30/22 1110    buPROPion TBSR 12 hr tablet 150 mg  150 mg Oral BID Emre Stevens MD   150 mg at 10/31/22 0919    busPIRone tablet 10 mg  10 mg Oral Daily Emre Stevens MD   10 mg at 10/31/22 0920    busPIRone tablet 15 mg  15 mg Oral QHS Emre Stevens MD   15 mg at 10/30/22 204    fluticasone furoate-vilanteroL 100-25 mcg/dose diskus inhaler 1 puff  1 puff Inhalation Daily Emre Stevens MD        furosemide tablet  "80 mg  80 mg Oral Daily Emre Stevens MD   80 mg at 10/31/22 0919    glucagon (human recombinant) injection 1 mg  1 mg Intramuscular PRN STEVE Rice        glucose chewable tablet 16 g  16 g Oral PRN Cheryle Xie, LELA-BC        glucose chewable tablet 24 g  24 g Oral PRN Cheryle Xie, LELA-BC        losartan tablet 100 mg  100 mg Oral Daily Emre Stevens MD   100 mg at 10/31/22 0919    ondansetron injection 4 mg  4 mg Intravenous Q8H PRN Cheryle Xie, STEVE        pantoprazole EC tablet 40 mg  40 mg Oral Daily Emre Stevens MD   40 mg at 10/31/22 0920    propranoloL tablet 10 mg  10 mg Oral BID Emre Stevens MD   10 mg at 10/31/22 0919     Review of patient's allergies indicates:   Allergen Reactions    Penicillins      ROS: 12 point review of systems negative other than the HPI    PHYSICAL EXAM:  Vitals:    10/31/22 0000 10/31/22 0153 10/31/22 0523 10/31/22 0700   BP: (!) 119/53  (!) 120/59 (!) 142/78   BP Location:       Patient Position:       Pulse: 82  80 78   Resp: 18      Temp: 98.1 °F (36.7 °C)  98.2 °F (36.8 °C) 97.7 °F (36.5 °C)   TempSrc: Oral   Oral   SpO2: 98%  99% 100%   Weight:  (!) 195 kg (429 lb 14.4 oz)     Height:  5' 4" (1.626 m)       No intake or output data in the 24 hours ending 10/31/22 1056    GEN: WN/WD NAD morbidly obese  HEENT: NCAT, PERRLA, EOMI, OP clear, nares patent  CV: RRR  RESP: Even and unlabored  ABD: obese  : right CVA tenderness  EXT: no C/C/E  NEURO: no focal deficits, MAEW, AAOx4      LABS:  Recent Results (from the past 24 hour(s))   POCT glucose    Collection Time: 10/30/22 11:59 AM   Result Value Ref Range    POCT Glucose 107 70 - 110 mg/dL   CV Ultrasound Bilateral Doppler Carotid    Collection Time: 10/30/22 12:02 PM   Result Value Ref Range    Left ICA/CCA ratio 1.13     Right ICA/CCA ratio 1.13     Left Highest .00     Left Highest      Right Highest .00     Right Highest      Right Highest EDV 31.00     LT " Highest EDV 67.00     Right CCA prox sys 148 cm/s    Right CCA prox gates 32 cm/s    Right CCA dist sys 111 cm/s    Right CCA dist gates 19 cm/s    Right ICA prox sys 110 cm/s    Right ICA prox gates 29 cm/s    Right ICA mid sys 120 cm/s    Right ICA mid gates 31 cm/s    Right ICA dist sys 125 cm/s    Right ICA dist gates 0 cm/s    Right ECA sys 168 cm/s    Right vertebral sys 25 cm/s    Left CCA prox sys 152 cm/s    Left CCA prox gates 50 cm/s    Left CCA dist sys 150 cm/s    Left CCA dist gates 38 cm/s    Left ICA prox sys 100 cm/s    Left ICA prox gates 28 cm/s    Left ICA mid sys 102 cm/s    Left ICA mid gates 32 cm/s    Left ICA dist sys 170 cm/s    Left ICA dist gates 67 cm/s    Left ECA sys 146 cm/s    Left vertebral sys 32 cm/s    Right ECA gates 14 cm/s    Left ECA gates 21 cm/s   POCT glucose    Collection Time: 10/30/22  4:12 PM   Result Value Ref Range    POCT Glucose 98 70 - 110 mg/dL         IMAGING:  FINDINGS:  There is right renal central parapelvic solid heterogeneous enhancing mass with demarcated margins which measures 3.2 x 3.8 x 4.0 cm on image 40 series 9 and image 61 series 12.  This causes slight adjacent stretching and splaying of the calyces.  There are no associated calcifications.  Right kidney is mildly dilated with otherwise unremarkable enhancement.  No perinephric strandings.  Left kidney is unremarkable.  There are no retroperitoneal periaortic enlarged lymph nodes.  Renal arteries appear to be patent.  There is no apparent dilatation of the renal veins.     There are multiple fractures of the right ribs some of which show callus formation.  There is right posterolateral pleural thickening which is most distinct on image 9 series 9 and there is also associated intercostal space thickening and adjacent subcutaneous soft tissue inflammation.  Attention to follow-up exam.  No significant fluid accumulation within the pleural spaces.     Liver is unremarkable in size and attenuation without focal  space occupying lesion.  Gallbladder is surgically absent.  Pancreas is unremarkable.  No findings of spleen.  Adrenals are of normal size and configuration.     Stomach is mostly decompressed.  No abnormal dilatation of of the loops of small bowel or the colon.     Impression:  Right renal parapelvic heterogeneous enhancing solid mass is concerning and may represent transitional cell carcinoma.         ASSESSMENT:  Acute on chronic respiratory failure  Rib fractures  Right renal tumor; incidental finding   Morbid obesity      PLAN:  Will discuss surgical intervention  Dr. Castillo to round later    Charlotte Hess, DERRELL

## 2022-11-01 VITALS
TEMPERATURE: 98 F | HEIGHT: 64 IN | RESPIRATION RATE: 18 BRPM | OXYGEN SATURATION: 96 % | BODY MASS INDEX: 50.02 KG/M2 | DIASTOLIC BLOOD PRESSURE: 84 MMHG | WEIGHT: 293 LBS | SYSTOLIC BLOOD PRESSURE: 135 MMHG | HEART RATE: 81 BPM

## 2022-11-01 PROBLEM — R55 SYNCOPE AND COLLAPSE: Status: ACTIVE | Noted: 2022-11-01

## 2022-11-01 PROBLEM — R55 SYNCOPE AND COLLAPSE: Status: RESOLVED | Noted: 2022-11-01 | Resolved: 2022-11-01

## 2022-11-01 PROBLEM — E66.2 OBESITY HYPOVENTILATION SYNDROME: Status: ACTIVE | Noted: 2022-11-01

## 2022-11-01 LAB
ANION GAP SERPL CALC-SCNC: 9 MEQ/L
BUN SERPL-MCNC: 13.3 MG/DL (ref 7–18.7)
CALCIUM SERPL-MCNC: 9.5 MG/DL (ref 8.4–10.2)
CHLORIDE SERPL-SCNC: 93 MMOL/L (ref 98–107)
CO2 SERPL-SCNC: 35 MMOL/L (ref 22–29)
CREAT SERPL-MCNC: 0.66 MG/DL (ref 0.55–1.02)
CREAT/UREA NIT SERPL: 20
ERYTHROCYTE [DISTWIDTH] IN BLOOD BY AUTOMATED COUNT: 14.6 % (ref 11.5–17)
GFR SERPLBLD CREATININE-BSD FMLA CKD-EPI: >60 MLS/MIN/1.73/M2
GLUCOSE SERPL-MCNC: 106 MG/DL (ref 74–100)
HCT VFR BLD AUTO: 37.3 % (ref 37–47)
HGB BLD-MCNC: 11.3 GM/DL (ref 12–16)
MCH RBC QN AUTO: 28.3 PG (ref 27–31)
MCHC RBC AUTO-ENTMCNC: 30.3 MG/DL (ref 33–36)
MCV RBC AUTO: 93.3 FL (ref 80–94)
NRBC BLD AUTO-RTO: 0 %
PLATELET # BLD AUTO: 272 X10(3)/MCL (ref 130–400)
PMV BLD AUTO: 11 FL (ref 7.4–10.4)
POTASSIUM SERPL-SCNC: 3.6 MMOL/L (ref 3.5–5.1)
RBC # BLD AUTO: 4 X10(6)/MCL (ref 4.2–5.4)
SODIUM SERPL-SCNC: 137 MMOL/L (ref 136–145)
WBC # SPEC AUTO: 5.5 X10(3)/MCL (ref 4.5–11.5)

## 2022-11-01 PROCEDURE — 80048 BASIC METABOLIC PNL TOTAL CA: CPT | Performed by: INTERNAL MEDICINE

## 2022-11-01 PROCEDURE — 36415 COLL VENOUS BLD VENIPUNCTURE: CPT | Performed by: INTERNAL MEDICINE

## 2022-11-01 PROCEDURE — 85027 COMPLETE CBC AUTOMATED: CPT | Performed by: INTERNAL MEDICINE

## 2022-11-01 PROCEDURE — 25000003 PHARM REV CODE 250: Performed by: INTERNAL MEDICINE

## 2022-11-01 RX ORDER — IBUPROFEN 600 MG/1
600 TABLET ORAL 3 TIMES DAILY PRN
Qty: 30 TABLET | Refills: 0 | Status: SHIPPED | OUTPATIENT
Start: 2022-11-01 | End: 2022-11-11

## 2022-11-01 RX ORDER — ACETAMINOPHEN 500 MG
1000 TABLET ORAL EVERY 6 HOURS PRN
Qty: 160 TABLET | Refills: 0 | Status: SHIPPED | OUTPATIENT
Start: 2022-11-01 | End: 2022-11-21

## 2022-11-01 RX ORDER — SEMAGLUTIDE 1.34 MG/ML
0.25 INJECTION, SOLUTION SUBCUTANEOUS
Qty: 1 PEN | Refills: 2 | Status: SHIPPED | OUTPATIENT
Start: 2022-11-01 | End: 2022-12-01

## 2022-11-01 RX ORDER — PROPRANOLOL HYDROCHLORIDE 10 MG/1
10 TABLET ORAL 2 TIMES DAILY
Qty: 60 TABLET | Refills: 2 | Status: SHIPPED | OUTPATIENT
Start: 2022-11-01

## 2022-11-01 RX ADMIN — ACETAMINOPHEN 1000 MG: 500 TABLET ORAL at 04:11

## 2022-11-01 RX ADMIN — PANTOPRAZOLE SODIUM 40 MG: 40 TABLET, DELAYED RELEASE ORAL at 09:11

## 2022-11-01 RX ADMIN — LOSARTAN POTASSIUM 100 MG: 50 TABLET, FILM COATED ORAL at 09:11

## 2022-11-01 RX ADMIN — PROPRANOLOL HYDROCHLORIDE 10 MG: 10 TABLET ORAL at 09:11

## 2022-11-01 RX ADMIN — BUPROPION HYDROCHLORIDE 150 MG: 150 TABLET, EXTENDED RELEASE ORAL at 09:11

## 2022-11-01 RX ADMIN — FUROSEMIDE 80 MG: 40 TABLET ORAL at 09:11

## 2022-11-01 RX ADMIN — BUSPIRONE HYDROCHLORIDE 10 MG: 5 TABLET ORAL at 09:11

## 2022-11-01 NOTE — DISCHARGE SUMMARY
"[facility] Hospital Medicine Discharge Summary    Admit Date: 10/30/2022  Discharge Date and Time: 11/1/20229:14 AM  Admitting Physician:  Team  Discharging Physician: Hebert Kellogg MD.  Primary Care Physician: Jenaro Alvarado MD  Consults: Urology    Discharge Diagnoses:  Acute on chronic Hypoxemic Hypercapnic Respiratory Failure due to Obesity hypoventilation syndrome   Chronic metabolic alkalosis as a compensation for respiratory acidosis  Syncope and Collapse at home  Mildly Displaced Right 6-11th Rib Fractures, Healing  Morbid Obesity with BMI 73.7  4 cm Isoattenuation in the Right Renal Sinus per CT--> concern for Right renal tumor   Hx of CVA, HTN, anxiety, bipolar disorder, GERD, vertigo       Hospital Course:   Linda Lorenz is a 46 y.o. female with a PMHx of EMMA, CVA, HTN, anxiety, bipolar disorder, GERD, vertigo who presented to [facility] on 10/30/2022 with c/o multiple syncopal episodes x1 day. She reportedly fell in the bathroom and has c/o right rib pain and right hip pain following the fall. She also endorses decreased O2 levels at night in the 80's. Of note, she was recently admitted at [facility] from 9/26-10/1/22 with Hypoxia and reports she was suppose to be discharged on home O2; however per the discharge summary "was approved for home health, no need for supplemental O2 at SNF or LTAC at this time." She is awaiting CPAP machine as well. ECHO on 9/26/22 with LVEF 65%, negative bubble study.   Initial ED VS include /73, HR 80, RR 22, SpO2 95% on room air, temperature 97.3° F. Labs notable for hemoglobin 11.2, CO2 34, glucose 135.  She became hypoxic with O2 saturation of 87% requiring supplemental O2 at 4L. CT head negative for acute intracranial findings.  CT T-spine negative for acute thoracic spine fracture dislocation or subluxation, multiple mildly displaced right 6th through 11th rib fractures with callus formation at some levels, degenerative changes, " fullness and possible 4 cm isoattenuating mass in the right renal sinus. CXR negative.   Admitted as inpatient on10/30  ABG --> PH 7.33, pCO2 81, PO2 115, P CO HC03 42.7  Supplemental O2 as needed  For this patient, volume and dilation is indicated.  Review that are ordering home noninvasive volume went later for hours of sleep as well as during the daytime if the symptoms arise.  BiPAP is not appropriate for this patient is ventilatory requirement  B Carotid US prelim report states no hemodynamically significant stenosis in R ICA, though L ICA demonstrated 50-69% stenosis  Recent Echo with normal EF and LV function  Urology has been consulted for concern of right renal tumor.  Appreciate their recommendations  PRN analgesics- AVOID NARCOTICS OR SEDATING MEDICATIONS, I have cancelled her flexeril and baclofen and norco  Resume appropriate home medications  Urology Dr. Castillo evaluated the patient regarding concern for right renal cancer.  Case personally discussed with him, informed me that she is a very high risk patient with that BMI to undergo surgery hair and recommended we refer her to [facility] for surgical need.  Case management is on board for the referral and pt aware   I had a long conversation with the patient in the room regarding the need for bariatric surgery, patient reported she did not convert apart for because she cannot afford the procedure.  Informed patient that her primary care physician can refer her to bariatric surgery Clinic in [facility] if she is determined to lose weight.  She has to be in a program for 6 months and follow the recommendation and lose weight before they will proceed with the surgery and she will have to make that commitment to herself to improve her health.  Patient verbalized understanding, informed she has a PCP and will request a referral.  Nurse informed me that patient trilogy has been arranged and patient is now ready for discharge, requesting  discharge orders.  I have counseled the patient regarding regular use of trilogy during the day if she is taking a nap or feeling tired and definitely during the night.  Encourage patient to get adjusted and used to her trilogy to prevent complications down the road    Pt was seen and examined on the day of discharge  Vitals:  VITAL SIGNS: 24 HRS MIN & MAX LAST   Temp  Min: 97.3 °F (36.3 °C)  Max: 98.2 °F (36.8 °C) 97.3 °F (36.3 °C)   BP  Min: 128/80  Max: 152/84 (!) 152/84     Pulse  Min: 82  Max: 85  82   Resp  Min: 16  Max: 20 18   SpO2  Min: 95 %  Max: 100 % 95 %       Physical Exam:  General: In no acute distress, afebrile, morbidly obese  Chest:  Decreased air entry due to body habitus, supplemental oxygen via nasal cannula at 2 liters/minute  Heart:  Distant heart sounds due to body habitus   Abdomen:  Obese, large pannus, bowel sounds positive  MSK: Warm, positive for lower extremity edema  Neurologic: Alert and oriented x4, Cranial nerve II-XII intact    Procedures Performed: No admission procedures for hospital encounter.     Significant Diagnostic Studies: See Full reports for all details    Recent Labs   Lab 10/30/22  0125 11/01/22  0410   WBC 6.3 5.5   RBC 3.94* 4.00*   HGB 11.2* 11.3*   HCT 37.3 37.3   MCV 94.7* 93.3   MCH 28.4 28.3   MCHC 30.0* 30.3*   RDW 14.9 14.6    272   MPV 10.5* 11.0*       Recent Labs   Lab 10/30/22  0125 10/30/22  1033 11/01/22  0410     --  137   K 4.0  --  3.6   CO2 34*  --  35*   BUN 16.5  --  13.3   CREATININE 0.87  --  0.66   CALCIUM 8.8  --  9.5   PH  --  7.33*  --    ALBUMIN 3.6  --   --    ALKPHOS 82  --   --    ALT 40  --   --    AST 19  --   --    BILITOT 0.3  --   --         Microbiology Results (last 7 days)       ** No results found for the last 168 hours. **             CT Abdomen With Without Contrast  Narrative: EXAMINATION:  CT ABDOMEN W WO CONTRAST    CLINICAL HISTORY:  R Renal Mass;    TECHNIQUE:  Multidetector axial images were obtained of the  abdomen before and following the administration of IV contrast. Oral contrast was not administered.    Dose length product of 3313 mGycm. Automated exposure control was utilized to minimize radiation dose.    FINDINGS:  There is right renal central parapelvic solid heterogeneous enhancing mass with demarcated margins which measures 3.2 x 3.8 x 4.0 cm on image 40 series 9 and image 61 series 12.  This causes slight adjacent stretching and splaying of the calyces.  There are no associated calcifications.  Right kidney is mildly dilated with otherwise unremarkable enhancement.  No perinephric strandings.  Left kidney is unremarkable.  There are no retroperitoneal periaortic enlarged lymph nodes.  Renal arteries appear to be patent.  There is no apparent dilatation of the renal veins.    There are multiple fractures of the right ribs some of which show callus formation.  There is right posterolateral pleural thickening which is most distinct on image 9 series 9 and there is also associated intercostal space thickening and adjacent subcutaneous soft tissue inflammation.  Attention to follow-up exam.  No significant fluid accumulation within the pleural spaces.    Liver is unremarkable in size and attenuation without focal space occupying lesion.  Gallbladder is surgically absent.  Pancreas is unremarkable.  No findings of spleen.  Adrenals are of normal size and configuration.    Stomach is mostly decompressed.  No abnormal dilatation of of the loops of small bowel or the colon.  Impression: Right renal parapelvic heterogeneous enhancing solid mass is concerning and may represent transitional cell carcinoma.    Electronically signed by: Drake Titsu  Date:    10/30/2022  Time:    12:47  X-Ray Hip 2 or 3 views Right (with Pelvis when performed)  Narrative: EXAMINATION:  XR HIP WITH PELVIS WHEN PERFORMED, 2 OR 3  VIEWS RIGHT    CLINICAL HISTORY:  Unspecified fall, initial encounter    COMPARISON:  None.    FINDINGS:  No  acute displaced fractures or dislocations.    There is some narrowing of the inferior medial aspect of both hip joints with some narrowing of the sacroiliac joints and lumbosacral spine articular spaces otherwise preserved with smooth articular surfaces    No blastic or lytic lesions.    Soft tissues within normal limits.  Impression: Degenerative changes.    Electronically signed by: Srikanth Steinberg  Date:    10/30/2022  Time:    09:40  X-Ray Chest AP Portable  Narrative: EXAMINATION:  XR CHEST AP PORTABLE    CLINICAL HISTORY:  , Chest pain, unspecified.    COMPARISON:  October 27, 2022    FINDINGS:  No alveolar consolidation, effusion, or pneumothorax is seen.   The thoracic aorta is normal  cardiac silhouette, central pulmonary vessels and mediastinum are normal in size and are grossly unremarkable.   visualized osseous structures are grossly unremarkable.  Impression: No acute chest disease is identified.    Electronically signed by: Srikanth Steinberg  Date:    10/30/2022  Time:    09:10  CT Thoracic Spine Without Contrast  Narrative: Technique:CT of the thoracic spine without contrast with direct axial as well as sagittal and coronal reconstruction images.    Comparison:Comparison is with prior study performed 2022-10-27 16:55:42.    Dosage Information:Automated Exposure Control was utilized.    Clinical History:Back pain.    Findings:    Mineralization of the bony structures:Within normal limits for age.    Bone marrow:    Vertebral Fusion:None.    Curvature:Normal thoracic kyphosis.    Spondylolisthesis:None.    Fractures:No acute thoracic spine fracture dislocation or subluxation is identified.    Degenerative changes:    Intervertebral disc spaces:Mildly decreased disc height is seen at T8-T9.    Osteophytes:Multilevel small anterior marginal osteophytes are seen.    Endplates:Subtle endplate sclerosis is noted at T8-T9 and T11-T12.    Facet degenerative changes:None.    Spinal canal:Unremarkable with no  bony spinal canal stenosis identified.    Miscellaneous:There are multiple mildly displaced right 6th through 11th rib fractures with callus formation at some of the levels.  Impression: Impression:    1. No acute thoracic spine fracture dislocation or subluxation is identified.    2. There are multiple mildly displaced right 6th through 11th rib fractures with callus formation at some of the levels.    3. Degenerative changes as above.    4. There is fullness and a possible 4 cm isoattenuation mass in the right renal sinus. Consider additional evaluation with post contrast imaging.    The results were discussed with the emergency room physician (Dr Monsalve) following the dictation at 2022-10-30 05:13:20 CDT    No significant discrepancy with overnight report.    Electronically signed by: Drake Titus  Date:    10/30/2022  Time:    07:32  CT Head Without Contrast  Narrative: EXAMINATION:  CT HEAD WITHOUT CONTRAST    CLINICAL HISTORY:  Syncope, recurrent;    TECHNIQUE:  Sequential axial images were performed of the brain without contrast.    Dose product length of 1075 mGycm. Automated exposure control was utilized to minimize radiation dose.    COMPARISON:  None available.    FINDINGS:  There is no intracranial mass effect, midline shift, hydrocephalus or hemorrhage. There is no sulcal effacement or low attenuation changes to suggest recent large vessel territory infarction.  There is no acute extra axial fluid collection.  There is left maxillary sinus small polyp or retention cyst.  Otherwise, visualized paranasal sinuses are clear without mucosal thickening, polypoidal abnormality or air-fluid levels. Mastoid air cells aeration is optimal.  Impression: No acute intracranial findings identified.    No significant discrepancy with overnight report.    Electronically signed by: Drake Titus  Date:    10/30/2022  Time:    07:14         Medication List        START taking these medications      acetaminophen 500 MG  tablet  Commonly known as: TYLENOL  Take 2 tablets (1,000 mg total) by mouth every 6 (six) hours as needed for Pain.     ibuprofen 600 MG tablet  Commonly known as: ADVIL,MOTRIN  Take 1 tablet (600 mg total) by mouth 3 (three) times daily as needed for Pain.     LIDOcaine 5 %  Commonly known as: LIDODERM  Place 1 patch onto the skin once daily. Remove & Discard patch within 12 hours or as directed by MD            CHANGE how you take these medications      propranoloL 10 MG tablet  Commonly known as: INDERAL  Take 1 tablet (10 mg total) by mouth 2 (two) times daily.  What changed:   how much to take  when to take this  Another medication with the same name was removed. Continue taking this medication, and follow the directions you see here.            CONTINUE taking these medications      BREO ELLIPTA 100-25 mcg/dose diskus inhaler  Generic drug: fluticasone furoate-vilanteroL     buPROPion 150 MG TBSR 12 hr tablet  Commonly known as: WELLBUTRIN SR     * busPIRone 10 MG tablet  Commonly known as: BUSPAR     * busPIRone 15 MG tablet  Commonly known as: BUSPAR     folic acid 1 MG tablet  Commonly known as: FOLVITE     LASIX 80 MG tablet  Generic drug: furosemide     losartan 100 MG tablet  Commonly known as: COZAAR  Take 1 tablet (100 mg total) by mouth once daily.     OZEMPIC 0.25 mg or 0.5 mg(2 mg/1.5 mL) pen injector  Generic drug: semaglutide  Inject 0.25 mg into the skin every 7 days.     pantoprazole 40 MG tablet  Commonly known as: PROTONIX     potassium chloride 10 MEQ Cpsr  Commonly known as: MICRO-K           * This list has 2 medication(s) that are the same as other medications prescribed for you. Read the directions carefully, and ask your doctor or other care provider to review them with you.                STOP taking these medications      azithromycin 250 MG tablet  Commonly known as: Z-SHERLEY     baclofen 20 MG tablet  Commonly known as: LIORESAL     benzonatate 200 MG capsule  Commonly known as:  TESSALON     gabapentin 300 MG capsule  Commonly known as: NEURONTIN     HYDROcodone-acetaminophen 5-325 mg per tablet  Commonly known as: NORCO     hydrOXYzine pamoate 25 MG Cap  Commonly known as: VISTARIL     meclizine 25 mg tablet  Commonly known as: ANTIVERT     QUEtiapine 300 MG Tab  Commonly known as: SEROQUEL               Where to Get Your Medications        [facility] [address] [facility] [place] [facility] [address] [facility]  [address] [facility] [address]      Phone: [phone]   acetaminophen 500 MG tablet  ibuprofen 600 MG tablet  LIDOcaine 5 %  OZEMPIC 0.25 mg or 0.5 mg(2 mg/1.5 mL) pen injector  propranoloL 10 MG tablet          Explained in detail to the patient about the discharge plan, medications, and follow-up visits. Pt understands and agrees with the treatment plan  Discharge Disposition: Home or Self Care   Discharged Condition: stable  Diet-   Dietary Orders (From admission, onward)       Start     Ordered    10/30/22 1004  Diet heart healthy  Diet effective now         10/30/22 1003                   Medications Per DC med rec  Activities as tolerated   Follow-up Information       Jenaro Alvarado MD. Schedule an appointment as soon as possible for a visit .    Specialty: Family Medicine  Why: request referral to Bariatric surgeon in [place] / [facility]  Contact information:  [address] [place] [address] [phone]               [facility] Emergency Dept. Go to .    Specialty: Emergency Medicine  Why: If symptoms worsen  Contact information:  [address] [place] [id number] [phone]             F/U with Urology at [facility] to adress mass of your kidney Follow up.                           For further questions contact hospitalist office    Discharge time 35 minutes    For worsening symptoms, chest pain, shortness of breath, increased abdominal pain, high grade fever, stroke or stroke like symptoms,  immediately go to the nearest Emergency Room or call 911 as soon as possible.      Hebert Kellogg MD  Department of Hospital Medicine   [facility]   11/01/2022 9:14 AM

## 2022-11-01 NOTE — PLAN OF CARE
Previous referral made to [facility] is not correct. Faxed referral to [phone]  [facility] NO for right parapelvic heterogenous solid mass concerning for transitional cell carcinoma. Spoke to office [phone]. They will call patient to make an appointment.

## 2022-11-02 ENCOUNTER — PATIENT OUTREACH (OUTPATIENT)
Dept: ADMINISTRATIVE | Facility: CLINIC | Age: 47
End: 2022-11-02
Payer: MEDICAID

## 2022-11-02 NOTE — PROGRESS NOTES
C3 nurse attempted to contact Linda Lorenz  for a TCC post hospital discharge follow up call. No answer. Left voicemail with callback information. The patient has a scheduled HOSFU appointment with Jenaro Alvarado MD  on 11/08/2022 @ 1115 am.

## 2022-11-03 LAB
LEFT CCA DIST DIAS: 38 CM/S
LEFT CCA DIST SYS: 150 CM/S
LEFT CCA PROX DIAS: 50 CM/S
LEFT CCA PROX SYS: 152 CM/S
LEFT ECA DIAS: 21 CM/S
LEFT ECA SYS: 146 CM/S
LEFT ICA DIST DIAS: 67 CM/S
LEFT ICA DIST SYS: 170 CM/S
LEFT ICA MID DIAS: 32 CM/S
LEFT ICA MID SYS: 102 CM/S
LEFT ICA PROX DIAS: 28 CM/S
LEFT ICA PROX SYS: 100 CM/S
LEFT VERTEBRAL SYS: 32 CM/S
OHS CV CAROTID RIGHT ICA EDV HIGHEST: 31
OHS CV CAROTID ULTRASOUND LEFT ICA/CCA RATIO: 1.13
OHS CV CAROTID ULTRASOUND RIGHT ICA/CCA RATIO: 1.13
OHS CV PV CAROTID LEFT HIGHEST CCA: 152
OHS CV PV CAROTID LEFT HIGHEST ICA: 170
OHS CV PV CAROTID RIGHT HIGHEST CCA: 148
OHS CV PV CAROTID RIGHT HIGHEST ICA: 125
OHS CV US CAROTID LEFT HIGHEST EDV: 67
RIGHT CCA DIST DIAS: 19 CM/S
RIGHT CCA DIST SYS: 111 CM/S
RIGHT CCA PROX DIAS: 32 CM/S
RIGHT CCA PROX SYS: 148 CM/S
RIGHT ECA DIAS: 14 CM/S
RIGHT ECA SYS: 168 CM/S
RIGHT ICA DIST DIAS: 0 CM/S
RIGHT ICA DIST SYS: 125 CM/S
RIGHT ICA MID DIAS: 31 CM/S
RIGHT ICA MID SYS: 120 CM/S
RIGHT ICA PROX DIAS: 29 CM/S
RIGHT ICA PROX SYS: 110 CM/S
RIGHT VERTEBRAL SYS: 25 CM/S

## 2022-11-03 NOTE — PROGRESS NOTES
C3 nurse spoke with Linda Lorenz  for a TCC post hospital discharge follow up call. The patient has a scheduled HOSFU appointment with Jenaro Alvarado MD  on 11/08/2022 @ 1115 am.

## 2022-11-15 NOTE — PLAN OF CARE
Pt phoned and states she did not receive a phone call for appointment in [place]. Called [facility] [phone]. They have not received the referral. Refaxed referral to [phone]. Office states it takes 24-48 hours for processing. Notified patient at .

## 2022-12-13 ENCOUNTER — LAB VISIT (OUTPATIENT)
Dept: LAB | Facility: HOSPITAL | Age: 47
End: 2022-12-13
Attending: FAMILY MEDICINE
Payer: MEDICAID

## 2022-12-13 DIAGNOSIS — Z12.11 SPECIAL SCREENING FOR MALIGNANT NEOPLASMS, COLON: Primary | ICD-10-CM

## 2022-12-13 DIAGNOSIS — I10 ESSENTIAL HYPERTENSION, MALIGNANT: ICD-10-CM

## 2022-12-13 DIAGNOSIS — Z11.59 SCREENING EXAMINATION FOR POLIOMYELITIS: ICD-10-CM

## 2022-12-13 LAB
ALBUMIN SERPL-MCNC: 3.4 GM/DL (ref 3.5–5)
ALBUMIN/GLOB SERPL: 1 RATIO (ref 1.1–2)
ALP SERPL-CCNC: 82 UNIT/L (ref 40–150)
ALT SERPL-CCNC: 27 UNIT/L (ref 0–55)
AST SERPL-CCNC: 20 UNIT/L (ref 5–34)
BASOPHILS # BLD AUTO: 0.01 X10(3)/MCL (ref 0–0.2)
BASOPHILS NFR BLD AUTO: 0.2 %
BILIRUBIN DIRECT+TOT PNL SERPL-MCNC: 0.2 MG/DL
BUN SERPL-MCNC: 14 MG/DL (ref 7–18.7)
CALCIUM SERPL-MCNC: 9.1 MG/DL (ref 8.4–10.2)
CHLORIDE SERPL-SCNC: 101 MMOL/L (ref 98–107)
CHOLEST SERPL-MCNC: 148 MG/DL
CHOLEST/HDLC SERPL: 4 {RATIO} (ref 0–5)
CO2 SERPL-SCNC: 30 MMOL/L (ref 22–29)
CREAT SERPL-MCNC: 0.69 MG/DL (ref 0.55–1.02)
EOSINOPHIL # BLD AUTO: 0.15 X10(3)/MCL (ref 0–0.9)
EOSINOPHIL NFR BLD AUTO: 3.1 %
ERYTHROCYTE [DISTWIDTH] IN BLOOD BY AUTOMATED COUNT: 14.8 % (ref 11.5–17)
GFR SERPLBLD CREATININE-BSD FMLA CKD-EPI: >60 MLS/MIN/1.73/M2
GLOBULIN SER-MCNC: 3.3 GM/DL (ref 2.4–3.5)
GLUCOSE SERPL-MCNC: 142 MG/DL (ref 74–100)
HCT VFR BLD AUTO: 36.1 % (ref 37–47)
HCV AB SERPL QL IA: NONREACTIVE
HDLC SERPL-MCNC: 39 MG/DL (ref 35–60)
HGB BLD-MCNC: 10.9 GM/DL (ref 12–16)
HIV 1+2 AB+HIV1 P24 AG SERPL QL IA: NONREACTIVE
IMM GRANULOCYTES # BLD AUTO: 0.02 X10(3)/MCL (ref 0–0.04)
IMM GRANULOCYTES NFR BLD AUTO: 0.4 %
LDLC SERPL CALC-MCNC: 78 MG/DL (ref 50–140)
LYMPHOCYTES # BLD AUTO: 1.64 X10(3)/MCL (ref 0.6–4.6)
LYMPHOCYTES NFR BLD AUTO: 33.5 %
MCH RBC QN AUTO: 27.9 PG (ref 27–31)
MCHC RBC AUTO-ENTMCNC: 30.2 MG/DL (ref 33–36)
MCV RBC AUTO: 92.3 FL (ref 80–94)
MONOCYTES # BLD AUTO: 0.38 X10(3)/MCL (ref 0.1–1.3)
MONOCYTES NFR BLD AUTO: 7.8 %
NEUTROPHILS # BLD AUTO: 2.7 X10(3)/MCL (ref 2.1–9.2)
NEUTROPHILS NFR BLD AUTO: 55 %
PLATELET # BLD AUTO: 277 X10(3)/MCL (ref 130–400)
PMV BLD AUTO: 10.7 FL (ref 7.4–10.4)
POTASSIUM SERPL-SCNC: 3.7 MMOL/L (ref 3.5–5.1)
PROT SERPL-MCNC: 6.7 GM/DL (ref 6.4–8.3)
RBC # BLD AUTO: 3.91 X10(6)/MCL (ref 4.2–5.4)
SODIUM SERPL-SCNC: 139 MMOL/L (ref 136–145)
TRIGL SERPL-MCNC: 157 MG/DL (ref 37–140)
TSH SERPL-ACNC: 2.48 UIU/ML (ref 0.35–4.94)
VLDLC SERPL CALC-MCNC: 31 MG/DL
WBC # SPEC AUTO: 4.9 X10(3)/MCL (ref 4.5–11.5)

## 2022-12-13 PROCEDURE — 80061 LIPID PANEL: CPT

## 2022-12-13 PROCEDURE — 36415 COLL VENOUS BLD VENIPUNCTURE: CPT

## 2022-12-13 PROCEDURE — 84443 ASSAY THYROID STIM HORMONE: CPT

## 2022-12-13 PROCEDURE — 80053 COMPREHEN METABOLIC PANEL: CPT

## 2022-12-13 PROCEDURE — 86803 HEPATITIS C AB TEST: CPT

## 2022-12-13 PROCEDURE — 87389 HIV-1 AG W/HIV-1&-2 AB AG IA: CPT

## 2022-12-13 PROCEDURE — 85025 COMPLETE CBC W/AUTO DIFF WBC: CPT

## 2023-01-05 ENCOUNTER — LAB VISIT (OUTPATIENT)
Dept: LAB | Facility: HOSPITAL | Age: 48
End: 2023-01-05
Attending: FAMILY MEDICINE
Payer: MEDICAID

## 2023-01-05 DIAGNOSIS — R73.9 CHRONIC HYPERGLYCEMIA: ICD-10-CM

## 2023-01-05 DIAGNOSIS — D64.9 ANEMIA, UNSPECIFIED TYPE: Primary | ICD-10-CM

## 2023-01-05 LAB
BASOPHILS # BLD AUTO: 0.02 X10(3)/MCL (ref 0–0.2)
BASOPHILS NFR BLD AUTO: 0.3 %
EOSINOPHIL # BLD AUTO: 0.16 X10(3)/MCL (ref 0–0.9)
EOSINOPHIL NFR BLD AUTO: 2.8 %
ERYTHROCYTE [DISTWIDTH] IN BLOOD BY AUTOMATED COUNT: 15.7 % (ref 11–14.5)
EST. AVERAGE GLUCOSE BLD GHB EST-MCNC: 108.3 MG/DL
FERRITIN SERPL-MCNC: 47.68 NG/ML (ref 4.63–204)
FOLATE SERPL-MCNC: 18.5 NG/ML (ref 7–31.4)
GLUCOSE SERPL-MCNC: 92 MG/DL (ref 74–100)
HBA1C MFR BLD: 5.4 %
HCT VFR BLD AUTO: 37 % (ref 37–47)
HGB BLD-MCNC: 11.3 GM/DL (ref 12–16)
IMM GRANULOCYTES # BLD AUTO: 0.01 X10(3)/MCL (ref 0–0.04)
IMM GRANULOCYTES NFR BLD AUTO: 0.2 %
IRON SATN MFR SERPL: 15 % (ref 20–50)
IRON SERPL-MCNC: 52 UG/DL (ref 50–170)
LYMPHOCYTES # BLD AUTO: 1.84 X10(3)/MCL (ref 0.6–4.6)
LYMPHOCYTES NFR BLD AUTO: 32.2 %
MCH RBC QN AUTO: 27.9 PG
MCHC RBC AUTO-ENTMCNC: 30.5 MG/DL (ref 33–36)
MCV RBC AUTO: 91.4 FL (ref 80–94)
MONOCYTES # BLD AUTO: 0.42 X10(3)/MCL (ref 0.1–1.3)
MONOCYTES NFR BLD AUTO: 7.3 %
NEUTROPHILS # BLD AUTO: 3.27 X10(3)/MCL (ref 2.1–9.2)
NEUTROPHILS NFR BLD AUTO: 57.2 %
PLATELET # BLD AUTO: 248 X10(3)/MCL (ref 140–371)
PMV BLD AUTO: 11 FL (ref 9.4–12.4)
RBC # BLD AUTO: 4.05 X10(6)/MCL (ref 4.2–5.4)
RET# (OHS): 0.09 (ref 0.02–0.08)
RETICULOCYTE COUNT AUTOMATED (OLG): 2.19 % (ref 1.1–2.1)
TIBC SERPL-MCNC: 290 UG/DL (ref 70–310)
TIBC SERPL-MCNC: 342 UG/DL (ref 250–450)
VIT B12 SERPL-MCNC: 240 PG/ML (ref 213–816)
WBC # SPEC AUTO: 5.7 X10(3)/MCL (ref 4.5–11.5)

## 2023-01-05 PROCEDURE — 36415 COLL VENOUS BLD VENIPUNCTURE: CPT

## 2023-01-05 PROCEDURE — 85045 AUTOMATED RETICULOCYTE COUNT: CPT

## 2023-01-05 PROCEDURE — 82947 ASSAY GLUCOSE BLOOD QUANT: CPT

## 2023-01-05 PROCEDURE — 85025 COMPLETE CBC W/AUTO DIFF WBC: CPT

## 2023-01-05 PROCEDURE — 83036 HEMOGLOBIN GLYCOSYLATED A1C: CPT

## 2023-01-05 PROCEDURE — 82728 ASSAY OF FERRITIN: CPT

## 2023-01-05 PROCEDURE — 83550 IRON BINDING TEST: CPT

## 2023-01-05 PROCEDURE — 82607 VITAMIN B-12: CPT

## 2023-01-05 PROCEDURE — 82746 ASSAY OF FOLIC ACID SERUM: CPT

## 2023-02-20 DIAGNOSIS — N28.89 RENAL MASS: Primary | ICD-10-CM

## 2023-04-28 ENCOUNTER — HOSPITAL ENCOUNTER (OUTPATIENT)
Dept: RADIOLOGY | Facility: HOSPITAL | Age: 48
Discharge: HOME | End: 2023-04-28
Attending: UROLOGY
Payer: MEDICAID

## 2023-04-28 DIAGNOSIS — N28.89 RENAL MASS: ICD-10-CM

## 2023-04-28 LAB
CREAT SERPL-MCNC: 0.7 MG/DL (ref 0.5–1.4)
SAMPLE: NORMAL

## 2023-04-28 PROCEDURE — 25500020 PHARM REV CODE 255

## 2023-04-28 PROCEDURE — 74178 CT ABD&PLV WO CNTR FLWD CNTR: CPT | Mod: TC

## 2023-04-28 RX ADMIN — IOPAMIDOL 100 ML: 755 INJECTION, SOLUTION INTRAVENOUS at 08:04

## 2023-07-19 DIAGNOSIS — C64.1 RENAL CELL CARCINOMA, RIGHT: Primary | ICD-10-CM

## 2023-08-09 ENCOUNTER — HOSPITAL ENCOUNTER (OUTPATIENT)
Dept: RADIOLOGY | Facility: HOSPITAL | Age: 48
Discharge: HOME | End: 2023-08-09
Attending: UROLOGY
Payer: MEDICAID

## 2023-08-09 DIAGNOSIS — C64.1 RENAL CELL CARCINOMA, RIGHT: ICD-10-CM

## 2023-08-09 LAB
CREAT SERPL-MCNC: 1.2 MG/DL (ref 0.5–1.4)
SAMPLE: NORMAL

## 2023-08-09 PROCEDURE — 25500020 PHARM REV CODE 255

## 2023-08-09 PROCEDURE — 74178 CT ABD&PLV WO CNTR FLWD CNTR: CPT | Mod: TC

## 2023-08-09 RX ADMIN — IOPAMIDOL 100 ML: 755 INJECTION, SOLUTION INTRAVENOUS at 09:08

## 2023-08-18 ENCOUNTER — HOSPITAL ENCOUNTER (OUTPATIENT)
Dept: RADIOLOGY | Facility: HOSPITAL | Age: 48
Discharge: HOME | End: 2023-08-18
Attending: FAMILY MEDICINE
Payer: MEDICAID

## 2023-08-18 DIAGNOSIS — R06.00 DYSPNEA: ICD-10-CM

## 2023-08-18 PROCEDURE — 71046 X-RAY EXAM CHEST 2 VIEWS: CPT | Mod: TC

## 2023-08-24 ENCOUNTER — LAB VISIT (OUTPATIENT)
Dept: LAB | Facility: HOSPITAL | Age: 48
End: 2023-08-24
Attending: FAMILY MEDICINE
Payer: MEDICAID

## 2023-08-24 DIAGNOSIS — I10 HYPERTENSION, UNSPECIFIED TYPE: ICD-10-CM

## 2023-08-24 DIAGNOSIS — Z79.899 ENCOUNTER FOR LONG-TERM (CURRENT) USE OF OTHER MEDICATIONS: Primary | ICD-10-CM

## 2023-08-24 LAB
ALBUMIN SERPL-MCNC: 3.5 G/DL (ref 3.5–5)
ALBUMIN/GLOB SERPL: 0.9 RATIO (ref 1.1–2)
ALP SERPL-CCNC: 80 UNIT/L (ref 40–150)
ALT SERPL-CCNC: 48 UNIT/L (ref 0–55)
AST SERPL-CCNC: 31 UNIT/L (ref 5–34)
BASOPHILS # BLD AUTO: 0.03 X10(3)/MCL
BASOPHILS NFR BLD AUTO: 0.7 %
BILIRUB SERPL-MCNC: 0.3 MG/DL
BUN SERPL-MCNC: 25 MG/DL (ref 7–18.7)
CALCIUM SERPL-MCNC: 9.6 MG/DL (ref 8.4–10.2)
CHLORIDE SERPL-SCNC: 102 MMOL/L (ref 98–107)
CHOLEST SERPL-MCNC: 196 MG/DL
CHOLEST/HDLC SERPL: 4 {RATIO} (ref 0–5)
CO2 SERPL-SCNC: 31 MMOL/L (ref 22–29)
CREAT SERPL-MCNC: 0.97 MG/DL (ref 0.55–1.02)
EOSINOPHIL # BLD AUTO: 0.16 X10(3)/MCL (ref 0–0.9)
EOSINOPHIL NFR BLD AUTO: 3.5 %
ERYTHROCYTE [DISTWIDTH] IN BLOOD BY AUTOMATED COUNT: 13.8 % (ref 11.5–17)
GFR SERPLBLD CREATININE-BSD FMLA CKD-EPI: >60 MLS/MIN/1.73/M2
GLOBULIN SER-MCNC: 3.7 GM/DL (ref 2.4–3.5)
GLUCOSE SERPL-MCNC: 89 MG/DL (ref 74–100)
HCT VFR BLD AUTO: 36.4 % (ref 37–47)
HDLC SERPL-MCNC: 46 MG/DL (ref 35–60)
HGB BLD-MCNC: 11.2 G/DL (ref 12–16)
IMM GRANULOCYTES # BLD AUTO: 0.01 X10(3)/MCL (ref 0–0.04)
IMM GRANULOCYTES NFR BLD AUTO: 0.2 %
LDLC SERPL CALC-MCNC: 117 MG/DL (ref 50–140)
LYMPHOCYTES # BLD AUTO: 1.78 X10(3)/MCL (ref 0.6–4.6)
LYMPHOCYTES NFR BLD AUTO: 38.8 %
MCH RBC QN AUTO: 29.6 PG (ref 27–31)
MCHC RBC AUTO-ENTMCNC: 30.8 G/DL (ref 33–36)
MCV RBC AUTO: 96 FL (ref 80–94)
MONOCYTES # BLD AUTO: 0.42 X10(3)/MCL (ref 0.1–1.3)
MONOCYTES NFR BLD AUTO: 9.2 %
NEUTROPHILS # BLD AUTO: 2.19 X10(3)/MCL (ref 2.1–9.2)
NEUTROPHILS NFR BLD AUTO: 47.6 %
PLATELET # BLD AUTO: 210 X10(3)/MCL (ref 130–400)
PMV BLD AUTO: 11.2 FL (ref 7.4–10.4)
POTASSIUM SERPL-SCNC: 4 MMOL/L (ref 3.5–5.1)
PROT SERPL-MCNC: 7.2 GM/DL (ref 6.4–8.3)
RBC # BLD AUTO: 3.79 X10(6)/MCL (ref 4.2–5.4)
SODIUM SERPL-SCNC: 141 MMOL/L (ref 136–145)
TRIGL SERPL-MCNC: 163 MG/DL (ref 37–140)
TSH SERPL-ACNC: 4.76 UIU/ML (ref 0.35–4.94)
VLDLC SERPL CALC-MCNC: 33 MG/DL
WBC # SPEC AUTO: 4.59 X10(3)/MCL (ref 4.5–11.5)

## 2023-08-24 PROCEDURE — 80061 LIPID PANEL: CPT

## 2023-08-24 PROCEDURE — 85025 COMPLETE CBC W/AUTO DIFF WBC: CPT

## 2023-08-24 PROCEDURE — 84443 ASSAY THYROID STIM HORMONE: CPT

## 2023-08-24 PROCEDURE — 36415 COLL VENOUS BLD VENIPUNCTURE: CPT

## 2023-08-24 PROCEDURE — 80053 COMPREHEN METABOLIC PANEL: CPT

## 2023-08-25 DIAGNOSIS — R06.00 DYSPNEA: Primary | ICD-10-CM

## 2023-09-12 ENCOUNTER — HOSPITAL ENCOUNTER (OUTPATIENT)
Dept: RADIOLOGY | Facility: HOSPITAL | Age: 48
Discharge: HOME | End: 2023-09-12
Attending: FAMILY MEDICINE
Payer: MEDICAID

## 2023-09-12 DIAGNOSIS — R06.00 DYSPNEA: ICD-10-CM

## 2023-09-12 LAB
AV PEAK GRADIENT: 10 MMHG
AV VALVE AREA BY VELOCITY RATIO: 2.42 CM²
AV VELOCITY RATIO: 0.78
CV ECHO LV RWT: 0.71 CM
DOP CALC AO PEAK VEL: 1.57 M/S
DOP CALC LVOT AREA: 3.1 CM2
DOP CALC LVOT DIAMETER: 1.99 CM
DOP CALC LVOT PEAK VEL: 1.22 M/S
DOP CALC MV VTI: 34.2 CM
E WAVE DECELERATION TIME: 294.67 MSEC
E/A RATIO: 1.29
ECHO LV POSTERIOR WALL: 1.42 CM (ref 0.6–1.1)
FRACTIONAL SHORTENING: 36 % (ref 28–44)
INTERVENTRICULAR SEPTUM: 1.41 CM (ref 0.6–1.1)
LEFT ATRIUM SIZE: 4.48 CM
LEFT INTERNAL DIMENSION IN SYSTOLE: 2.57 CM (ref 2.1–4)
LEFT VENTRICLE DIASTOLIC VOLUME: 70.53 ML
LEFT VENTRICLE SYSTOLIC VOLUME: 23.83 ML
LEFT VENTRICULAR INTERNAL DIMENSION IN DIASTOLE: 4.01 CM (ref 3.5–6)
LEFT VENTRICULAR MASS: 213.2 G
MV MEAN GRADIENT: 2 MMHG
MV PEAK A VEL: 0.68 M/S
MV PEAK E VEL: 0.88 M/S
MV PEAK GRADIENT: 5 MMHG
MV STENOSIS PRESSURE HALF TIME: 85.45 MS
MV VALVE AREA P 1/2 METHOD: 2.57 CM2
PV PEAK GRADIENT: 4 MMHG
PV PEAK VELOCITY: 1.04 M/S
RIGHT VENTRICULAR END-DIASTOLIC DIMENSION: 2.84 CM

## 2023-09-12 PROCEDURE — 93306 TTE W/DOPPLER COMPLETE: CPT

## 2023-11-10 RX ORDER — LOSARTAN POTASSIUM 100 MG/1
100 TABLET ORAL DAILY
Qty: 90 TABLET | Refills: 3 | Status: CANCELLED | OUTPATIENT
Start: 2023-11-10 | End: 2024-11-09

## 2023-12-04 DIAGNOSIS — C64.1 RENAL CARCINOMA, RIGHT: Primary | ICD-10-CM

## 2023-12-28 RX ORDER — LOSARTAN POTASSIUM 100 MG/1
100 TABLET ORAL DAILY
Qty: 90 TABLET | Refills: 3 | OUTPATIENT
Start: 2023-12-28 | End: 2024-12-27

## 2023-12-29 ENCOUNTER — HOSPITAL ENCOUNTER (OUTPATIENT)
Dept: RADIOLOGY | Facility: HOSPITAL | Age: 48
Discharge: HOME | End: 2023-12-29
Attending: UROLOGY
Payer: MEDICAID

## 2023-12-29 DIAGNOSIS — C64.1 RENAL CARCINOMA, RIGHT: ICD-10-CM

## 2023-12-29 LAB
CREAT SERPL-MCNC: 1 MG/DL (ref 0.5–1.4)
SAMPLE: NORMAL

## 2023-12-29 PROCEDURE — 74178 CT ABD&PLV WO CNTR FLWD CNTR: CPT | Mod: TC

## 2023-12-29 PROCEDURE — 25500020 PHARM REV CODE 255

## 2023-12-29 PROCEDURE — 71045 X-RAY EXAM CHEST 1 VIEW: CPT | Mod: TC

## 2023-12-29 RX ADMIN — IOPAMIDOL 100 ML: 755 INJECTION, SOLUTION INTRAVENOUS at 10:12

## 2024-02-26 ENCOUNTER — LAB VISIT (OUTPATIENT)
Dept: LAB | Facility: HOSPITAL | Age: 49
End: 2024-02-26
Attending: FAMILY MEDICINE
Payer: MEDICARE

## 2024-02-26 DIAGNOSIS — Z79.899 ENCOUNTER FOR LONG-TERM (CURRENT) USE OF OTHER MEDICATIONS: Primary | ICD-10-CM

## 2024-02-26 LAB
ALBUMIN SERPL-MCNC: 3.7 G/DL (ref 3.5–5)
ALBUMIN/GLOB SERPL: 1.1 RATIO (ref 1.1–2)
ALP SERPL-CCNC: 63 UNIT/L (ref 40–150)
ALT SERPL-CCNC: 36 UNIT/L (ref 0–55)
AST SERPL-CCNC: 24 UNIT/L (ref 5–34)
BASOPHILS # BLD AUTO: 0.01 X10(3)/MCL
BASOPHILS NFR BLD AUTO: 0.2 %
BILIRUB SERPL-MCNC: 0.4 MG/DL
BUN SERPL-MCNC: 24 MG/DL (ref 7–18.7)
CALCIUM SERPL-MCNC: 9.5 MG/DL (ref 8.4–10.2)
CHLORIDE SERPL-SCNC: 103 MMOL/L (ref 98–107)
CHOLEST SERPL-MCNC: 143 MG/DL
CHOLEST/HDLC SERPL: 4 {RATIO} (ref 0–5)
CO2 SERPL-SCNC: 29 MMOL/L (ref 22–29)
CREAT SERPL-MCNC: 1.08 MG/DL (ref 0.55–1.02)
EOSINOPHIL # BLD AUTO: 0.1 X10(3)/MCL (ref 0–0.9)
EOSINOPHIL NFR BLD AUTO: 2.1 %
ERYTHROCYTE [DISTWIDTH] IN BLOOD BY AUTOMATED COUNT: 13.8 % (ref 11.5–17)
GFR SERPLBLD CREATININE-BSD FMLA CKD-EPI: >60 MLS/MIN/1.73/M2
GLOBULIN SER-MCNC: 3.5 GM/DL (ref 2.4–3.5)
GLUCOSE SERPL-MCNC: 104 MG/DL (ref 74–100)
HCT VFR BLD AUTO: 37.6 % (ref 37–47)
HDLC SERPL-MCNC: 39 MG/DL (ref 35–60)
HGB BLD-MCNC: 12.2 G/DL (ref 12–16)
IMM GRANULOCYTES # BLD AUTO: 0.02 X10(3)/MCL (ref 0–0.04)
IMM GRANULOCYTES NFR BLD AUTO: 0.4 %
LDLC SERPL CALC-MCNC: 76 MG/DL (ref 50–140)
LYMPHOCYTES # BLD AUTO: 1.58 X10(3)/MCL (ref 0.6–4.6)
LYMPHOCYTES NFR BLD AUTO: 32.8 %
MCH RBC QN AUTO: 28.6 PG (ref 27–31)
MCHC RBC AUTO-ENTMCNC: 32.4 G/DL (ref 33–36)
MCV RBC AUTO: 88.1 FL (ref 80–94)
MONOCYTES # BLD AUTO: 0.34 X10(3)/MCL (ref 0.1–1.3)
MONOCYTES NFR BLD AUTO: 7.1 %
NEUTROPHILS # BLD AUTO: 2.76 X10(3)/MCL (ref 2.1–9.2)
NEUTROPHILS NFR BLD AUTO: 57.4 %
PLATELET # BLD AUTO: 222 X10(3)/MCL (ref 130–400)
PMV BLD AUTO: 11 FL (ref 7.4–10.4)
POTASSIUM SERPL-SCNC: 3.9 MMOL/L (ref 3.5–5.1)
PROLACTIN LEVEL (OLG): 10 NG/ML (ref 5.18–26.53)
PROT SERPL-MCNC: 7.2 GM/DL (ref 6.4–8.3)
RBC # BLD AUTO: 4.27 X10(6)/MCL (ref 4.2–5.4)
SODIUM SERPL-SCNC: 140 MMOL/L (ref 136–145)
TRIGL SERPL-MCNC: 142 MG/DL (ref 37–140)
TSH SERPL-ACNC: 4.4 UIU/ML (ref 0.35–4.94)
VLDLC SERPL CALC-MCNC: 28 MG/DL
WBC # SPEC AUTO: 4.81 X10(3)/MCL (ref 4.5–11.5)

## 2024-02-26 PROCEDURE — 85025 COMPLETE CBC W/AUTO DIFF WBC: CPT

## 2024-02-26 PROCEDURE — 84443 ASSAY THYROID STIM HORMONE: CPT

## 2024-02-26 PROCEDURE — 36415 COLL VENOUS BLD VENIPUNCTURE: CPT

## 2024-02-26 PROCEDURE — 80053 COMPREHEN METABOLIC PANEL: CPT

## 2024-02-26 PROCEDURE — 84146 ASSAY OF PROLACTIN: CPT

## 2024-02-26 PROCEDURE — 80061 LIPID PANEL: CPT

## 2024-05-18 ENCOUNTER — HOSPITAL ENCOUNTER (EMERGENCY)
Facility: HOSPITAL | Age: 49
Discharge: HOME | End: 2024-05-19
Attending: EMERGENCY MEDICINE
Payer: MEDICARE

## 2024-05-18 DIAGNOSIS — R10.9 ABDOMINAL PAIN, UNSPECIFIED ABDOMINAL LOCATION: Primary | ICD-10-CM

## 2024-05-18 LAB
ALBUMIN SERPL-MCNC: 3.9 G/DL (ref 3.5–5)
ALBUMIN/GLOB SERPL: 1 RATIO (ref 1.1–2)
ALP SERPL-CCNC: 77 UNIT/L (ref 40–150)
ALT SERPL-CCNC: 25 UNIT/L (ref 0–55)
ANION GAP SERPL CALC-SCNC: 9 MEQ/L
AST SERPL-CCNC: 16 UNIT/L (ref 5–34)
B-HCG SERPL QL: NEGATIVE
BASOPHILS # BLD AUTO: 0.04 X10(3)/MCL
BASOPHILS NFR BLD AUTO: 0.5 %
BILIRUB SERPL-MCNC: 0.4 MG/DL
BILIRUB UR QL STRIP.AUTO: NEGATIVE
BUN SERPL-MCNC: 16 MG/DL (ref 7–18.7)
CALCIUM SERPL-MCNC: 9.5 MG/DL (ref 8.4–10.2)
CHLORIDE SERPL-SCNC: 106 MMOL/L (ref 98–107)
CLARITY UR: CLEAR
CO2 SERPL-SCNC: 25 MMOL/L (ref 22–29)
COLOR UR AUTO: YELLOW
CREAT SERPL-MCNC: 1.11 MG/DL (ref 0.55–1.02)
CREAT/UREA NIT SERPL: 14
EOSINOPHIL # BLD AUTO: 0.14 X10(3)/MCL (ref 0–0.9)
EOSINOPHIL NFR BLD AUTO: 1.7 %
ERYTHROCYTE [DISTWIDTH] IN BLOOD BY AUTOMATED COUNT: 13.4 % (ref 11.5–17)
GFR SERPLBLD CREATININE-BSD FMLA CKD-EPI: >60 ML/MIN/1.73/M2
GLOBULIN SER-MCNC: 3.8 GM/DL (ref 2.4–3.5)
GLUCOSE SERPL-MCNC: 101 MG/DL (ref 74–100)
GLUCOSE UR QL STRIP: NEGATIVE
HCT VFR BLD AUTO: 39.6 % (ref 37–47)
HGB BLD-MCNC: 12.6 G/DL (ref 12–16)
HGB UR QL STRIP: NEGATIVE
IMM GRANULOCYTES # BLD AUTO: 0.02 X10(3)/MCL (ref 0–0.04)
IMM GRANULOCYTES NFR BLD AUTO: 0.2 %
KETONES UR QL STRIP: NEGATIVE
LEUKOCYTE ESTERASE UR QL STRIP: NEGATIVE
LIPASE SERPL-CCNC: 17 U/L
LYMPHOCYTES # BLD AUTO: 3.22 X10(3)/MCL (ref 0.6–4.6)
LYMPHOCYTES NFR BLD AUTO: 39.9 %
MCH RBC QN AUTO: 29 PG (ref 27–31)
MCHC RBC AUTO-ENTMCNC: 31.8 G/DL (ref 33–36)
MCV RBC AUTO: 91.2 FL (ref 80–94)
MONOCYTES # BLD AUTO: 0.54 X10(3)/MCL (ref 0.1–1.3)
MONOCYTES NFR BLD AUTO: 6.7 %
NEUTROPHILS # BLD AUTO: 4.12 X10(3)/MCL (ref 2.1–9.2)
NEUTROPHILS NFR BLD AUTO: 51 %
NITRITE UR QL STRIP: NEGATIVE
PH UR STRIP: 7 [PH]
PLATELET # BLD AUTO: 262 X10(3)/MCL (ref 130–400)
PMV BLD AUTO: 10.8 FL (ref 7.4–10.4)
POTASSIUM SERPL-SCNC: 4 MMOL/L (ref 3.5–5.1)
PROT SERPL-MCNC: 7.7 GM/DL (ref 6.4–8.3)
PROT UR QL STRIP: NEGATIVE
RBC # BLD AUTO: 4.34 X10(6)/MCL (ref 4.2–5.4)
SODIUM SERPL-SCNC: 140 MMOL/L (ref 136–145)
SP GR UR STRIP.AUTO: 1.01 (ref 1–1.03)
UROBILINOGEN UR STRIP-ACNC: 0.2
WBC # SPEC AUTO: 8.08 X10(3)/MCL (ref 4.5–11.5)

## 2024-05-18 PROCEDURE — 96361 HYDRATE IV INFUSION ADD-ON: CPT

## 2024-05-18 PROCEDURE — 80053 COMPREHEN METABOLIC PANEL: CPT | Performed by: EMERGENCY MEDICINE

## 2024-05-18 PROCEDURE — 83690 ASSAY OF LIPASE: CPT | Performed by: EMERGENCY MEDICINE

## 2024-05-18 PROCEDURE — 84703 CHORIONIC GONADOTROPIN ASSAY: CPT | Performed by: EMERGENCY MEDICINE

## 2024-05-18 PROCEDURE — 96375 TX/PRO/DX INJ NEW DRUG ADDON: CPT

## 2024-05-18 PROCEDURE — 85025 COMPLETE CBC W/AUTO DIFF WBC: CPT | Performed by: EMERGENCY MEDICINE

## 2024-05-18 PROCEDURE — 81003 URINALYSIS AUTO W/O SCOPE: CPT | Performed by: EMERGENCY MEDICINE

## 2024-05-18 PROCEDURE — 63600175 PHARM REV CODE 636 W HCPCS: Performed by: EMERGENCY MEDICINE

## 2024-05-18 PROCEDURE — 99285 EMERGENCY DEPT VISIT HI MDM: CPT | Mod: 25

## 2024-05-18 PROCEDURE — 25000003 PHARM REV CODE 250: Performed by: EMERGENCY MEDICINE

## 2024-05-18 PROCEDURE — 96374 THER/PROPH/DIAG INJ IV PUSH: CPT

## 2024-05-18 RX ORDER — ONDANSETRON HYDROCHLORIDE 2 MG/ML
4 INJECTION, SOLUTION INTRAVENOUS
Status: COMPLETED | OUTPATIENT
Start: 2024-05-18 | End: 2024-05-18

## 2024-05-18 RX ORDER — MORPHINE SULFATE 4 MG/ML
4 INJECTION, SOLUTION INTRAMUSCULAR; INTRAVENOUS
Status: COMPLETED | OUTPATIENT
Start: 2024-05-18 | End: 2024-05-18

## 2024-05-18 RX ADMIN — SODIUM CHLORIDE 1000 ML: 9 INJECTION, SOLUTION INTRAVENOUS at 11:05

## 2024-05-18 RX ADMIN — ONDANSETRON 4 MG: 2 INJECTION INTRAMUSCULAR; INTRAVENOUS at 11:05

## 2024-05-18 RX ADMIN — MORPHINE SULFATE 4 MG: 4 INJECTION, SOLUTION INTRAMUSCULAR; INTRAVENOUS at 11:05

## 2024-05-19 VITALS
WEIGHT: 293 LBS | HEART RATE: 78 BPM | TEMPERATURE: 98 F | DIASTOLIC BLOOD PRESSURE: 61 MMHG | OXYGEN SATURATION: 95 % | SYSTOLIC BLOOD PRESSURE: 133 MMHG | RESPIRATION RATE: 20 BRPM | HEIGHT: 63 IN | BODY MASS INDEX: 51.91 KG/M2

## 2024-05-19 PROCEDURE — 25500020 PHARM REV CODE 255: Performed by: EMERGENCY MEDICINE

## 2024-05-19 RX ORDER — ONDANSETRON 4 MG/1
4 TABLET, ORALLY DISINTEGRATING ORAL EVERY 8 HOURS PRN
Qty: 20 TABLET | Refills: 0 | Status: SHIPPED | OUTPATIENT
Start: 2024-05-19 | End: 2024-05-19

## 2024-05-19 RX ORDER — HYDROCODONE BITARTRATE AND ACETAMINOPHEN 5; 325 MG/1; MG/1
1 TABLET ORAL EVERY 6 HOURS PRN
Qty: 12 TABLET | Refills: 0 | Status: SHIPPED | OUTPATIENT
Start: 2024-05-19 | End: 2024-05-21

## 2024-05-19 RX ORDER — ONDANSETRON 4 MG/1
4 TABLET, ORALLY DISINTEGRATING ORAL EVERY 8 HOURS PRN
Qty: 20 TABLET | Refills: 0 | Status: SHIPPED | OUTPATIENT
Start: 2024-05-19

## 2024-05-19 RX ORDER — HYDROCODONE BITARTRATE AND ACETAMINOPHEN 5; 325 MG/1; MG/1
1 TABLET ORAL EVERY 6 HOURS PRN
Qty: 12 TABLET | Refills: 0 | Status: SHIPPED | OUTPATIENT
Start: 2024-05-19 | End: 2024-05-19

## 2024-05-19 RX ADMIN — IOPAMIDOL 100 ML: 755 INJECTION, SOLUTION INTRAVENOUS at 12:05

## 2024-05-19 NOTE — ED PROVIDER NOTES
Encounter Date: 2024       History     Chief Complaint   Patient presents with    Abdominal Pain     Abdominal pain that started about 1 week ago. The pain is worse on the left side and radiates down.     The patient is a 48-year-old female with a history of hypertension, GERD, previous CVA with resultant right-sided paresthesias, history of right nephrectomy secondary to cancer, who presents emergency department abdominal pain.  Patient states she has had abdominal pain for the past week.  She reports when initially started was on upper abdomen, states over the past day or so it is more moved to the left lower quadrant radiating around through to her back.  She reports some associated nausea and chills.  She denies any fever, actual vomiting, diarrhea, constipation, dysuria.  Patient does report that she has been taking Tylenol for her abdominal pain without significant improvement.  Patient otherwise has not sought any medical attention for this past week in regards to her abdominal pain.        Review of patient's allergies indicates:   Allergen Reactions    Penicillins      Past Medical History:   Diagnosis Date    Anxiety disorder, unspecified     Benign paroxysmal vertigo, bilateral     Bipolar disorder, unspecified     CVA (cerebral vascular accident)     Depression     GERD (gastroesophageal reflux disease)     Hypertension     Neuropathy      Past Surgical History:   Procedure Laterality Date     SECTION      CHOLECYSTECTOMY      TUBAL LIGATION       No family history on file.  Social History     Tobacco Use    Smoking status: Former     Types: Cigarettes    Smokeless tobacco: Never   Substance Use Topics    Alcohol use: Not Currently    Drug use: Never     Review of Systems   Constitutional:  Positive for chills. Negative for fever.   HENT:  Negative for sore throat.    Respiratory:  Negative for shortness of breath.    Cardiovascular:  Negative for chest pain.   Gastrointestinal:  Positive  for abdominal pain and nausea.   Genitourinary:  Negative for dysuria.   Musculoskeletal:  Negative for back pain.   Skin:  Negative for rash.   Neurological:  Negative for weakness.   Hematological:  Does not bruise/bleed easily.       Physical Exam     Initial Vitals [05/18/24 2227]   BP Pulse Resp Temp SpO2   (!) 164/85 77 18 98.4 °F (36.9 °C) 96 %      MAP       --         Physical Exam    Nursing note and vitals reviewed.  Constitutional: She is not diaphoretic. No distress.   Morbidly obese   HENT:   Head: Normocephalic and atraumatic.   Mouth/Throat: Oropharynx is clear and moist.   Eyes: Conjunctivae and EOM are normal. Pupils are equal, round, and reactive to light.   Neck: Neck supple. No JVD present.   Normal range of motion.  Cardiovascular:  Normal rate, regular rhythm and normal heart sounds.           No murmur heard.  Pulmonary/Chest: Breath sounds normal. No respiratory distress. She has no wheezes. She has no rhonchi.   Abdominal: Abdomen is soft. Bowel sounds are normal. There is no abdominal tenderness.   Hyperactive bowel sounds, Generalized tenderness to deep palpation, more so in left upper quadrant/left lower quadrant There is no rebound and no guarding.   Musculoskeletal:         General: Normal range of motion.      Cervical back: Normal range of motion and neck supple.     Neurological: She is alert and oriented to person, place, and time. She has normal strength. No sensory deficit. GCS score is 15. GCS eye subscore is 4. GCS verbal subscore is 5. GCS motor subscore is 6.   Skin: Skin is warm and dry. Capillary refill takes less than 2 seconds.   Psychiatric: She has a normal mood and affect. Her behavior is normal. Judgment and thought content normal.         ED Course   Procedures  Labs Reviewed   COMPREHENSIVE METABOLIC PANEL - Abnormal; Notable for the following components:       Result Value    Glucose 101 (*)     Creatinine 1.11 (*)     Globulin 3.8 (*)     Albumin/Globulin Ratio  1.0 (*)     All other components within normal limits   CBC WITH DIFFERENTIAL - Abnormal; Notable for the following components:    MCHC 31.8 (*)     MPV 10.8 (*)     All other components within normal limits   LIPASE - Normal   URINALYSIS, REFLEX TO URINE CULTURE - Normal   HCG, SERUM, QUALITATIVE - Normal   CBC W/ AUTO DIFFERENTIAL    Narrative:     The following orders were created for panel order CBC auto differential.  Procedure                               Abnormality         Status                     ---------                               -----------         ------                     CBC with Differential[[id number]]       Abnormal            Final result                 Please view results for these tests on the individual orders.          Imaging Results              CT Abdomen Pelvis With IV Contrast NO Oral Contrast (Preliminary result)  Result time 05/19/24 01:19:30      Preliminary result by Aamir Wu MD (05/19/24 01:19:30)                   Narrative:    START OF REPORT:  Technique: CT of the abdomen and pelvis was performed with axial images as well as sagittal and coronal reconstruction images with intravenous contrast.    Comparison: None available.    Clinical History: Abd pain.    Dosage Information: Automated Exposure Control was utilized 1338.28 mGy.cm.    Findings:  Lines and Tubes: None.  Thorax: Chronic right lower rib fractures are seen.  Lungs: There is minimal nonspecific dependent change at the lung bases.  Pleura: No effusions or thickening are seen. No pneumothorax is seen in the visualized lung bases.  Heart: The heart appears somewhat prominent.  Abdomen:  Abdominal Wall: Post-surgical changes are seen in the lower midline of the anterior abdominal wall. There is diastasis recti with mild uncomplicated protrusion of small bowel loops. The rest of the abdominal wall appears unremarkable.  Liver: The liver appears unremarkable. A Riedel lobe, normal variant, is present.  Biliary  System: No intrahepatic or extrahepatic biliary duct dilatation is seen.  Gallbladder: Surgical clips are seen in the gallbladder fossa which may reflect prior cholecystectomy correlate with surgical history and visual inspection.  Pancreas: The pancreas appears unremarkable. There is fatty of the pancreas.  Adrenals: The adrenal glands appear unremarkable.  Kidneys: The left kidney appears unremarkable with no stones cysts masses or hydronephrosis. The right kidney is not identified with surgical material in the right renal fossa.  Aorta: The abdominal aorta appears unremarkable.  IVC: Unremarkable.  Bowel:  Esophagus: The visualized esophagus appears unremarkable.  Stomach: The stomach appears unremarkable.  Duodenum: Unremarkable appearing duodenum.  Small Bowel: The small bowel appears unremarkable.  Colon: Nondistended.  Appendix: The appendix appears unremarkable and is seen on Image 95, Series 2 through Image 113, Series 2.  Peritoneum: No intraperitoneal free air or ascites is seen.    Pelvis:  Bladder: The bladder appears unremarkable.  Female:  Uterus: The uterus is globular in appearance. The endometrial cavity is distended with fluid. These are of indeterminate etiology.  Ovaries: The ovaries appear unremarkable with probable dominant right sided physiologic cysts.    Bony structures: There are multiple chronic non-united right posterior rib fractures.  Dorsal Spine: There is mild multilevel spondylosis of the visualized dorsal spine.  Bony Pelvis: The visualized bony structures of the pelvis appear unremarkable.      Impression:  1. The uterus is globular in appearance. The endometrial cavity is distended with fluid. These are of indeterminate etiology. Correlate with clinical and laboratory findings as regards additional evaluation and follow-up. Ultrasound correlation is also suggested.                                         Medications   sodium chloride 0.9% bolus 1,000 mL 1,000 mL (0 mLs  Intravenous Stopped 5/19/24 0032)   morphine injection 4 mg (4 mg Intravenous Given 5/18/24 2333)   ondansetron injection 4 mg (4 mg Intravenous Given 5/18/24 2333)   iopamidoL (ISOVUE-370) injection 100 mL (100 mLs Intravenous Given 5/19/24 0040)     Medical Decision Making  Amount and/or Complexity of Data Reviewed  Labs: ordered.  Radiology: ordered.    Risk  Prescription drug management.               ED Course as of 05/19/24 0137   Sun May 19, 2024   0133 Patient states that she was feeling much better.  Discussed labs and imaging with her and her  including abnormal uterus that could be fibroids.  She states that she does have an OBGYN, but has not seen the physician and awhile but can call to make a follow-up appointment.  I told her that she would need an ultrasound for better assessment.  Otherwise we will discharge patient home with symptomatic treatment.  She was agreeable to plan. [AA]      ED Course User Index  [AA] Allyssa Contreras MD                           Clinical Impression:  Final diagnoses:  [R10.9] Abdominal pain, unspecified abdominal location (Primary)          ED Disposition Condition    Discharge Stable          ED Prescriptions       Medication Sig Dispense Start Date End Date Auth. Provider    HYDROcodone-acetaminophen (NORCO) 5-325 mg per tablet Take 1 tablet by mouth every 6 (six) hours as needed for Pain. 12 tablet 5/19/2024 -- Allyssa Contreras MD    ondansetron (ZOFRAN-ODT) 4 MG TbDL Take 1 tablet (4 mg total) by mouth every 8 (eight) hours as needed (Nausea/vomiting). 20 tablet 5/19/2024 -- Allyssa Contreras MD          Follow-up Information       Follow up With Specialties Details Why Contact Info    Jenaro Alvarado MD Family Medicine   [address] [place] [id number] [phone]      Call your OB/Gyn on Monday to arrange follow up.                 Allyssa Contreras MD  05/19/24 0137

## 2024-05-21 ENCOUNTER — HOSPITAL ENCOUNTER (OUTPATIENT)
Dept: RADIOLOGY | Facility: HOSPITAL | Age: 49
Discharge: HOME | End: 2024-05-21
Attending: OBSTETRICS & GYNECOLOGY
Payer: MEDICARE

## 2024-05-21 ENCOUNTER — OFFICE VISIT (OUTPATIENT)
Dept: OBSTETRICS AND GYNECOLOGY | Facility: CLINIC | Age: 49
End: 2024-05-21
Payer: MEDICARE

## 2024-05-21 VITALS
HEART RATE: 97 BPM | HEIGHT: 63 IN | BODY MASS INDEX: 51.91 KG/M2 | SYSTOLIC BLOOD PRESSURE: 134 MMHG | RESPIRATION RATE: 17 BRPM | OXYGEN SATURATION: 99 % | DIASTOLIC BLOOD PRESSURE: 80 MMHG | WEIGHT: 293 LBS

## 2024-05-21 DIAGNOSIS — N85.7 HEMATOMETRA: ICD-10-CM

## 2024-05-21 DIAGNOSIS — R10.2 PELVIC PAIN: ICD-10-CM

## 2024-05-21 DIAGNOSIS — R10.2 PELVIC PAIN: Primary | ICD-10-CM

## 2024-05-21 LAB
B-HCG UR QL: NEGATIVE
CTP QC/QA: YES

## 2024-05-21 PROCEDURE — 81025 URINE PREGNANCY TEST: CPT | Mod: RHCUB | Performed by: OBSTETRICS & GYNECOLOGY

## 2024-05-21 PROCEDURE — 76856 US EXAM PELVIC COMPLETE: CPT | Mod: TC

## 2024-05-21 PROCEDURE — 99203 OFFICE O/P NEW LOW 30 MIN: CPT | Mod: 25,,, | Performed by: OBSTETRICS & GYNECOLOGY

## 2024-05-21 PROCEDURE — 58100 BIOPSY OF UTERUS LINING: CPT | Mod: 52,,, | Performed by: OBSTETRICS & GYNECOLOGY

## 2024-05-21 RX ORDER — IBUPROFEN 200 MG
1 CAPSULE ORAL DAILY
COMMUNITY
Start: 2024-02-23 | End: 2025-02-22

## 2024-05-21 RX ORDER — ERGOCALCIFEROL 1.25 MG/1
50000 CAPSULE ORAL
COMMUNITY

## 2024-05-21 RX ORDER — MECLIZINE HYDROCHLORIDE 25 MG/1
12.5-25 TABLET ORAL EVERY 8 HOURS PRN
COMMUNITY

## 2024-05-21 RX ORDER — GABAPENTIN 300 MG/1
300 CAPSULE ORAL
COMMUNITY

## 2024-05-21 RX ORDER — QUETIAPINE FUMARATE 100 MG/1
100 TABLET, FILM COATED ORAL DAILY
COMMUNITY

## 2024-05-21 RX ORDER — HYDROCODONE BITARTRATE AND ACETAMINOPHEN 7.5; 325 MG/1; MG/1
1 TABLET ORAL EVERY 6 HOURS PRN
Qty: 30 TABLET | Refills: 0 | Status: SHIPPED | OUTPATIENT
Start: 2024-05-21

## 2024-05-21 RX ORDER — KETOROLAC TROMETHAMINE 10 MG/1
10 TABLET, FILM COATED ORAL EVERY 6 HOURS
Qty: 20 TABLET | Refills: 0 | Status: SHIPPED | OUTPATIENT
Start: 2024-05-21 | End: 2024-05-26

## 2024-05-21 NOTE — PROCEDURES
Endometrial biopsy    Date/Time: 5/21/2024 2:00 PM    Performed by: Johnathan Grimes MD  Authorized by: Johnathan Grimes MD    Consent:     Consent obtained:  Prior to procedure the appropriate consent was completed and verified    Consent given by:  Patient    Patient questions answered: yes      Patient agrees, verbalizes understanding, and wants to proceed: yes    Indication:     Indications: Other disorder of menstruation and other abnormal bleeding from female genital tract    Pre-procedure:     Pre-procedure timeout performed: yes    Procedure:     Procedure: endometrial biopsy with Pipelle      Cervix cleaned and prepped: yes      A paracervical block was performed: no      An intracervical block was performed: no      The cervix was dilated: yes      Uterus sounded: no      Uterus sound depth (cm):  0    Patient tolerated procedure well with no complications: yes    Comments:     Procedure comments:  THE PIPETTE COULD NOT BE ADVANCED BEYOND THE CERVIX.

## 2024-05-21 NOTE — PROGRESS NOTES
Patient ID: [id number]   Chief Complaint: Pelvic Pain   HPI:   Linda Lorenz is a 48 y.o.  here today for Pelvic Pain   C/O pelvic pain x 1 week that's worsening . Denies any vaginal bleeding . CT done at [facility] 2024. Results scanned into chart. The patient has a history of menorrhagia with BTL and endometrial ablation.  CT reviewed and it appears there is a hematometra noted. This was explained in detail to the patient.   She is currently taking Norco 5 for pain.  Discussed ultrasound and referral to GYN/ONC as there is concern for endometrial cancer.   Patient has a history of renal cell cancer.   Discussed attempting cervical dilation and evacuation of cavity in clinic. She agrees.      No LMP recorded. Patient has had an ablation.  Past Medical History:  has a past medical history of Anxiety disorder, unspecified, Benign paroxysmal vertigo, bilateral, Bipolar disorder, unspecified, CVA (cerebral vascular accident), Depression, GERD (gastroesophageal reflux disease), Hypertension, Neuropathy, and Renal cancer, right.  Surgical History:  has a past surgical history that includes Cholecystectomy; Tubal ligation;  section; Ablation; and Nephrectomy (Right).  Family History: family history includes Diabetes in her father and mother; Hypertension in her father and mother.  Social History:  reports that she has quit smoking. Her smoking use included cigarettes. She has been exposed to tobacco smoke. She has never used smokeless tobacco. She reports that she does not currently use alcohol. She reports that she does not use drugs.  Current Outpatient Medications   Medication Sig Dispense Refill    buPROPion (WELLBUTRIN SR) 150 MG TBSR 12 hr tablet Take 150 mg by mouth 2 (two) times daily.      busPIRone (BUSPAR) 10 MG tablet Take 10 mg by mouth once daily.      busPIRone (BUSPAR) 15 MG tablet Take 1 tablet by mouth every evening.      calcium citrate (CALCITRATE) 200 mg (950 mg)  tablet Take 1 tablet by mouth once daily.      ergocalciferol (ERGOCALCIFEROL) 50,000 unit Cap Take 50,000 Units by mouth every 7 days.      furosemide (LASIX) 80 MG tablet       gabapentin (NEURONTIN) 300 MG capsule Take 300 mg by mouth. 300mg in am 600mg at noon and hs      meclizine (ANTIVERT) 25 mg tablet Take 12.5-25 mg by mouth every 8 (eight) hours as needed.      ondansetron (ZOFRAN-ODT) 4 MG TbDL Take 1 tablet (4 mg total) by mouth every 8 (eight) hours as needed (Nausea/vomiting). 20 tablet 0    pantoprazole (PROTONIX) 40 MG tablet       potassium chloride (MICRO-K) 10 MEQ CpSR       propranoloL (INDERAL) 10 MG tablet Take 1 tablet (10 mg total) by mouth 2 (two) times daily. 60 tablet 2    QUEtiapine (SEROQUEL) 100 MG Tab Take 100 mg by mouth once daily.      aspirin 81 mg Cap 81 mg.      folic acid (FOLVITE) 1 MG tablet  (Patient not taking: Reported on 5/21/2024)      LIDOcaine (LIDODERM) 5 % Place 1 patch onto the skin once daily. Remove & Discard patch within 12 hours or as directed by MD (Patient not taking: Reported on 5/21/2024) 7 patch 0    losartan (COZAAR) 100 MG tablet Take 1 tablet (100 mg total) by mouth once daily. 90 tablet 3     No current facility-administered medications for this visit.     Patient is allergic to penicillins.  MENARCHEAL:  Ablation 2018  PAP:  Last PAP: 2018    History of Abnormal PAP Smear: NO  Treated: n/a  HPV Vaccine: NO  INTERCOURSE:  Dyspareunia: No  Postcoital Bleeding: No  History of STI: No   If yes, then: No   Current Birth Control Method: ablation  Sexually Active: yes  BREAST HISTORY:   Last Mammogram: 2022  History of Abnormal Mammogram: NO    COLONOSCOPY:  Last Colonoscopy:   03/2024    Recent Results (from the past 24 hour(s))   POCT Urine Pregnancy    Collection Time: 05/21/24  2:08 PM   Result Value Ref Range    POC Preg Test, Ur Negative Negative     Acceptable Yes        Subjective:   Review of Systems  12 point review of systems  "conducted, negative except as stated in the history of present illness. See HPI for details.  Objective:     Visit Vitals  /80 (BP Location: Right arm)   Pulse 97   Resp 17   Ht 5' 3" (1.6 m)   Wt (!) 169.5 kg (373 lb 9.6 oz)   SpO2 99%   BMI 66.18 kg/m²     Recent Results (from the past 24 hour(s))   POCT Urine Pregnancy    Collection Time: 05/21/24  2:08 PM   Result Value Ref Range    POC Preg Test, Ur Negative Negative     Acceptable Yes      Physical Exam  Constitutional:  General Appearance : alert, in no acute distress, normal, well nourished.  Neck/Thyroid:  Inspection/Palpation: normal. Thyroid: normal size and shape.  Respiratory:  Respiratory Effort: normal.  Gastrointestinal:  Abdomen: no masses. no tender, nondistended.  Liver and spleen: normal  Hernias: no hernias present, no inguinal adenopathy.  Genitourinary:  External Genitalia: normal, no lesions.  Vagina: normal appearance, no abnormal discharge, no lesions.  Bladder: no mass, nontender.  Urethra: no erythema or lesions present.  Cervix: no lesions, non tender. (See procedure note)  Uterus: nontender, normal contour, normal mobility, normal size.   Adnexa: no masses, no tenderness.  Anus and Perineum: visually normal.   Chaperone Present  Assessment:       ICD-10-CM ICD-9-CM   1. Pelvic pain  R10.2 [id number]   2. Hematometra  N85.7 621.4     Plan   Pelvic pain  -     Cancel: US Pelvis Complete Non OB; Future; Expected date: 05/21/2024  -     POCT Urine Pregnancy  -     US Pelvis Limited Non OB  -     US Pelvis Complete Non OB; Future; Expected date: 05/21/2024  -     HYDROcodone-acetaminophen (NORCO) 7.5-325 mg per tablet; Take 1 tablet by mouth every 6 (six) hours as needed for Pain.  Dispense: 30 tablet; Refill: 0  -     ketorolac (TORADOL) 10 mg tablet; Take 1 tablet (10 mg total) by mouth every 6 (six) hours. for 5 days  Dispense: 20 tablet; Refill: 0    Hematometra    Will refer to GYN/ONC FOR EVAL.    Follow up in about 2 " weeks (around 6/4/2024) for RESULTS. In addition to their scheduled follow up, the patient has also been instructed to follow up on as needed basis.     RAUL PIZARRO MD

## 2024-05-22 ENCOUNTER — TELEPHONE (OUTPATIENT)
Dept: OBSTETRICS AND GYNECOLOGY | Facility: CLINIC | Age: 49
End: 2024-05-22
Payer: MEDICARE

## 2024-09-09 ENCOUNTER — HOSPITAL ENCOUNTER (EMERGENCY)
Facility: HOSPITAL | Age: 49
Discharge: HOME | End: 2024-09-09
Attending: INTERNAL MEDICINE
Payer: MEDICARE

## 2024-09-09 VITALS
OXYGEN SATURATION: 95 % | HEIGHT: 68 IN | DIASTOLIC BLOOD PRESSURE: 99 MMHG | WEIGHT: 293 LBS | BODY MASS INDEX: 44.41 KG/M2 | SYSTOLIC BLOOD PRESSURE: 167 MMHG | HEART RATE: 93 BPM | RESPIRATION RATE: 18 BRPM | TEMPERATURE: 98 F

## 2024-09-09 DIAGNOSIS — R10.84 GENERALIZED ABDOMINAL PAIN: Primary | ICD-10-CM

## 2024-09-09 DIAGNOSIS — K59.00 CONSTIPATION, UNSPECIFIED CONSTIPATION TYPE: ICD-10-CM

## 2024-09-09 DIAGNOSIS — F41.9 ANXIETY: ICD-10-CM

## 2024-09-09 LAB
ALBUMIN SERPL-MCNC: 3.9 G/DL (ref 3.5–5)
ALBUMIN/GLOB SERPL: 1 RATIO (ref 1.1–2)
ALP SERPL-CCNC: 88 UNIT/L (ref 40–150)
ALT SERPL-CCNC: 25 UNIT/L (ref 0–55)
AMYLASE SERPL-CCNC: 77 UNIT/L (ref 25–125)
ANION GAP SERPL CALC-SCNC: 11 MEQ/L
AST SERPL-CCNC: 17 UNIT/L (ref 5–34)
BASOPHILS # BLD AUTO: 0.04 X10(3)/MCL
BASOPHILS NFR BLD AUTO: 0.5 %
BILIRUB SERPL-MCNC: 0.3 MG/DL
BILIRUB UR QL STRIP.AUTO: NEGATIVE
BUN SERPL-MCNC: 17 MG/DL (ref 7–18.7)
CALCIUM SERPL-MCNC: 9.4 MG/DL (ref 8.4–10.2)
CHLORIDE SERPL-SCNC: 108 MMOL/L (ref 98–107)
CLARITY UR: CLEAR
CO2 SERPL-SCNC: 24 MMOL/L (ref 22–29)
COLOR UR AUTO: YELLOW
CREAT SERPL-MCNC: 1.13 MG/DL (ref 0.55–1.02)
CREAT/UREA NIT SERPL: 15
EOSINOPHIL # BLD AUTO: 0.2 X10(3)/MCL (ref 0–0.9)
EOSINOPHIL NFR BLD AUTO: 2.3 %
ERYTHROCYTE [DISTWIDTH] IN BLOOD BY AUTOMATED COUNT: 13.4 % (ref 11.5–17)
GFR SERPLBLD CREATININE-BSD FMLA CKD-EPI: 60 ML/MIN/1.73/M2
GLOBULIN SER-MCNC: 4 GM/DL (ref 2.4–3.5)
GLUCOSE SERPL-MCNC: 151 MG/DL (ref 74–100)
GLUCOSE UR QL STRIP: NEGATIVE
HCT VFR BLD AUTO: 41.4 % (ref 37–47)
HGB BLD-MCNC: 13.3 G/DL (ref 12–16)
HGB UR QL STRIP: NEGATIVE
IMM GRANULOCYTES # BLD AUTO: 0.04 X10(3)/MCL (ref 0–0.04)
IMM GRANULOCYTES NFR BLD AUTO: 0.5 %
KETONES UR QL STRIP: NEGATIVE
LACTATE SERPL-SCNC: 2 MMOL/L (ref 0.5–2.2)
LEUKOCYTE ESTERASE UR QL STRIP: NEGATIVE
LIPASE SERPL-CCNC: 22 U/L
LYMPHOCYTES # BLD AUTO: 2.43 X10(3)/MCL (ref 0.6–4.6)
LYMPHOCYTES NFR BLD AUTO: 27.7 %
MCH RBC QN AUTO: 29.9 PG (ref 27–31)
MCHC RBC AUTO-ENTMCNC: 32.1 G/DL (ref 33–36)
MCV RBC AUTO: 93 FL (ref 80–94)
MONOCYTES # BLD AUTO: 0.51 X10(3)/MCL (ref 0.1–1.3)
MONOCYTES NFR BLD AUTO: 5.8 %
NEUTROPHILS # BLD AUTO: 5.54 X10(3)/MCL (ref 2.1–9.2)
NEUTROPHILS NFR BLD AUTO: 63.2 %
NITRITE UR QL STRIP: NEGATIVE
PH UR STRIP: 5.5 [PH]
PLATELET # BLD AUTO: 288 X10(3)/MCL (ref 130–400)
PMV BLD AUTO: 10.5 FL (ref 7.4–10.4)
POTASSIUM SERPL-SCNC: 4.2 MMOL/L (ref 3.5–5.1)
PROT SERPL-MCNC: 7.9 GM/DL (ref 6.4–8.3)
PROT UR QL STRIP: NEGATIVE
RBC # BLD AUTO: 4.45 X10(6)/MCL (ref 4.2–5.4)
SODIUM SERPL-SCNC: 143 MMOL/L (ref 136–145)
SP GR UR STRIP.AUTO: >=1.03 (ref 1–1.03)
UROBILINOGEN UR STRIP-ACNC: 0.2
WBC # BLD AUTO: 8.76 X10(3)/MCL (ref 4.5–11.5)

## 2024-09-09 PROCEDURE — 85025 COMPLETE CBC W/AUTO DIFF WBC: CPT | Performed by: INTERNAL MEDICINE

## 2024-09-09 PROCEDURE — 83690 ASSAY OF LIPASE: CPT | Performed by: INTERNAL MEDICINE

## 2024-09-09 PROCEDURE — 80053 COMPREHEN METABOLIC PANEL: CPT | Performed by: INTERNAL MEDICINE

## 2024-09-09 PROCEDURE — 99284 EMERGENCY DEPT VISIT MOD MDM: CPT | Mod: 25

## 2024-09-09 PROCEDURE — 96375 TX/PRO/DX INJ NEW DRUG ADDON: CPT

## 2024-09-09 PROCEDURE — 96374 THER/PROPH/DIAG INJ IV PUSH: CPT

## 2024-09-09 PROCEDURE — 82150 ASSAY OF AMYLASE: CPT | Performed by: INTERNAL MEDICINE

## 2024-09-09 PROCEDURE — 63600175 PHARM REV CODE 636 W HCPCS: Performed by: INTERNAL MEDICINE

## 2024-09-09 PROCEDURE — 81003 URINALYSIS AUTO W/O SCOPE: CPT | Performed by: INTERNAL MEDICINE

## 2024-09-09 PROCEDURE — 25000003 PHARM REV CODE 250: Performed by: INTERNAL MEDICINE

## 2024-09-09 PROCEDURE — 83605 ASSAY OF LACTIC ACID: CPT | Performed by: INTERNAL MEDICINE

## 2024-09-09 RX ORDER — LACTULOSE 10 G/15ML
20 SOLUTION ORAL
Status: COMPLETED | OUTPATIENT
Start: 2024-09-09 | End: 2024-09-09

## 2024-09-09 RX ORDER — PROCHLORPERAZINE EDISYLATE 5 MG/ML
10 INJECTION INTRAMUSCULAR; INTRAVENOUS
Status: COMPLETED | OUTPATIENT
Start: 2024-09-09 | End: 2024-09-09

## 2024-09-09 RX ORDER — KETOROLAC TROMETHAMINE 30 MG/ML
30 INJECTION, SOLUTION INTRAMUSCULAR; INTRAVENOUS
Status: COMPLETED | OUTPATIENT
Start: 2024-09-09 | End: 2024-09-09

## 2024-09-09 RX ADMIN — KETOROLAC TROMETHAMINE 30 MG: 30 INJECTION, SOLUTION INTRAMUSCULAR at 06:09

## 2024-09-09 RX ADMIN — LACTULOSE 20 G: 20 SOLUTION ORAL at 08:09

## 2024-09-09 RX ADMIN — PROCHLORPERAZINE EDISYLATE 10 MG: 5 INJECTION INTRAMUSCULAR; INTRAVENOUS at 06:09

## 2024-09-09 NOTE — ED PROVIDER NOTES
Encounter Date: 2024       History     Chief Complaint   Patient presents with    Abdominal Pain     Lower abd pain. Started in April, states is worse today. States pain radiates to back and down both legs. Ibuprofen taken at 1630     48-year-old white female with chronic generalized abdominal pain presents emergency department stating that it has gotten acutely worse since 4:00 p.m. this afternoon.  She admits to some nausea but denies vomiting diarrhea fever chills.  She states this all started back in April she had a CT scan done at that time after an ER visit but it was not had follow up other than seeing her gyn which time she had an ablation and an IUD placed but has not had any other workup done for her abdomen.      Review of patient's allergies indicates:   Allergen Reactions    Penicillins      Past Medical History:   Diagnosis Date    Anxiety disorder, unspecified     Benign paroxysmal vertigo, bilateral     Bipolar disorder, unspecified     CVA (cerebral vascular accident)     Depression     GERD (gastroesophageal reflux disease)     Hypertension     Neuropathy     Renal cancer, right      Past Surgical History:   Procedure Laterality Date    ABLATION       SECTION      CHOLECYSTECTOMY      NEPHRECTOMY Right     TUBAL LIGATION       Family History   Problem Relation Name Age of Onset    Diabetes Father      Hypertension Father      Diabetes Mother      Hypertension Mother       Social History     Tobacco Use    Smoking status: Former     Types: Cigarettes     Passive exposure: Past    Smokeless tobacco: Never   Substance Use Topics    Alcohol use: Not Currently    Drug use: Never     Review of Systems    Physical Exam     Initial Vitals [24 1816]   BP Pulse Resp Temp SpO2   (!) 183/89 94 (!) 22 98.2 °F (36.8 °C) 96 %      MAP       --         Physical Exam    Nursing note and vitals reviewed.  Constitutional: She appears well-developed and well-nourished. She is not diaphoretic. No  distress.   Morbidly obese, very anxious   HENT:   Head: Normocephalic and atraumatic.   Mouth/Throat: Oropharynx is clear and moist. No oropharyngeal exudate.   Eyes: Conjunctivae and EOM are normal. Pupils are equal, round, and reactive to light. No scleral icterus.   Neck: Neck supple. No JVD present.   Normal range of motion.  Cardiovascular:  Normal rate, regular rhythm, normal heart sounds and intact distal pulses.     Exam reveals no gallop and no friction rub.       No murmur heard.  Pulmonary/Chest: Breath sounds normal. No respiratory distress. She has no wheezes. She exhibits no tenderness.   Abdominal: Abdomen is soft. Bowel sounds are normal. She exhibits no distension. There is generalized abdominal tenderness. There is no rebound and no guarding.   Musculoskeletal:         General: Normal range of motion.      Cervical back: Normal range of motion and neck supple.     Neurological: She is alert and oriented to person, place, and time. She has normal strength.   Skin: Skin is warm and dry. Capillary refill takes less than 2 seconds.         ED Course   Procedures  Labs Reviewed   COMPREHENSIVE METABOLIC PANEL - Abnormal       Result Value    Sodium 143      Potassium 4.2      Chloride 108 (*)     CO2 24      Glucose 151 (*)     Blood Urea Nitrogen 17.0      Creatinine 1.13 (*)     Calcium 9.4      Protein Total 7.9      Albumin 3.9      Globulin 4.0 (*)     Albumin/Globulin Ratio 1.0 (*)     Bilirubin Total 0.3      ALP 88      ALT 25      AST 17      eGFR 60      Anion Gap 11.0      BUN/Creatinine Ratio 15     CBC WITH DIFFERENTIAL - Abnormal    WBC 8.76      RBC 4.45      Hgb 13.3      Hct 41.4      MCV 93.0      MCH 29.9      MCHC 32.1 (*)     RDW 13.4      Platelet 288      MPV 10.5 (*)     Neut % 63.2      Lymph % 27.7      Mono % 5.8      Eos % 2.3      Basophil % 0.5      Lymph # 2.43      Neut # 5.54      Mono # 0.51      Eos # 0.20      Baso # 0.04      IG# 0.04      IG% 0.5     URINALYSIS,  REFLEX TO URINE CULTURE - Normal    Color, UA Yellow      Appearance, UA Clear      Specific Gravity, UA >=1.030      pH, UA 5.5      Protein, UA Negative      Glucose, UA Negative      Ketones, UA Negative      Blood, UA Negative      Bilirubin, UA Negative      Urobilinogen, UA 0.2      Nitrites, UA Negative      Leukocyte Esterase, UA Negative     LACTIC ACID, PLASMA - Normal    Lactic Acid Level 2.0     AMYLASE - Normal    Amylase Level 77     LIPASE - Normal    Lipase Level 22     CBC W/ AUTO DIFFERENTIAL    Narrative:     The following orders were created for panel order CBC auto differential.  Procedure                               Abnormality         Status                     ---------                               -----------         ------                     CBC with Differential[[id number]]       Abnormal            Final result                 Please view results for these tests on the individual orders.          Imaging Results              X-Ray Abdomen Flat And Erect (Preliminary result)  Result time 09/09/24 19:59:01      Wet Read by Reid Renae MD (09/09/24 19:59:01, [facility] - Emergency Dept, Emergency Medicine)    Nonspecific bowel gas pattern with no evidence of obstruction or perforation.  Feces noted throughout consistent with constipation                                     Medications   lactulose 20 gram/30 mL solution Soln 20 g (has no administration in time range)   prochlorperazine injection Soln 10 mg (10 mg Intravenous Given 9/9/24 1858)   ketorolac injection 30 mg (30 mg Intravenous Given 9/9/24 1857)     Medical Decision Making  48-year-old morbidly obese white female presents with 5 month history of generalized abdominal pain.  Differential diagnosis include anxiety, malingering, constipation, pancreatitis, urinary tract infection, diverticulitis, gynecological pain.  Workup included blood work and an x-ray of her abdomen and urine.  Blood work and urine in  her completely normal in her x-ray is consistent with no evidence of perforation or obstruction however she has got a significant stool burden consistent with constipation.  For her anxiety and a nausea and abdominal pain I have given her some IV Compazine and this significantly relaxed her and she was sitting up at the bedside stating she was feeling much better.  Will give a dose of lactulose and I have explained to her she does not have a very large bowel movement by tonight or tomorrow take an over-the-counter laxative bottle and follow up with the primary care physician Dr. Alvarado as she has been having these issues for 5 months and has not seen her primary care for this but has been to the emergency department twice now    Problems Addressed:  Anxiety: chronic illness or injury  Constipation, unspecified constipation type: acute illness or injury  Generalized abdominal pain: chronic illness or injury    Amount and/or Complexity of Data Reviewed  Independent Historian: spouse  External Data Reviewed: labs, radiology and notes.  Labs: ordered. Decision-making details documented in ED Course.  Radiology: ordered and independent interpretation performed. Decision-making details documented in ED Course.    Risk  Prescription drug management.  Diagnosis or treatment significantly limited by social determinants of health.                                      Clinical Impression:  Final diagnoses:  [R10.84] Generalized abdominal pain (Primary)  [K59.00] Constipation, unspecified constipation type  [F41.9] Anxiety          ED Disposition Condition    Discharge Stable          ED Prescriptions    None       Follow-up Information       Follow up With Specialties Details Why Contact Info    Jenaro Alvarado MD Family Medicine In 3 days  [address] [id number] [id number]  [phone]            Garrison Mcdermott, RNRegistered Nurse was present during the entire interaction with this patient     Reid Renae,  MD  09/09/24 2002

## 2024-09-17 DIAGNOSIS — R10.84 ABDOMINAL PAIN, GENERALIZED: Primary | ICD-10-CM

## 2024-09-23 ENCOUNTER — HOSPITAL ENCOUNTER (OUTPATIENT)
Dept: RADIOLOGY | Facility: HOSPITAL | Age: 49
Discharge: HOME | End: 2024-09-23
Attending: FAMILY MEDICINE
Payer: MEDICARE

## 2024-09-23 DIAGNOSIS — R10.84 ABDOMINAL PAIN, GENERALIZED: ICD-10-CM

## 2024-09-23 PROCEDURE — 25500020 PHARM REV CODE 255: Performed by: FAMILY MEDICINE

## 2024-09-23 PROCEDURE — 74177 CT ABD & PELVIS W/CONTRAST: CPT | Mod: TC

## 2024-09-23 RX ORDER — DIATRIZOATE MEGLUMINE AND DIATRIZOATE SODIUM 660; 100 MG/ML; MG/ML
30 SOLUTION ORAL; RECTAL
Status: COMPLETED | OUTPATIENT
Start: 2024-09-23 | End: 2024-09-23

## 2024-09-23 RX ORDER — IOPAMIDOL 755 MG/ML
100 INJECTION, SOLUTION INTRAVASCULAR
Status: COMPLETED | OUTPATIENT
Start: 2024-09-23 | End: 2024-09-23

## 2024-09-23 RX ADMIN — IOPAMIDOL 100 ML: 755 INJECTION, SOLUTION INTRAVENOUS at 10:09

## 2024-09-23 RX ADMIN — DIATRIZOATE MEGLUMINE AND DIATRIZOATE SODIUM 30 ML: 660; 100 LIQUID ORAL; RECTAL at 10:09

## 2024-12-09 ENCOUNTER — LAB VISIT (OUTPATIENT)
Dept: LAB | Facility: HOSPITAL | Age: 49
End: 2024-12-09
Attending: FAMILY MEDICINE
Payer: MEDICARE

## 2024-12-09 DIAGNOSIS — I10 HYPERTENSION, UNSPECIFIED TYPE: Primary | ICD-10-CM

## 2024-12-09 LAB
ALBUMIN SERPL-MCNC: 3.6 G/DL (ref 3.5–5)
ALBUMIN/GLOB SERPL: 1 RATIO (ref 1.1–2)
ALP SERPL-CCNC: 63 UNIT/L (ref 40–150)
ALT SERPL-CCNC: 26 UNIT/L (ref 0–55)
ANION GAP SERPL CALC-SCNC: 7 MEQ/L
AST SERPL-CCNC: 21 UNIT/L (ref 5–34)
BASOPHILS # BLD AUTO: 0.03 X10(3)/MCL
BASOPHILS NFR BLD AUTO: 0.6 %
BILIRUB SERPL-MCNC: 0.4 MG/DL
BUN SERPL-MCNC: 24 MG/DL (ref 9.8–20.1)
CALCIUM SERPL-MCNC: 8.9 MG/DL (ref 8.4–10.2)
CHLORIDE SERPL-SCNC: 105 MMOL/L (ref 98–107)
CHOLEST SERPL-MCNC: 179 MG/DL
CHOLEST/HDLC SERPL: 5 {RATIO} (ref 0–5)
CO2 SERPL-SCNC: 28 MMOL/L (ref 22–29)
CREAT SERPL-MCNC: 0.91 MG/DL (ref 0.55–1.02)
CREAT/UREA NIT SERPL: 26
EOSINOPHIL # BLD AUTO: 0.13 X10(3)/MCL (ref 0–0.9)
EOSINOPHIL NFR BLD AUTO: 2.5 %
ERYTHROCYTE [DISTWIDTH] IN BLOOD BY AUTOMATED COUNT: 12.4 % (ref 11.5–17)
GFR SERPLBLD CREATININE-BSD FMLA CKD-EPI: >60 ML/MIN/1.73/M2
GLOBULIN SER-MCNC: 3.5 GM/DL (ref 2.4–3.5)
GLUCOSE SERPL-MCNC: 104 MG/DL (ref 74–100)
HCT VFR BLD AUTO: 36.1 % (ref 37–47)
HDLC SERPL-MCNC: 38 MG/DL (ref 35–60)
HGB BLD-MCNC: 11.5 G/DL (ref 12–16)
IMM GRANULOCYTES # BLD AUTO: 0.01 X10(3)/MCL (ref 0–0.04)
IMM GRANULOCYTES NFR BLD AUTO: 0.2 %
LDLC SERPL CALC-MCNC: 101 MG/DL (ref 50–140)
LYMPHOCYTES # BLD AUTO: 1.95 X10(3)/MCL (ref 0.6–4.6)
LYMPHOCYTES NFR BLD AUTO: 38.2 %
MCH RBC QN AUTO: 30.1 PG (ref 27–31)
MCHC RBC AUTO-ENTMCNC: 31.9 G/DL (ref 33–36)
MCV RBC AUTO: 94.5 FL (ref 80–94)
MONOCYTES # BLD AUTO: 0.37 X10(3)/MCL (ref 0.1–1.3)
MONOCYTES NFR BLD AUTO: 7.3 %
NEUTROPHILS # BLD AUTO: 2.61 X10(3)/MCL (ref 2.1–9.2)
NEUTROPHILS NFR BLD AUTO: 51.2 %
PLATELET # BLD AUTO: 229 X10(3)/MCL (ref 130–400)
PMV BLD AUTO: 10.3 FL (ref 7.4–10.4)
POTASSIUM SERPL-SCNC: 4.3 MMOL/L (ref 3.5–5.1)
PROT SERPL-MCNC: 7.1 GM/DL (ref 6.4–8.3)
RBC # BLD AUTO: 3.82 X10(6)/MCL (ref 4.2–5.4)
SODIUM SERPL-SCNC: 140 MMOL/L (ref 136–145)
TRIGL SERPL-MCNC: 199 MG/DL (ref 37–140)
TSH SERPL-ACNC: 3.14 UIU/ML (ref 0.35–4.94)
VLDLC SERPL CALC-MCNC: 40 MG/DL
WBC # BLD AUTO: 5.1 X10(3)/MCL (ref 4.5–11.5)

## 2024-12-09 PROCEDURE — 36415 COLL VENOUS BLD VENIPUNCTURE: CPT

## 2024-12-09 PROCEDURE — 80053 COMPREHEN METABOLIC PANEL: CPT

## 2024-12-09 PROCEDURE — 84443 ASSAY THYROID STIM HORMONE: CPT

## 2024-12-09 PROCEDURE — 80061 LIPID PANEL: CPT

## 2024-12-09 PROCEDURE — 85025 COMPLETE CBC W/AUTO DIFF WBC: CPT

## 2025-06-09 ENCOUNTER — LAB VISIT (OUTPATIENT)
Dept: LAB | Facility: HOSPITAL | Age: 50
End: 2025-06-09
Attending: NURSE PRACTITIONER
Payer: MEDICARE

## 2025-06-09 DIAGNOSIS — Z79.899 POLYPHARMACY: Primary | ICD-10-CM

## 2025-06-09 LAB
ALBUMIN SERPL-MCNC: 3.6 G/DL (ref 3.5–5)
ALBUMIN/GLOB SERPL: 0.9 RATIO (ref 1.1–2)
ALP SERPL-CCNC: 80 UNIT/L (ref 40–150)
ALT SERPL-CCNC: 39 UNIT/L (ref 0–55)
ANION GAP SERPL CALC-SCNC: 8 MEQ/L
AST SERPL-CCNC: 19 UNIT/L (ref 11–45)
BASOPHILS # BLD AUTO: 0.02 X10(3)/MCL
BASOPHILS NFR BLD AUTO: 0.3 %
BILIRUB SERPL-MCNC: 0.3 MG/DL
BUN SERPL-MCNC: 23 MG/DL (ref 9.8–20.1)
CALCIUM SERPL-MCNC: 9 MG/DL (ref 8.4–10.2)
CHLORIDE SERPL-SCNC: 104 MMOL/L (ref 98–107)
CHOLEST SERPL-MCNC: 182 MG/DL
CHOLEST/HDLC SERPL: 5 {RATIO} (ref 0–5)
CO2 SERPL-SCNC: 29 MMOL/L (ref 22–29)
CREAT SERPL-MCNC: 0.93 MG/DL (ref 0.55–1.02)
CREAT/UREA NIT SERPL: 25
EOSINOPHIL # BLD AUTO: 0.14 X10(3)/MCL (ref 0–0.9)
EOSINOPHIL NFR BLD AUTO: 2.4 %
ERYTHROCYTE [DISTWIDTH] IN BLOOD BY AUTOMATED COUNT: 13.6 % (ref 11.5–17)
EST. AVERAGE GLUCOSE BLD GHB EST-MCNC: 116.9 MG/DL
GFR SERPLBLD CREATININE-BSD FMLA CKD-EPI: >60 ML/MIN/1.73/M2
GLOBULIN SER-MCNC: 4 GM/DL (ref 2.4–3.5)
GLUCOSE SERPL-MCNC: 126 MG/DL (ref 74–100)
HBA1C MFR BLD: 5.7 %
HCT VFR BLD AUTO: 39.8 % (ref 37–47)
HDLC SERPL-MCNC: 40 MG/DL (ref 35–60)
HGB BLD-MCNC: 12.6 G/DL (ref 12–16)
IMM GRANULOCYTES # BLD AUTO: 0.03 X10(3)/MCL (ref 0–0.04)
IMM GRANULOCYTES NFR BLD AUTO: 0.5 %
LDLC SERPL CALC-MCNC: 114 MG/DL (ref 50–140)
LYMPHOCYTES # BLD AUTO: 2.17 X10(3)/MCL (ref 0.6–4.6)
LYMPHOCYTES NFR BLD AUTO: 37.8 %
MCH RBC QN AUTO: 29.6 PG (ref 27–31)
MCHC RBC AUTO-ENTMCNC: 31.7 G/DL (ref 33–36)
MCV RBC AUTO: 93.4 FL (ref 80–94)
MONOCYTES # BLD AUTO: 0.45 X10(3)/MCL (ref 0.1–1.3)
MONOCYTES NFR BLD AUTO: 7.8 %
NEUTROPHILS # BLD AUTO: 2.93 X10(3)/MCL (ref 2.1–9.2)
NEUTROPHILS NFR BLD AUTO: 51.2 %
NRBC BLD AUTO-RTO: 0 %
PLATELET # BLD AUTO: 226 X10(3)/MCL (ref 130–400)
PMV BLD AUTO: 10.4 FL (ref 7.4–10.4)
POTASSIUM SERPL-SCNC: 3.8 MMOL/L (ref 3.5–5.1)
PROT SERPL-MCNC: 7.6 GM/DL (ref 6.4–8.3)
RBC # BLD AUTO: 4.26 X10(6)/MCL (ref 4.2–5.4)
SODIUM SERPL-SCNC: 141 MMOL/L (ref 136–145)
TRIGL SERPL-MCNC: 140 MG/DL (ref 37–140)
TSH SERPL-ACNC: 2.9 UIU/ML (ref 0.35–4.94)
VLDLC SERPL CALC-MCNC: 28 MG/DL
WBC # BLD AUTO: 5.74 X10(3)/MCL (ref 4.5–11.5)

## 2025-06-09 PROCEDURE — 80053 COMPREHEN METABOLIC PANEL: CPT

## 2025-06-09 PROCEDURE — 85025 COMPLETE CBC W/AUTO DIFF WBC: CPT

## 2025-06-09 PROCEDURE — 84443 ASSAY THYROID STIM HORMONE: CPT

## 2025-06-09 PROCEDURE — 83036 HEMOGLOBIN GLYCOSYLATED A1C: CPT

## 2025-06-09 PROCEDURE — 80061 LIPID PANEL: CPT

## 2025-06-09 PROCEDURE — 36415 COLL VENOUS BLD VENIPUNCTURE: CPT

## 2025-06-27 ENCOUNTER — LAB VISIT (OUTPATIENT)
Dept: LAB | Facility: HOSPITAL | Age: 50
End: 2025-06-27
Attending: FAMILY MEDICINE
Payer: MEDICARE

## 2025-06-27 DIAGNOSIS — R73.9 BLOOD GLUCOSE ELEVATED: Primary | ICD-10-CM

## 2025-06-27 LAB
EST. AVERAGE GLUCOSE BLD GHB EST-MCNC: 114 MG/DL
GLUCOSE P FAST SERPL-MCNC: 117 MG/DL (ref 70–100)
HBA1C MFR BLD: 5.6 %

## 2025-06-27 PROCEDURE — 83036 HEMOGLOBIN GLYCOSYLATED A1C: CPT

## 2025-06-27 PROCEDURE — 82947 ASSAY GLUCOSE BLOOD QUANT: CPT

## 2025-06-27 PROCEDURE — 36415 COLL VENOUS BLD VENIPUNCTURE: CPT

## 2025-07-08 ENCOUNTER — LAB VISIT (OUTPATIENT)
Dept: LAB | Facility: HOSPITAL | Age: 50
End: 2025-07-08
Attending: FAMILY MEDICINE
Payer: MEDICARE

## 2025-07-08 DIAGNOSIS — R73.9 BLOOD GLUCOSE ELEVATED: Primary | ICD-10-CM

## 2025-07-08 LAB
GLUCOSE 1H P 100 G GLC PO SERPL-MCNC: 197 MG/DL (ref 100–180)
GLUCOSE 2H P 100 G GLC PO SERPL-MCNC: 169 MG/DL (ref 70–140)
GLUCOSE P FAST SERPL-MCNC: 115 MG/DL (ref 70–100)

## 2025-07-08 PROCEDURE — 82950 GLUCOSE TEST: CPT

## 2025-07-08 PROCEDURE — 36415 COLL VENOUS BLD VENIPUNCTURE: CPT

## 2025-07-08 PROCEDURE — 82947 ASSAY GLUCOSE BLOOD QUANT: CPT

## 2025-07-08 PROCEDURE — 82951 GLUCOSE TOLERANCE TEST (GTT): CPT

## 2025-08-06 ENCOUNTER — HOSPITAL ENCOUNTER (INPATIENT)
Facility: HOSPITAL | Age: 50
LOS: 1 days | Discharge: HOME | DRG: 398 | End: 2025-08-09
Attending: EMERGENCY MEDICINE | Admitting: SURGERY
Payer: MEDICARE

## 2025-08-06 DIAGNOSIS — Z01.818 PRE-OP EXAM: ICD-10-CM

## 2025-08-06 DIAGNOSIS — Z13.6 SCREENING FOR CARDIOVASCULAR CONDITION: ICD-10-CM

## 2025-08-06 DIAGNOSIS — K35.80 ACUTE APPENDICITIS, UNSPECIFIED ACUTE APPENDICITIS TYPE: Primary | ICD-10-CM

## 2025-08-06 LAB
ALBUMIN SERPL-MCNC: 3.7 G/DL (ref 3.5–5)
ALBUMIN/GLOB SERPL: 0.9 RATIO (ref 1.1–2)
ALP SERPL-CCNC: 71 UNIT/L (ref 40–150)
ALT SERPL-CCNC: 32 UNIT/L (ref 0–55)
ANION GAP SERPL CALC-SCNC: 10 MEQ/L
AST SERPL-CCNC: 20 UNIT/L (ref 11–45)
B-HCG UR QL: NEGATIVE
BACTERIA #/AREA URNS AUTO: ABNORMAL /HPF
BASOPHILS # BLD AUTO: 0.02 X10(3)/MCL
BASOPHILS NFR BLD AUTO: 0.2 %
BILIRUB SERPL-MCNC: 0.5 MG/DL
BILIRUB UR QL STRIP.AUTO: NEGATIVE
BUN SERPL-MCNC: 18 MG/DL (ref 9.8–20.1)
CALCIUM SERPL-MCNC: 9.3 MG/DL (ref 8.4–10.2)
CHLORIDE SERPL-SCNC: 104 MMOL/L (ref 98–107)
CLARITY UR: CLEAR
CO2 SERPL-SCNC: 27 MMOL/L (ref 22–29)
COLOR UR AUTO: YELLOW
CREAT SERPL-MCNC: 0.96 MG/DL (ref 0.55–1.02)
CREAT/UREA NIT SERPL: 19
EOSINOPHIL # BLD AUTO: 0.06 X10(3)/MCL (ref 0–0.9)
EOSINOPHIL NFR BLD AUTO: 0.5 %
ERYTHROCYTE [DISTWIDTH] IN BLOOD BY AUTOMATED COUNT: 13.4 % (ref 11.5–17)
GFR SERPLBLD CREATININE-BSD FMLA CKD-EPI: >60 ML/MIN/1.73/M2
GLOBULIN SER-MCNC: 4.1 GM/DL (ref 2.4–3.5)
GLUCOSE SERPL-MCNC: 145 MG/DL (ref 74–100)
GLUCOSE UR QL STRIP: NEGATIVE
HCT VFR BLD AUTO: 41.4 % (ref 37–47)
HGB BLD-MCNC: 13.4 G/DL (ref 12–16)
HGB UR QL STRIP: ABNORMAL
IMM GRANULOCYTES # BLD AUTO: 0.04 X10(3)/MCL (ref 0–0.04)
IMM GRANULOCYTES NFR BLD AUTO: 0.3 %
KETONES UR QL STRIP: NEGATIVE
LACTATE SERPL-SCNC: 2.2 MMOL/L (ref 0.5–2.2)
LACTATE SERPL-SCNC: 2.4 MMOL/L (ref 0.5–2.2)
LEUKOCYTE ESTERASE UR QL STRIP: ABNORMAL
LIPASE SERPL-CCNC: 18 U/L
LYMPHOCYTES # BLD AUTO: 0.99 X10(3)/MCL (ref 0.6–4.6)
LYMPHOCYTES NFR BLD AUTO: 8.4 %
MAGNESIUM SERPL-MCNC: 1.8 MG/DL (ref 1.6–2.6)
MCH RBC QN AUTO: 30 PG (ref 27–31)
MCHC RBC AUTO-ENTMCNC: 32.4 G/DL (ref 33–36)
MCV RBC AUTO: 92.6 FL (ref 80–94)
MONOCYTES # BLD AUTO: 0.55 X10(3)/MCL (ref 0.1–1.3)
MONOCYTES NFR BLD AUTO: 4.7 %
NEUTROPHILS # BLD AUTO: 10.11 X10(3)/MCL (ref 2.1–9.2)
NEUTROPHILS NFR BLD AUTO: 85.9 %
NITRITE UR QL STRIP: NEGATIVE
NRBC BLD AUTO-RTO: 0 %
NT-PROBNP SERPL-MCNC: 161 PG/ML
OHS QRS DURATION: 92 MS
OHS QTC CALCULATION: 442 MS
PH UR STRIP: 8.5 [PH]
PLATELET # BLD AUTO: 210 X10(3)/MCL (ref 130–400)
PMV BLD AUTO: 10.4 FL (ref 7.4–10.4)
POCT GLUCOSE: 140 MG/DL (ref 70–110)
POTASSIUM SERPL-SCNC: 4.1 MMOL/L (ref 3.5–5.1)
PROT SERPL-MCNC: 7.8 GM/DL (ref 6.4–8.3)
PROT UR QL STRIP: NEGATIVE
RBC # BLD AUTO: 4.47 X10(6)/MCL (ref 4.2–5.4)
RBC #/AREA URNS AUTO: ABNORMAL /HPF
SODIUM SERPL-SCNC: 141 MMOL/L (ref 136–145)
SP GR UR STRIP.AUTO: 1.01 (ref 1–1.03)
SQUAMOUS #/AREA URNS AUTO: ABNORMAL /HPF
UROBILINOGEN UR STRIP-ACNC: 1
WBC # BLD AUTO: 11.77 X10(3)/MCL (ref 4.5–11.5)
WBC #/AREA URNS AUTO: ABNORMAL /HPF

## 2025-08-06 PROCEDURE — 87040 BLOOD CULTURE FOR BACTERIA: CPT | Mod: 59 | Performed by: EMERGENCY MEDICINE

## 2025-08-06 PROCEDURE — 63600175 PHARM REV CODE 636 W HCPCS: Performed by: EMERGENCY MEDICINE

## 2025-08-06 PROCEDURE — 63600175 PHARM REV CODE 636 W HCPCS: Performed by: SURGERY

## 2025-08-06 PROCEDURE — 83605 ASSAY OF LACTIC ACID: CPT | Mod: 91 | Performed by: EMERGENCY MEDICINE

## 2025-08-06 PROCEDURE — 25000003 PHARM REV CODE 250: Performed by: SURGERY

## 2025-08-06 PROCEDURE — 96372 THER/PROPH/DIAG INJ SC/IM: CPT | Performed by: SURGERY

## 2025-08-06 PROCEDURE — G0378 HOSPITAL OBSERVATION PER HR: HCPCS

## 2025-08-06 PROCEDURE — 81001 URINALYSIS AUTO W/SCOPE: CPT | Performed by: EMERGENCY MEDICINE

## 2025-08-06 PROCEDURE — 81025 URINE PREGNANCY TEST: CPT | Performed by: SURGERY

## 2025-08-06 PROCEDURE — 93010 ELECTROCARDIOGRAM REPORT: CPT | Mod: ,,, | Performed by: INTERNAL MEDICINE

## 2025-08-06 PROCEDURE — 85025 COMPLETE CBC W/AUTO DIFF WBC: CPT | Performed by: EMERGENCY MEDICINE

## 2025-08-06 PROCEDURE — 94761 N-INVAS EAR/PLS OXIMETRY MLT: CPT

## 2025-08-06 PROCEDURE — 99285 EMERGENCY DEPT VISIT HI MDM: CPT | Mod: 25

## 2025-08-06 PROCEDURE — 96361 HYDRATE IV INFUSION ADD-ON: CPT

## 2025-08-06 PROCEDURE — 80053 COMPREHEN METABOLIC PANEL: CPT | Performed by: EMERGENCY MEDICINE

## 2025-08-06 PROCEDURE — 25500020 PHARM REV CODE 255: Performed by: EMERGENCY MEDICINE

## 2025-08-06 PROCEDURE — 93005 ELECTROCARDIOGRAM TRACING: CPT

## 2025-08-06 PROCEDURE — 83735 ASSAY OF MAGNESIUM: CPT | Performed by: EMERGENCY MEDICINE

## 2025-08-06 PROCEDURE — 96375 TX/PRO/DX INJ NEW DRUG ADDON: CPT

## 2025-08-06 PROCEDURE — 83880 ASSAY OF NATRIURETIC PEPTIDE: CPT | Performed by: EMERGENCY MEDICINE

## 2025-08-06 PROCEDURE — 96365 THER/PROPH/DIAG IV INF INIT: CPT

## 2025-08-06 PROCEDURE — 25000003 PHARM REV CODE 250: Performed by: EMERGENCY MEDICINE

## 2025-08-06 PROCEDURE — 83690 ASSAY OF LIPASE: CPT | Performed by: EMERGENCY MEDICINE

## 2025-08-06 RX ORDER — ONDANSETRON HYDROCHLORIDE 2 MG/ML
4 INJECTION, SOLUTION INTRAVENOUS EVERY 6 HOURS PRN
Status: DISCONTINUED | OUTPATIENT
Start: 2025-08-06 | End: 2025-08-09 | Stop reason: HOSPADM

## 2025-08-06 RX ORDER — CIPROFLOXACIN 2 MG/ML
400 INJECTION, SOLUTION INTRAVENOUS
Status: DISCONTINUED | OUTPATIENT
Start: 2025-08-06 | End: 2025-08-06

## 2025-08-06 RX ORDER — DICYCLOMINE HYDROCHLORIDE 20 MG/1
20 TABLET ORAL 3 TIMES DAILY
COMMUNITY

## 2025-08-06 RX ORDER — QUETIAPINE FUMARATE 100 MG/1
100 TABLET, FILM COATED ORAL DAILY
Status: DISCONTINUED | OUTPATIENT
Start: 2025-08-07 | End: 2025-08-09 | Stop reason: HOSPADM

## 2025-08-06 RX ORDER — ONDANSETRON HYDROCHLORIDE 2 MG/ML
4 INJECTION, SOLUTION INTRAVENOUS
Status: COMPLETED | OUTPATIENT
Start: 2025-08-06 | End: 2025-08-06

## 2025-08-06 RX ORDER — BUPROPION HYDROCHLORIDE 150 MG/1
150 TABLET, EXTENDED RELEASE ORAL 2 TIMES DAILY
Status: DISCONTINUED | OUTPATIENT
Start: 2025-08-06 | End: 2025-08-09 | Stop reason: HOSPADM

## 2025-08-06 RX ORDER — PROPRANOLOL HYDROCHLORIDE 10 MG/1
10 TABLET ORAL 2 TIMES DAILY
Status: DISCONTINUED | OUTPATIENT
Start: 2025-08-06 | End: 2025-08-09 | Stop reason: HOSPADM

## 2025-08-06 RX ORDER — HYDROMORPHONE HYDROCHLORIDE 2 MG/ML
0.5 INJECTION, SOLUTION INTRAMUSCULAR; INTRAVENOUS; SUBCUTANEOUS EVERY 6 HOURS PRN
Refills: 0 | Status: DISCONTINUED | OUTPATIENT
Start: 2025-08-06 | End: 2025-08-07

## 2025-08-06 RX ORDER — LORAZEPAM 2 MG/ML
1 INJECTION INTRAMUSCULAR ONCE
Status: COMPLETED | OUTPATIENT
Start: 2025-08-06 | End: 2025-08-06

## 2025-08-06 RX ORDER — SODIUM CHLORIDE 0.9 % (FLUSH) 0.9 %
10 SYRINGE (ML) INJECTION
Status: DISCONTINUED | OUTPATIENT
Start: 2025-08-06 | End: 2025-08-09 | Stop reason: HOSPADM

## 2025-08-06 RX ORDER — METRONIDAZOLE 500 MG/100ML
500 INJECTION, SOLUTION INTRAVENOUS
Status: DISCONTINUED | OUTPATIENT
Start: 2025-08-06 | End: 2025-08-06

## 2025-08-06 RX ORDER — OXYCODONE HYDROCHLORIDE 5 MG/1
5 TABLET ORAL EVERY 6 HOURS PRN
Refills: 0 | Status: DISCONTINUED | OUTPATIENT
Start: 2025-08-06 | End: 2025-08-08

## 2025-08-06 RX ORDER — TALC
6 POWDER (GRAM) TOPICAL NIGHTLY PRN
Status: DISCONTINUED | OUTPATIENT
Start: 2025-08-06 | End: 2025-08-09 | Stop reason: HOSPADM

## 2025-08-06 RX ORDER — HALOPERIDOL LACTATE 5 MG/ML
5 INJECTION, SOLUTION INTRAMUSCULAR ONCE
Status: COMPLETED | OUTPATIENT
Start: 2025-08-06 | End: 2025-08-06

## 2025-08-06 RX ORDER — BUSPIRONE HYDROCHLORIDE 5 MG/1
10 TABLET ORAL DAILY
Status: DISCONTINUED | OUTPATIENT
Start: 2025-08-07 | End: 2025-08-09 | Stop reason: HOSPADM

## 2025-08-06 RX ORDER — MORPHINE SULFATE 4 MG/ML
4 INJECTION, SOLUTION INTRAMUSCULAR; INTRAVENOUS
Refills: 0 | Status: COMPLETED | OUTPATIENT
Start: 2025-08-06 | End: 2025-08-06

## 2025-08-06 RX ORDER — IOPAMIDOL 755 MG/ML
100 INJECTION, SOLUTION INTRAVASCULAR
Status: COMPLETED | OUTPATIENT
Start: 2025-08-06 | End: 2025-08-06

## 2025-08-06 RX ORDER — LOSARTAN POTASSIUM 50 MG/1
100 TABLET ORAL DAILY
Status: DISCONTINUED | OUTPATIENT
Start: 2025-08-07 | End: 2025-08-09 | Stop reason: HOSPADM

## 2025-08-06 RX ORDER — BUSPIRONE HYDROCHLORIDE 5 MG/1
15 TABLET ORAL NIGHTLY
Status: DISCONTINUED | OUTPATIENT
Start: 2025-08-06 | End: 2025-08-09 | Stop reason: HOSPADM

## 2025-08-06 RX ORDER — CIPROFLOXACIN 2 MG/ML
400 INJECTION, SOLUTION INTRAVENOUS
Status: DISCONTINUED | OUTPATIENT
Start: 2025-08-06 | End: 2025-08-09 | Stop reason: HOSPADM

## 2025-08-06 RX ORDER — DIPHENHYDRAMINE HYDROCHLORIDE 50 MG/ML
25 INJECTION, SOLUTION INTRAMUSCULAR; INTRAVENOUS ONCE
Status: COMPLETED | OUTPATIENT
Start: 2025-08-06 | End: 2025-08-06

## 2025-08-06 RX ORDER — METRONIDAZOLE 500 MG/100ML
500 INJECTION, SOLUTION INTRAVENOUS
Status: DISCONTINUED | OUTPATIENT
Start: 2025-08-06 | End: 2025-08-09 | Stop reason: HOSPADM

## 2025-08-06 RX ADMIN — METRONIDAZOLE 500 MG: 500 INJECTION, SOLUTION INTRAVENOUS at 10:08

## 2025-08-06 RX ADMIN — HALOPERIDOL LACTATE 5 MG: 5 INJECTION, SOLUTION INTRAMUSCULAR at 10:08

## 2025-08-06 RX ADMIN — MORPHINE SULFATE 4 MG: 4 INJECTION, SOLUTION INTRAMUSCULAR; INTRAVENOUS at 10:08

## 2025-08-06 RX ADMIN — BUSPIRONE HYDROCHLORIDE 15 MG: 5 TABLET ORAL at 09:08

## 2025-08-06 RX ADMIN — HYDROMORPHONE HYDROCHLORIDE 0.5 MG: 2 INJECTION INTRAMUSCULAR; INTRAVENOUS; SUBCUTANEOUS at 08:08

## 2025-08-06 RX ADMIN — METRONIDAZOLE 500 MG: 5 INJECTION, SOLUTION INTRAVENOUS at 10:08

## 2025-08-06 RX ADMIN — PROPRANOLOL HYDROCHLORIDE 10 MG: 10 TABLET ORAL at 09:08

## 2025-08-06 RX ADMIN — OXYCODONE 5 MG: 5 TABLET ORAL at 06:08

## 2025-08-06 RX ADMIN — SODIUM CHLORIDE 1000 ML: 9 INJECTION, SOLUTION INTRAVENOUS at 10:08

## 2025-08-06 RX ADMIN — ONDANSETRON 4 MG: 2 INJECTION INTRAMUSCULAR; INTRAVENOUS at 08:08

## 2025-08-06 RX ADMIN — MORPHINE SULFATE 4 MG: 4 INJECTION, SOLUTION INTRAMUSCULAR; INTRAVENOUS at 03:08

## 2025-08-06 RX ADMIN — LORAZEPAM 1 MG: 2 INJECTION INTRAMUSCULAR; INTRAVENOUS at 10:08

## 2025-08-06 RX ADMIN — SODIUM CHLORIDE 1572 ML: 9 INJECTION, SOLUTION INTRAVENOUS at 10:08

## 2025-08-06 RX ADMIN — CIPROFLOXACIN 400 MG: 2 INJECTION, SOLUTION INTRAVENOUS at 11:08

## 2025-08-06 RX ADMIN — DIPHENHYDRAMINE HYDROCHLORIDE 25 MG: 50 INJECTION INTRAMUSCULAR; INTRAVENOUS at 10:08

## 2025-08-06 RX ADMIN — BUPROPION HYDROCHLORIDE 150 MG: 150 TABLET, FILM COATED, EXTENDED RELEASE ORAL at 09:08

## 2025-08-06 RX ADMIN — ONDANSETRON 4 MG: 2 INJECTION INTRAMUSCULAR; INTRAVENOUS at 10:08

## 2025-08-06 RX ADMIN — IOPAMIDOL 100 ML: 755 INJECTION, SOLUTION INTRAVENOUS at 11:08

## 2025-08-06 RX ADMIN — ONDANSETRON 4 MG: 2 INJECTION INTRAMUSCULAR; INTRAVENOUS at 03:08

## 2025-08-06 RX ADMIN — CIPROFLOXACIN 400 MG: 2 INJECTION, SOLUTION INTRAVENOUS at 10:08

## 2025-08-07 ENCOUNTER — ANESTHESIA EVENT (OUTPATIENT)
Dept: SURGERY | Facility: HOSPITAL | Age: 50
End: 2025-08-07
Payer: MEDICARE

## 2025-08-07 ENCOUNTER — ANESTHESIA (OUTPATIENT)
Dept: SURGERY | Facility: HOSPITAL | Age: 50
End: 2025-08-07
Payer: MEDICARE

## 2025-08-07 PROBLEM — K35.80 ACUTE APPENDICITIS: Status: ACTIVE | Noted: 2025-08-07

## 2025-08-07 LAB
APICAL FOUR CHAMBER EJECTION FRACTION: 78 %
AV PEAK GRADIENT: 19 MMHG
AV VALVE AREA BY VELOCITY RATIO: 2.3 CM²
AV VELOCITY RATIO: 0.73
BSA FOR ECHO PROCEDURE: 2.83 M2
CV ECHO LV RWT: 0.83 CM
DOP CALC AO PEAK VEL: 2.2 M/S
DOP CALC LVOT AREA: 3.1 CM2
DOP CALC LVOT DIAMETER: 2 CM
DOP CALC LVOT PEAK VEL: 1.6 M/S
DOP CALC MV VTI: 25 CM
E WAVE DECELERATION TIME: 214 MSEC
E/A RATIO: 0.86
ECHO LV POSTERIOR WALL: 1.5 CM (ref 0.6–1.1)
FRACTIONAL SHORTENING: 36.1 % (ref 28–44)
INTERVENTRICULAR SEPTUM: 1.6 CM (ref 0.6–1.1)
LEFT ATRIUM SIZE: 0 CM
LEFT INTERNAL DIMENSION IN SYSTOLE: 2.3 CM (ref 2.1–4)
LEFT VENTRICLE DIASTOLIC VOLUME INDEX: 20.46 ML/M2
LEFT VENTRICLE DIASTOLIC VOLUME: 53 ML
LEFT VENTRICLE END DIASTOLIC VOLUME APICAL 2 CHAMBER: 0 ML
LEFT VENTRICLE END DIASTOLIC VOLUME APICAL 4 CHAMBER INDEX BSA: 23.6 ML/M2
LEFT VENTRICLE END DIASTOLIC VOLUME APICAL 4 CHAMBER: 61.13 ML
LEFT VENTRICLE MASS INDEX: 81.9 G/M2
LEFT VENTRICLE SYSTOLIC VOLUME INDEX: 6.9 ML/M2
LEFT VENTRICLE SYSTOLIC VOLUME: 18 ML
LEFT VENTRICULAR INTERNAL DIMENSION IN DIASTOLE: 3.6 CM (ref 3.5–6)
LEFT VENTRICULAR MASS: 212 G
LVED V (TEICH): 52.89 ML
LVES V (TEICH): 18.19 ML
MV MEAN GRADIENT: 2 MMHG
MV PEAK A VEL: 1.08 M/S
MV PEAK E VEL: 0.93 M/S
MV PEAK GRADIENT: 4 MMHG
MV STENOSIS PRESSURE HALF TIME: 62.06 MS
MV VALVE AREA P 1/2 METHOD: 3.54 CM2
POCT GLUCOSE: 107 MG/DL (ref 70–110)
POCT GLUCOSE: 130 MG/DL (ref 70–110)
POCT GLUCOSE: 145 MG/DL (ref 70–110)
PV PEAK GRADIENT: 4 MMHG
PV PEAK VELOCITY: 1 M/S
RIGHT VENTRICULAR END-DIASTOLIC DIMENSION: 0 CM
Z-SCORE OF LEFT VENTRICULAR DIMENSION IN END DIASTOLE: -16.05
Z-SCORE OF LEFT VENTRICULAR DIMENSION IN END SYSTOLE: -12.03

## 2025-08-07 PROCEDURE — 25000003 PHARM REV CODE 250: Performed by: NURSE ANESTHETIST, CERTIFIED REGISTERED

## 2025-08-07 PROCEDURE — 37000008 HC ANESTHESIA 1ST 15 MINUTES: Performed by: SURGERY

## 2025-08-07 PROCEDURE — 63600175 PHARM REV CODE 636 W HCPCS: Performed by: NURSE ANESTHETIST, CERTIFIED REGISTERED

## 2025-08-07 PROCEDURE — 27201423 OPTIME MED/SURG SUP & DEVICES STERILE SUPPLY: Performed by: SURGERY

## 2025-08-07 PROCEDURE — 63600175 PHARM REV CODE 636 W HCPCS: Performed by: SURGERY

## 2025-08-07 PROCEDURE — 36000708 HC OR TIME LEV III 1ST 15 MIN: Performed by: SURGERY

## 2025-08-07 PROCEDURE — 99900035 HC TECH TIME PER 15 MIN (STAT)

## 2025-08-07 PROCEDURE — 94799 UNLISTED PULMONARY SVC/PX: CPT

## 2025-08-07 PROCEDURE — 88304 TISSUE EXAM BY PATHOLOGIST: CPT | Performed by: SURGERY

## 2025-08-07 PROCEDURE — 99900031 HC PATIENT EDUCATION (STAT)

## 2025-08-07 PROCEDURE — G0378 HOSPITAL OBSERVATION PER HR: HCPCS

## 2025-08-07 PROCEDURE — 37000009 HC ANESTHESIA EA ADD 15 MINS: Performed by: SURGERY

## 2025-08-07 PROCEDURE — 25000003 PHARM REV CODE 250: Performed by: SURGERY

## 2025-08-07 PROCEDURE — 27000221 HC OXYGEN, UP TO 24 HOURS

## 2025-08-07 PROCEDURE — 0DTJ4ZZ RESECTION OF APPENDIX, PERCUTANEOUS ENDOSCOPIC APPROACH: ICD-10-PCS | Performed by: SURGERY

## 2025-08-07 PROCEDURE — 94761 N-INVAS EAR/PLS OXIMETRY MLT: CPT

## 2025-08-07 PROCEDURE — 36000709 HC OR TIME LEV III EA ADD 15 MIN: Performed by: SURGERY

## 2025-08-07 PROCEDURE — 71000033 HC RECOVERY, INTIAL HOUR: Performed by: SURGERY

## 2025-08-07 RX ORDER — LEVOFLOXACIN 5 MG/ML
750 INJECTION, SOLUTION INTRAVENOUS
Status: DISCONTINUED | OUTPATIENT
Start: 2025-08-07 | End: 2025-08-07

## 2025-08-07 RX ORDER — NOREPINEPHRINE BITARTRATE 1 MG/ML
INJECTION, SOLUTION INTRAVENOUS
Status: DISCONTINUED | OUTPATIENT
Start: 2025-08-07 | End: 2025-08-07

## 2025-08-07 RX ORDER — TALC
6 POWDER (GRAM) TOPICAL NIGHTLY PRN
Status: DISCONTINUED | OUTPATIENT
Start: 2025-08-07 | End: 2025-08-09 | Stop reason: HOSPADM

## 2025-08-07 RX ORDER — PHENYLEPHRINE HYDROCHLORIDE 10 MG/ML
INJECTION INTRAVENOUS
Status: DISCONTINUED | OUTPATIENT
Start: 2025-08-07 | End: 2025-08-07

## 2025-08-07 RX ORDER — SUCRALFATE 1 G/1
1 TABLET ORAL EVERY 6 HOURS
Status: DISCONTINUED | OUTPATIENT
Start: 2025-08-07 | End: 2025-08-09 | Stop reason: HOSPADM

## 2025-08-07 RX ORDER — ACETAMINOPHEN 325 MG/1
650 TABLET ORAL EVERY 6 HOURS PRN
Status: DISCONTINUED | OUTPATIENT
Start: 2025-08-07 | End: 2025-08-09 | Stop reason: HOSPADM

## 2025-08-07 RX ORDER — DEXMEDETOMIDINE HYDROCHLORIDE 100 UG/ML
INJECTION, SOLUTION INTRAVENOUS
Status: DISCONTINUED | OUTPATIENT
Start: 2025-08-07 | End: 2025-08-07

## 2025-08-07 RX ORDER — ONDANSETRON HYDROCHLORIDE 2 MG/ML
4 INJECTION, SOLUTION INTRAVENOUS EVERY 6 HOURS PRN
Status: DISCONTINUED | OUTPATIENT
Start: 2025-08-07 | End: 2025-08-09 | Stop reason: HOSPADM

## 2025-08-07 RX ORDER — ACETAMINOPHEN 325 MG/1
650 TABLET ORAL EVERY 4 HOURS PRN
Status: DISCONTINUED | OUTPATIENT
Start: 2025-08-07 | End: 2025-08-09 | Stop reason: HOSPADM

## 2025-08-07 RX ORDER — KETOROLAC TROMETHAMINE 30 MG/ML
INJECTION, SOLUTION INTRAMUSCULAR; INTRAVENOUS
Status: DISCONTINUED | OUTPATIENT
Start: 2025-08-07 | End: 2025-08-07

## 2025-08-07 RX ORDER — BISMUTH SUBSALICYLATE 525 MG/30ML
30 LIQUID ORAL EVERY 6 HOURS
Status: DISCONTINUED | OUTPATIENT
Start: 2025-08-07 | End: 2025-08-09 | Stop reason: HOSPADM

## 2025-08-07 RX ORDER — PROPOFOL 10 MG/ML
VIAL (ML) INTRAVENOUS
Status: DISCONTINUED | OUTPATIENT
Start: 2025-08-07 | End: 2025-08-07

## 2025-08-07 RX ORDER — HYDROMORPHONE HYDROCHLORIDE 2 MG/ML
0.5 INJECTION, SOLUTION INTRAMUSCULAR; INTRAVENOUS; SUBCUTANEOUS EVERY 5 MIN PRN
Status: DISCONTINUED | OUTPATIENT
Start: 2025-08-07 | End: 2025-08-07

## 2025-08-07 RX ORDER — CYANOCOBALAMIN 1000 UG/ML
1000 INJECTION, SOLUTION INTRAMUSCULAR; SUBCUTANEOUS DAILY
Status: DISCONTINUED | OUTPATIENT
Start: 2025-08-08 | End: 2025-08-09 | Stop reason: HOSPADM

## 2025-08-07 RX ORDER — SODIUM CHLORIDE, SODIUM LACTATE, POTASSIUM CHLORIDE, CALCIUM CHLORIDE 600; 310; 30; 20 MG/100ML; MG/100ML; MG/100ML; MG/100ML
INJECTION, SOLUTION INTRAVENOUS CONTINUOUS
Status: DISCONTINUED | OUTPATIENT
Start: 2025-08-07 | End: 2025-08-07

## 2025-08-07 RX ORDER — MIDAZOLAM HYDROCHLORIDE 1 MG/ML
INJECTION INTRAMUSCULAR; INTRAVENOUS
Status: DISCONTINUED | OUTPATIENT
Start: 2025-08-07 | End: 2025-08-07

## 2025-08-07 RX ORDER — LIDOCAINE HYDROCHLORIDE 20 MG/ML
INJECTION, SOLUTION EPIDURAL; INFILTRATION; INTRACAUDAL; PERINEURAL
Status: DISCONTINUED | OUTPATIENT
Start: 2025-08-07 | End: 2025-08-07

## 2025-08-07 RX ORDER — HYDROCODONE BITARTRATE AND ACETAMINOPHEN 5; 325 MG/1; MG/1
1 TABLET ORAL EVERY 4 HOURS PRN
Status: DISCONTINUED | OUTPATIENT
Start: 2025-08-07 | End: 2025-08-09 | Stop reason: HOSPADM

## 2025-08-07 RX ORDER — HYDROMORPHONE HYDROCHLORIDE 2 MG/ML
1 INJECTION, SOLUTION INTRAMUSCULAR; INTRAVENOUS; SUBCUTANEOUS EVERY 4 HOURS PRN
Status: DISCONTINUED | OUTPATIENT
Start: 2025-08-07 | End: 2025-08-09 | Stop reason: HOSPADM

## 2025-08-07 RX ORDER — ONDANSETRON HYDROCHLORIDE 2 MG/ML
INJECTION, SOLUTION INTRAVENOUS
Status: DISCONTINUED | OUTPATIENT
Start: 2025-08-07 | End: 2025-08-07

## 2025-08-07 RX ORDER — MUPIROCIN 20 MG/G
1 OINTMENT TOPICAL 2 TIMES DAILY
Status: DISCONTINUED | OUTPATIENT
Start: 2025-08-07 | End: 2025-08-09 | Stop reason: HOSPADM

## 2025-08-07 RX ORDER — SODIUM CHLORIDE 0.9 % (FLUSH) 0.9 %
10 SYRINGE (ML) INJECTION
Status: DISCONTINUED | OUTPATIENT
Start: 2025-08-07 | End: 2025-08-07

## 2025-08-07 RX ORDER — FAMOTIDINE 20 MG/1
20 TABLET, FILM COATED ORAL
Status: DISCONTINUED | OUTPATIENT
Start: 2025-08-07 | End: 2025-08-09 | Stop reason: HOSPADM

## 2025-08-07 RX ORDER — DEXAMETHASONE SODIUM PHOSPHATE 4 MG/ML
INJECTION, SOLUTION INTRA-ARTICULAR; INTRALESIONAL; INTRAMUSCULAR; INTRAVENOUS; SOFT TISSUE
Status: DISCONTINUED | OUTPATIENT
Start: 2025-08-07 | End: 2025-08-07

## 2025-08-07 RX ORDER — GLUCAGON 1 MG
1 KIT INJECTION
Status: DISCONTINUED | OUTPATIENT
Start: 2025-08-07 | End: 2025-08-07

## 2025-08-07 RX ORDER — SUCCINYLCHOLINE CHLORIDE 20 MG/ML
INJECTION INTRAMUSCULAR; INTRAVENOUS
Status: DISCONTINUED | OUTPATIENT
Start: 2025-08-07 | End: 2025-08-07

## 2025-08-07 RX ORDER — FENTANYL CITRATE 50 UG/ML
25 INJECTION, SOLUTION INTRAMUSCULAR; INTRAVENOUS EVERY 5 MIN PRN
Status: DISCONTINUED | OUTPATIENT
Start: 2025-08-07 | End: 2025-08-07

## 2025-08-07 RX ORDER — HYDROMORPHONE HYDROCHLORIDE 2 MG/ML
0.25 INJECTION, SOLUTION INTRAMUSCULAR; INTRAVENOUS; SUBCUTANEOUS EVERY 4 HOURS PRN
Status: DISCONTINUED | OUTPATIENT
Start: 2025-08-07 | End: 2025-08-09 | Stop reason: HOSPADM

## 2025-08-07 RX ORDER — SODIUM CHLORIDE 9 MG/ML
INJECTION, SOLUTION INTRAVENOUS CONTINUOUS
Status: DISCONTINUED | OUTPATIENT
Start: 2025-08-07 | End: 2025-08-09 | Stop reason: HOSPADM

## 2025-08-07 RX ORDER — FENTANYL CITRATE 50 UG/ML
INJECTION, SOLUTION INTRAMUSCULAR; INTRAVENOUS
Status: DISCONTINUED | OUTPATIENT
Start: 2025-08-07 | End: 2025-08-07

## 2025-08-07 RX ORDER — ROCURONIUM BROMIDE 10 MG/ML
INJECTION, SOLUTION INTRAVENOUS
Status: DISCONTINUED | OUTPATIENT
Start: 2025-08-07 | End: 2025-08-07

## 2025-08-07 RX ORDER — HYDROMORPHONE HYDROCHLORIDE 2 MG/ML
0.2 INJECTION, SOLUTION INTRAMUSCULAR; INTRAVENOUS; SUBCUTANEOUS EVERY 5 MIN PRN
Status: DISCONTINUED | OUTPATIENT
Start: 2025-08-07 | End: 2025-08-07

## 2025-08-07 RX ORDER — BUPIVACAINE HYDROCHLORIDE 2.5 MG/ML
INJECTION, SOLUTION EPIDURAL; INFILTRATION; INTRACAUDAL; PERINEURAL
Status: DISCONTINUED | OUTPATIENT
Start: 2025-08-07 | End: 2025-08-07 | Stop reason: HOSPADM

## 2025-08-07 RX ADMIN — DEXMEDETOMIDINE 20 MCG: 200 INJECTION, SOLUTION INTRAVENOUS at 12:08

## 2025-08-07 RX ADMIN — HYDROMORPHONE HYDROCHLORIDE 1 MG: 2 INJECTION INTRAMUSCULAR; INTRAVENOUS; SUBCUTANEOUS at 01:08

## 2025-08-07 RX ADMIN — MUPIROCIN 1 G: 20 OINTMENT TOPICAL at 08:08

## 2025-08-07 RX ADMIN — ONDANSETRON 4 MG: 2 INJECTION INTRAMUSCULAR; INTRAVENOUS at 08:08

## 2025-08-07 RX ADMIN — ROCURONIUM BROMIDE 20 MG: 10 INJECTION, SOLUTION INTRAVENOUS at 12:08

## 2025-08-07 RX ADMIN — FENTANYL CITRATE 100 MCG: 50 INJECTION, SOLUTION INTRAMUSCULAR; INTRAVENOUS at 11:08

## 2025-08-07 RX ADMIN — HYDROMORPHONE HYDROCHLORIDE 1 MG: 2 INJECTION INTRAMUSCULAR; INTRAVENOUS; SUBCUTANEOUS at 10:08

## 2025-08-07 RX ADMIN — SODIUM CHLORIDE, POTASSIUM CHLORIDE, SODIUM LACTATE AND CALCIUM CHLORIDE: 600; 310; 30; 20 INJECTION, SOLUTION INTRAVENOUS at 12:08

## 2025-08-07 RX ADMIN — MIDAZOLAM 2 MG: 1 INJECTION INTRAMUSCULAR; INTRAVENOUS at 11:08

## 2025-08-07 RX ADMIN — SUCCINYLCHOLINE CHLORIDE 140 MG: 20 INJECTION, SOLUTION INTRAMUSCULAR; INTRAVENOUS at 11:08

## 2025-08-07 RX ADMIN — PHENYLEPHRINE HYDROCHLORIDE 100 MCG: 10 INJECTION INTRAVENOUS at 12:08

## 2025-08-07 RX ADMIN — DEXAMETHASONE SODIUM PHOSPHATE 4 MG: 4 INJECTION, SOLUTION INTRA-ARTICULAR; INTRALESIONAL; INTRAMUSCULAR; INTRAVENOUS; SOFT TISSUE at 12:08

## 2025-08-07 RX ADMIN — HYDROMORPHONE HYDROCHLORIDE 1 MG: 2 INJECTION INTRAMUSCULAR; INTRAVENOUS; SUBCUTANEOUS at 08:08

## 2025-08-07 RX ADMIN — METRONIDAZOLE 500 MG: 5 INJECTION, SOLUTION INTRAVENOUS at 03:08

## 2025-08-07 RX ADMIN — ROCURONIUM BROMIDE 50 MG: 10 INJECTION, SOLUTION INTRAVENOUS at 12:08

## 2025-08-07 RX ADMIN — SODIUM CHLORIDE: 9 INJECTION, SOLUTION INTRAVENOUS at 08:08

## 2025-08-07 RX ADMIN — PROPRANOLOL HYDROCHLORIDE 10 MG: 10 TABLET ORAL at 08:08

## 2025-08-07 RX ADMIN — BUSPIRONE HYDROCHLORIDE 15 MG: 5 TABLET ORAL at 08:08

## 2025-08-07 RX ADMIN — KETOROLAC TROMETHAMINE 30 MG: 30 INJECTION, SOLUTION INTRAMUSCULAR at 12:08

## 2025-08-07 RX ADMIN — SUGAMMADEX 200 MG: 100 INJECTION, SOLUTION INTRAVENOUS at 12:08

## 2025-08-07 RX ADMIN — PROPOFOL 200 MG: 10 INJECTION, EMULSION INTRAVENOUS at 11:08

## 2025-08-07 RX ADMIN — CIPROFLOXACIN 400 MG: 2 INJECTION, SOLUTION INTRAVENOUS at 10:08

## 2025-08-07 RX ADMIN — LIDOCAINE HYDROCHLORIDE 140 MG: 20 INJECTION, SOLUTION EPIDURAL; INFILTRATION; INTRACAUDAL; PERINEURAL at 11:08

## 2025-08-07 RX ADMIN — HYDROMORPHONE HYDROCHLORIDE 1 MG: 2 INJECTION INTRAMUSCULAR; INTRAVENOUS; SUBCUTANEOUS at 05:08

## 2025-08-07 RX ADMIN — HYDROMORPHONE HYDROCHLORIDE 1 MG: 2 INJECTION INTRAMUSCULAR; INTRAVENOUS; SUBCUTANEOUS at 04:08

## 2025-08-07 RX ADMIN — ONDANSETRON 4 MG: 2 INJECTION INTRAMUSCULAR; INTRAVENOUS at 12:08

## 2025-08-07 RX ADMIN — CIPROFLOXACIN 400 MG: 2 INJECTION, SOLUTION INTRAVENOUS at 09:08

## 2025-08-07 RX ADMIN — NOREPINEPHRINE BITARTRATE 15 MCG: 1 INJECTION, SOLUTION, CONCENTRATE INTRAVENOUS at 12:08

## 2025-08-07 RX ADMIN — METRONIDAZOLE 500 MG: 5 INJECTION, SOLUTION INTRAVENOUS at 10:08

## 2025-08-07 RX ADMIN — ONDANSETRON 4 MG: 2 INJECTION INTRAMUSCULAR; INTRAVENOUS at 01:08

## 2025-08-07 RX ADMIN — SUGAMMADEX 100 MG: 100 INJECTION, SOLUTION INTRAVENOUS at 01:08

## 2025-08-07 RX ADMIN — PHENYLEPHRINE HYDROCHLORIDE 200 MCG: 10 INJECTION INTRAVENOUS at 12:08

## 2025-08-07 RX ADMIN — ACETAMINOPHEN 650 MG: 325 TABLET ORAL at 08:08

## 2025-08-07 RX ADMIN — BUPROPION HYDROCHLORIDE 150 MG: 150 TABLET, FILM COATED, EXTENDED RELEASE ORAL at 08:08

## 2025-08-07 RX ADMIN — ONDANSETRON 4 MG: 2 INJECTION INTRAMUSCULAR; INTRAVENOUS at 10:08

## 2025-08-07 RX ADMIN — SODIUM CHLORIDE: 9 INJECTION, SOLUTION INTRAVENOUS at 06:08

## 2025-08-07 RX ADMIN — ONDANSETRON 4 MG: 2 INJECTION INTRAMUSCULAR; INTRAVENOUS at 04:08

## 2025-08-07 RX ADMIN — METRONIDAZOLE 500 MG: 5 INJECTION, SOLUTION INTRAVENOUS at 05:08

## 2025-08-07 RX ADMIN — SODIUM CHLORIDE: 9 INJECTION, SOLUTION INTRAVENOUS at 11:08

## 2025-08-07 RX ADMIN — HYDROMORPHONE HYDROCHLORIDE 0.5 MG: 2 INJECTION INTRAMUSCULAR; INTRAVENOUS; SUBCUTANEOUS at 01:08

## 2025-08-07 NOTE — ANESTHESIA POSTPROCEDURE EVALUATION
Anesthesia Post Evaluation    Patient: Linda Lorenz    Procedure(s) Performed: Procedure(s) (LRB):  APPENDECTOMY, LAPAROSCOPIC (N/A)    Final Anesthesia Type: general      Patient location during evaluation: PACU  Patient participation: Yes- Able to Participate  Level of consciousness: awake and alert and oriented  Post-procedure vital signs: reviewed and stable  Pain management: adequate  Airway patency: patent    PONV status at discharge: No PONV  Anesthetic complications: no      Cardiovascular status: stable  Respiratory status: spontaneous ventilation and face mask  Hydration status: euvolemic  Follow-up not needed.              Vitals Value Taken Time   /76 08/07/25 13:27   Temp 38.2 °C (100.8 °F) 08/07/25 13:12   Pulse 88 08/07/25 13:30   Resp 11 08/07/25 13:30   SpO2 95 % 08/07/25 13:30   Vitals shown include unfiled device data.      No case tracking events are documented in the log.      Pain/Jesenia Score: Pain Rating Prior to Med Admin: 10 (8/7/2025 10:03 AM)  Pain Rating Post Med Admin: 7 (8/7/2025  6:21 AM)  Jesenia Score: 5 (8/7/2025  1:12 PM)

## 2025-08-07 NOTE — TRANSFER OF CARE
"Anesthesia Transfer of Care Note    Patient: Linda Lorenz    Procedure(s) Performed: Procedure(s) (LRB):  APPENDECTOMY, LAPAROSCOPIC (N/A)    Patient location: PACU    Anesthesia Type: general    Transport from OR: Transported from OR on room air with adequate spontaneous ventilation    Post pain: adequate analgesia    Post assessment: no apparent anesthetic complications    Post vital signs: stable    Level of consciousness: sedated    Nausea/Vomiting: no nausea/vomiting    Complications: none    Transfer of care protocol was followed      Last vitals: Visit Vitals  /80 (BP Location: Right arm, Patient Position: Lying)   Pulse 86   Temp (!) 38.2 °C (100.8 °F) (Temporal)   Resp 15   Ht 5' 3" (1.6 m)   Wt (!) 180.7 kg (398 lb 5.9 oz)   SpO2 100%   Breastfeeding No   BMI 70.57 kg/m²     "

## 2025-08-07 NOTE — ANESTHESIA PREPROCEDURE EVALUATION
08/07/2025  Linda Lorenz is a 49 y.o., female.      Pre-op Assessment    I have reviewed the Patient Summary Reports.     I have reviewed the Nursing Notes. I have reviewed the NPO Status.   I have reviewed the Medications.     Review of Systems  Anesthesia Hx:             Denies Family Hx of Anesthesia complications.    Denies Personal Hx of Anesthesia complications.                    Social:  Former Smoker, No Alcohol Use       Hematology/Oncology:  Hematology Normal   Oncology Normal                                   EENT/Dental:  EENT/Dental Normal           Cardiovascular:     Hypertension, well controlled              ECG has been reviewed.                            Renal/:  Chronic Renal Disease                Hepatic/GI:     GERD, well controlled                Neurological:   CVA, residual symptoms                                    Endocrine:  Endocrine Normal    BMI > 70      Morbid Obesity / BMI > 40  Dermatological:  Skin Normal    Psych:  Psychiatric History                  Physical Exam  General: Cooperative, Alert and Oriented    Airway:  Mallampati: II   Mouth Opening: Normal  TM Distance: Normal  Tongue: Normal  Neck ROM: Normal ROM    Dental:  Intact        Anesthesia Plan  Type of Anesthesia, risks & benefits discussed:    Anesthesia Type: Gen ETT  Intra-op Monitoring Plan: Standard ASA Monitors  Post Op Pain Control Plan: multimodal analgesia  Induction:  IV  Airway Plan: Direct  Informed Consent: Informed consent signed with the Patient and all parties understand the risks and agree with anesthesia plan.  All questions answered. Patient consented to blood products? Yes  ASA Score: 3    Ready For Surgery From Anesthesia Perspective.     .

## 2025-08-07 NOTE — ANESTHESIA PROCEDURE NOTES
Intubation    Date/Time: 8/7/2025 11:58 AM    Performed by: Satish Whitley CRNA  Authorized by: Garland Murphy DO    Intubation:     Induction:  Intravenous    Intubated:  Postinduction    Mask Ventilation:  Not attempted    Attempts:  1    Attempted By:  CRNA    Method of Intubation:  Direct    Blade:  [place] 2    Laryngeal View Grade: Grade I - full view of cords      Difficult Airway Encountered?: No      Complications:  None    Airway Device:  Oral endotracheal tube    Airway Device Size:  7.5    Style/Cuff Inflation:  Cuffed (inflated to minimal occlusive pressure)    Tube secured:  22    Secured at:  The lips    Placement Verified By:  Capnometry and Colorimetric ETCO2 device    Complicating Factors:  None    Findings Post-Intubation:  BS equal bilateral and atraumatic/condition of teeth unchanged

## 2025-08-08 LAB
ALBUMIN SERPL-MCNC: 3 G/DL (ref 3.5–5)
ALBUMIN/GLOB SERPL: 0.7 RATIO (ref 1.1–2)
ALP SERPL-CCNC: 57 UNIT/L (ref 40–150)
ALT SERPL-CCNC: 36 UNIT/L (ref 0–55)
ANION GAP SERPL CALC-SCNC: 9 MEQ/L
AST SERPL-CCNC: 17 UNIT/L (ref 11–45)
BILIRUB SERPL-MCNC: 0.9 MG/DL
BUN SERPL-MCNC: 16 MG/DL (ref 9.8–20.1)
CALCIUM SERPL-MCNC: 8.7 MG/DL (ref 8.4–10.2)
CHLORIDE SERPL-SCNC: 105 MMOL/L (ref 98–107)
CO2 SERPL-SCNC: 25 MMOL/L (ref 22–29)
CREAT SERPL-MCNC: 1.09 MG/DL (ref 0.55–1.02)
CREAT/UREA NIT SERPL: 15
ERYTHROCYTE [DISTWIDTH] IN BLOOD BY AUTOMATED COUNT: 13.7 % (ref 11.5–17)
GFR SERPLBLD CREATININE-BSD FMLA CKD-EPI: >60 ML/MIN/1.73/M2
GLOBULIN SER-MCNC: 4.4 GM/DL (ref 2.4–3.5)
GLUCOSE SERPL-MCNC: 140 MG/DL (ref 74–100)
HCT VFR BLD AUTO: 38.7 % (ref 37–47)
HGB BLD-MCNC: 11.9 G/DL (ref 12–16)
MAGNESIUM SERPL-MCNC: 1.7 MG/DL (ref 1.6–2.6)
MCH RBC QN AUTO: 29.8 PG (ref 27–31)
MCHC RBC AUTO-ENTMCNC: 30.7 G/DL (ref 33–36)
MCV RBC AUTO: 96.8 FL (ref 80–94)
NRBC BLD AUTO-RTO: 0 %
PLATELET # BLD AUTO: 214 X10(3)/MCL (ref 130–400)
PMV BLD AUTO: 10.5 FL (ref 7.4–10.4)
POTASSIUM SERPL-SCNC: 4 MMOL/L (ref 3.5–5.1)
PROT SERPL-MCNC: 7.4 GM/DL (ref 6.4–8.3)
RBC # BLD AUTO: 4 X10(6)/MCL (ref 4.2–5.4)
SODIUM SERPL-SCNC: 139 MMOL/L (ref 136–145)
WBC # BLD AUTO: 13.46 X10(3)/MCL (ref 4.5–11.5)

## 2025-08-08 PROCEDURE — 63600175 PHARM REV CODE 636 W HCPCS: Performed by: SURGERY

## 2025-08-08 PROCEDURE — 96372 THER/PROPH/DIAG INJ SC/IM: CPT | Performed by: SURGERY

## 2025-08-08 PROCEDURE — 11000001 HC ACUTE MED/SURG PRIVATE ROOM

## 2025-08-08 PROCEDURE — 83735 ASSAY OF MAGNESIUM: CPT | Performed by: SURGERY

## 2025-08-08 PROCEDURE — 25000003 PHARM REV CODE 250: Performed by: SURGERY

## 2025-08-08 PROCEDURE — 85027 COMPLETE CBC AUTOMATED: CPT | Performed by: SURGERY

## 2025-08-08 PROCEDURE — 94761 N-INVAS EAR/PLS OXIMETRY MLT: CPT

## 2025-08-08 PROCEDURE — 99900035 HC TECH TIME PER 15 MIN (STAT)

## 2025-08-08 PROCEDURE — 80053 COMPREHEN METABOLIC PANEL: CPT | Performed by: SURGERY

## 2025-08-08 PROCEDURE — 36415 COLL VENOUS BLD VENIPUNCTURE: CPT | Performed by: SURGERY

## 2025-08-08 RX ORDER — THIAMINE HYDROCHLORIDE 100 MG/ML
400 INJECTION, SOLUTION INTRAMUSCULAR; INTRAVENOUS DAILY
Status: DISCONTINUED | OUTPATIENT
Start: 2025-08-08 | End: 2025-08-09 | Stop reason: HOSPADM

## 2025-08-08 RX ADMIN — SUCRALFATE 1 G: 1 TABLET ORAL at 11:08

## 2025-08-08 RX ADMIN — HYDROMORPHONE HYDROCHLORIDE 1 MG: 2 INJECTION INTRAMUSCULAR; INTRAVENOUS; SUBCUTANEOUS at 12:08

## 2025-08-08 RX ADMIN — METRONIDAZOLE 500 MG: 5 INJECTION, SOLUTION INTRAVENOUS at 05:08

## 2025-08-08 RX ADMIN — BUSPIRONE HYDROCHLORIDE 10 MG: 5 TABLET ORAL at 09:08

## 2025-08-08 RX ADMIN — FAMOTIDINE 20 MG: 20 TABLET, FILM COATED ORAL at 09:08

## 2025-08-08 RX ADMIN — CIPROFLOXACIN 400 MG: 2 INJECTION, SOLUTION INTRAVENOUS at 09:08

## 2025-08-08 RX ADMIN — ONDANSETRON 4 MG: 2 INJECTION INTRAMUSCULAR; INTRAVENOUS at 04:08

## 2025-08-08 RX ADMIN — CIPROFLOXACIN 400 MG: 2 INJECTION, SOLUTION INTRAVENOUS at 08:08

## 2025-08-08 RX ADMIN — Medication 30 ML: at 01:08

## 2025-08-08 RX ADMIN — SUCRALFATE 1 G: 1 TABLET ORAL at 01:08

## 2025-08-08 RX ADMIN — Medication 30 ML: at 06:08

## 2025-08-08 RX ADMIN — HYDROCODONE BITARTRATE AND ACETAMINOPHEN 1 TABLET: 5; 325 TABLET ORAL at 06:08

## 2025-08-08 RX ADMIN — HYDROCODONE BITARTRATE AND ACETAMINOPHEN 1 TABLET: 5; 325 TABLET ORAL at 08:08

## 2025-08-08 RX ADMIN — QUETIAPINE FUMARATE 100 MG: 100 TABLET ORAL at 09:08

## 2025-08-08 RX ADMIN — ONDANSETRON 4 MG: 2 INJECTION INTRAMUSCULAR; INTRAVENOUS at 09:08

## 2025-08-08 RX ADMIN — HYDROMORPHONE HYDROCHLORIDE 1 MG: 2 INJECTION INTRAMUSCULAR; INTRAVENOUS; SUBCUTANEOUS at 11:08

## 2025-08-08 RX ADMIN — MUPIROCIN 1 G: 20 OINTMENT TOPICAL at 08:08

## 2025-08-08 RX ADMIN — PROPRANOLOL HYDROCHLORIDE 10 MG: 10 TABLET ORAL at 09:08

## 2025-08-08 RX ADMIN — FAMOTIDINE 20 MG: 20 TABLET, FILM COATED ORAL at 08:08

## 2025-08-08 RX ADMIN — METRONIDAZOLE 500 MG: 5 INJECTION, SOLUTION INTRAVENOUS at 10:08

## 2025-08-08 RX ADMIN — BUSPIRONE HYDROCHLORIDE 15 MG: 5 TABLET ORAL at 08:08

## 2025-08-08 RX ADMIN — BUPROPION HYDROCHLORIDE 150 MG: 150 TABLET, FILM COATED, EXTENDED RELEASE ORAL at 09:08

## 2025-08-08 RX ADMIN — HYDROMORPHONE HYDROCHLORIDE 1 MG: 2 INJECTION INTRAMUSCULAR; INTRAVENOUS; SUBCUTANEOUS at 09:08

## 2025-08-08 RX ADMIN — METRONIDAZOLE 500 MG: 5 INJECTION, SOLUTION INTRAVENOUS at 01:08

## 2025-08-08 RX ADMIN — BUPROPION HYDROCHLORIDE 150 MG: 150 TABLET, FILM COATED, EXTENDED RELEASE ORAL at 08:08

## 2025-08-08 RX ADMIN — MUPIROCIN 1 G: 20 OINTMENT TOPICAL at 09:08

## 2025-08-08 RX ADMIN — CYANOCOBALAMIN 1000 MCG: 1000 INJECTION INTRAMUSCULAR; SUBCUTANEOUS at 09:08

## 2025-08-08 RX ADMIN — Medication 30 ML: at 11:08

## 2025-08-08 RX ADMIN — HYDROMORPHONE HYDROCHLORIDE 1 MG: 2 INJECTION INTRAMUSCULAR; INTRAVENOUS; SUBCUTANEOUS at 04:08

## 2025-08-08 RX ADMIN — THIAMINE HYDROCHLORIDE 400 MG: 100 INJECTION, SOLUTION INTRAMUSCULAR; INTRAVENOUS at 09:08

## 2025-08-08 RX ADMIN — LOSARTAN POTASSIUM 100 MG: 50 TABLET, FILM COATED ORAL at 09:08

## 2025-08-08 RX ADMIN — SUCRALFATE 1 G: 1 TABLET ORAL at 06:08

## 2025-08-08 RX ADMIN — FOLIC ACID 5 MG: 5 INJECTION, SOLUTION INTRAMUSCULAR; INTRAVENOUS; SUBCUTANEOUS at 07:08

## 2025-08-08 RX ADMIN — ONDANSETRON 4 MG: 2 INJECTION INTRAMUSCULAR; INTRAVENOUS at 03:08

## 2025-08-08 RX ADMIN — SODIUM CHLORIDE 125 MG: 9 INJECTION, SOLUTION INTRAVENOUS at 04:08

## 2025-08-08 RX ADMIN — ONDANSETRON 4 MG: 2 INJECTION INTRAMUSCULAR; INTRAVENOUS at 11:08

## 2025-08-08 RX ADMIN — PROPRANOLOL HYDROCHLORIDE 10 MG: 10 TABLET ORAL at 08:08

## 2025-08-09 VITALS
HEIGHT: 63 IN | BODY MASS INDEX: 51.91 KG/M2 | OXYGEN SATURATION: 92 % | RESPIRATION RATE: 18 BRPM | SYSTOLIC BLOOD PRESSURE: 104 MMHG | DIASTOLIC BLOOD PRESSURE: 71 MMHG | WEIGHT: 293 LBS | HEART RATE: 94 BPM | TEMPERATURE: 99 F

## 2025-08-09 LAB
ALBUMIN SERPL-MCNC: 2.8 G/DL (ref 3.5–5)
ALBUMIN/GLOB SERPL: 0.6 RATIO (ref 1.1–2)
ALP SERPL-CCNC: 80 UNIT/L (ref 40–150)
ALT SERPL-CCNC: 32 UNIT/L (ref 0–55)
ANION GAP SERPL CALC-SCNC: 8 MEQ/L
AST SERPL-CCNC: 17 UNIT/L (ref 11–45)
BILIRUB SERPL-MCNC: 0.6 MG/DL
BUN SERPL-MCNC: 14 MG/DL (ref 9.8–20.1)
CALCIUM SERPL-MCNC: 8.4 MG/DL (ref 8.4–10.2)
CHLORIDE SERPL-SCNC: 104 MMOL/L (ref 98–107)
CO2 SERPL-SCNC: 26 MMOL/L (ref 22–29)
CREAT SERPL-MCNC: 0.91 MG/DL (ref 0.55–1.02)
CREAT/UREA NIT SERPL: 15
ERYTHROCYTE [DISTWIDTH] IN BLOOD BY AUTOMATED COUNT: 13.7 % (ref 11.5–17)
GFR SERPLBLD CREATININE-BSD FMLA CKD-EPI: >60 ML/MIN/1.73/M2
GLOBULIN SER-MCNC: 4.5 GM/DL (ref 2.4–3.5)
GLUCOSE SERPL-MCNC: 93 MG/DL (ref 74–100)
HCT VFR BLD AUTO: 36.8 % (ref 37–47)
HGB BLD-MCNC: 11.3 G/DL (ref 12–16)
MAGNESIUM SERPL-MCNC: 2 MG/DL (ref 1.6–2.6)
MCH RBC QN AUTO: 30.1 PG (ref 27–31)
MCHC RBC AUTO-ENTMCNC: 30.7 G/DL (ref 33–36)
MCV RBC AUTO: 97.9 FL (ref 80–94)
NRBC BLD AUTO-RTO: 0 %
PLATELET # BLD AUTO: 238 X10(3)/MCL (ref 130–400)
PMV BLD AUTO: 10.7 FL (ref 7.4–10.4)
POTASSIUM SERPL-SCNC: 3.5 MMOL/L (ref 3.5–5.1)
PROT SERPL-MCNC: 7.3 GM/DL (ref 6.4–8.3)
RBC # BLD AUTO: 3.76 X10(6)/MCL (ref 4.2–5.4)
SODIUM SERPL-SCNC: 138 MMOL/L (ref 136–145)
WBC # BLD AUTO: 10.78 X10(3)/MCL (ref 4.5–11.5)

## 2025-08-09 PROCEDURE — 83735 ASSAY OF MAGNESIUM: CPT | Performed by: SURGERY

## 2025-08-09 PROCEDURE — 99900035 HC TECH TIME PER 15 MIN (STAT)

## 2025-08-09 PROCEDURE — 63600175 PHARM REV CODE 636 W HCPCS: Performed by: SURGERY

## 2025-08-09 PROCEDURE — 80053 COMPREHEN METABOLIC PANEL: CPT | Performed by: SURGERY

## 2025-08-09 PROCEDURE — 36415 COLL VENOUS BLD VENIPUNCTURE: CPT | Performed by: SURGERY

## 2025-08-09 PROCEDURE — 94761 N-INVAS EAR/PLS OXIMETRY MLT: CPT

## 2025-08-09 PROCEDURE — 25000003 PHARM REV CODE 250: Performed by: SURGERY

## 2025-08-09 PROCEDURE — 85027 COMPLETE CBC AUTOMATED: CPT | Performed by: SURGERY

## 2025-08-09 RX ADMIN — ONDANSETRON 4 MG: 2 INJECTION INTRAMUSCULAR; INTRAVENOUS at 05:08

## 2025-08-09 RX ADMIN — SUCRALFATE 1 G: 1 TABLET ORAL at 05:08

## 2025-08-09 RX ADMIN — THIAMINE HYDROCHLORIDE 400 MG: 100 INJECTION, SOLUTION INTRAMUSCULAR; INTRAVENOUS at 08:08

## 2025-08-09 RX ADMIN — HYDROMORPHONE HYDROCHLORIDE 1 MG: 2 INJECTION INTRAMUSCULAR; INTRAVENOUS; SUBCUTANEOUS at 09:08

## 2025-08-09 RX ADMIN — METRONIDAZOLE 500 MG: 5 INJECTION, SOLUTION INTRAVENOUS at 05:08

## 2025-08-09 RX ADMIN — Medication 30 ML: at 05:08

## 2025-08-09 RX ADMIN — HYDROMORPHONE HYDROCHLORIDE 1 MG: 2 INJECTION INTRAMUSCULAR; INTRAVENOUS; SUBCUTANEOUS at 05:08

## 2025-08-11 ENCOUNTER — PATIENT OUTREACH (OUTPATIENT)
Dept: ADMINISTRATIVE | Facility: CLINIC | Age: 50
End: 2025-08-11
Payer: MEDICARE

## 2025-08-11 ENCOUNTER — PATIENT MESSAGE (OUTPATIENT)
Dept: ADMINISTRATIVE | Facility: CLINIC | Age: 50
End: 2025-08-11
Payer: MEDICARE

## 2025-08-11 LAB
BACTERIA BLD CULT: NORMAL
BACTERIA BLD CULT: NORMAL
PSYCHE PATHOLOGY RESULT: NORMAL

## (undated) DEVICE — GLOVE SENSICARE PI SURG 6.5

## (undated) DEVICE — ADHESIVE DERMABOND ADVANCED

## (undated) DEVICE — APPLIER CLIP ENDO LIGAMAX 5MM

## (undated) DEVICE — POSITIONER HEEL FOAM CONVOLTD

## (undated) DEVICE — GLOVE SIGNATURE ESSNTL LTX 6.5

## (undated) DEVICE — NDL PNEUMO INSUFFLATI 120MM

## (undated) DEVICE — TROCAR KII SHLD BLDED 5X100MM

## (undated) DEVICE — DRAPE INCISE IOBAN 2 23X17IN

## (undated) DEVICE — SUPPORT ULNA NERVE PROTECTOR

## (undated) DEVICE — Device

## (undated) DEVICE — HOLDER SCALPEL SURGICAL GOLD

## (undated) DEVICE — SUT PLN GUT 2-0 CT-3 1X27

## (undated) DEVICE — SLEEVE KII ADV FIX 5X100MM

## (undated) DEVICE — SUT VCRL ENDOLOOP 0 18 VIOL

## (undated) DEVICE — NDL SAFETY 22G X 1.5 ECLIPSE

## (undated) DEVICE — CANNULA VISIPORT 5MM SMTH 11MM

## (undated) DEVICE — DISSECTOR EPIX LAPA 5MMX35CM

## (undated) DEVICE — BAG SPEC RETRV ENDO 4X6IN DISP

## (undated) DEVICE — IRRIGATOR SUCTION W/TIP

## (undated) DEVICE — SCISSOR 5MMX35CM DIRECT DRIVE

## (undated) DEVICE — GLOVE SENSICARE NEOPRENE 6.5

## (undated) DEVICE — SYR 10CC LUER LOCK

## (undated) DEVICE — SET TUB INSUFFLATION SUC 20L

## (undated) DEVICE — GLOVE SENSICARE PI GRN 7

## (undated) DEVICE — TROCAR KII FIOS 5MM X 150MM